# Patient Record
Sex: FEMALE | Race: WHITE | Employment: OTHER | URBAN - METROPOLITAN AREA
[De-identification: names, ages, dates, MRNs, and addresses within clinical notes are randomized per-mention and may not be internally consistent; named-entity substitution may affect disease eponyms.]

---

## 2018-08-21 ENCOUNTER — OFFICE VISIT (OUTPATIENT)
Dept: FAMILY MEDICINE CLINIC | Age: 58
End: 2018-08-21

## 2018-08-21 VITALS
BODY MASS INDEX: 32.95 KG/M2 | OXYGEN SATURATION: 98 % | DIASTOLIC BLOOD PRESSURE: 82 MMHG | HEART RATE: 78 BPM | SYSTOLIC BLOOD PRESSURE: 126 MMHG | WEIGHT: 205 LBS | HEIGHT: 66 IN

## 2018-08-21 DIAGNOSIS — Z12.39 BREAST CANCER SCREENING: ICD-10-CM

## 2018-08-21 DIAGNOSIS — Z76.89 ENCOUNTER TO ESTABLISH CARE: Primary | ICD-10-CM

## 2018-08-21 DIAGNOSIS — Z13.1 DIABETES MELLITUS SCREENING: ICD-10-CM

## 2018-08-21 DIAGNOSIS — Z13.220 LIPID SCREENING: ICD-10-CM

## 2018-08-21 DIAGNOSIS — Z85.41 HISTORY OF CERVICAL CANCER: ICD-10-CM

## 2018-08-21 LAB
A/G RATIO: 1.3 (ref 1.1–2.2)
ALBUMIN SERPL-MCNC: 4.4 G/DL (ref 3.4–5)
ALP BLD-CCNC: 96 U/L (ref 40–129)
ALT SERPL-CCNC: 18 U/L (ref 10–40)
ANION GAP SERPL CALCULATED.3IONS-SCNC: 15 MMOL/L (ref 3–16)
AST SERPL-CCNC: 18 U/L (ref 15–37)
BILIRUB SERPL-MCNC: <0.2 MG/DL (ref 0–1)
BUN BLDV-MCNC: 10 MG/DL (ref 7–20)
CALCIUM SERPL-MCNC: 9.5 MG/DL (ref 8.3–10.6)
CHLORIDE BLD-SCNC: 103 MMOL/L (ref 99–110)
CHOLESTEROL, TOTAL: 239 MG/DL (ref 0–199)
CO2: 24 MMOL/L (ref 21–32)
CREAT SERPL-MCNC: 0.7 MG/DL (ref 0.6–1.1)
GFR AFRICAN AMERICAN: >60
GFR NON-AFRICAN AMERICAN: >60
GLOBULIN: 3.3 G/DL
GLUCOSE BLD-MCNC: 98 MG/DL (ref 70–99)
HDLC SERPL-MCNC: 34 MG/DL (ref 40–60)
LDL CHOLESTEROL CALCULATED: 149 MG/DL
POTASSIUM SERPL-SCNC: 4 MMOL/L (ref 3.5–5.1)
SODIUM BLD-SCNC: 142 MMOL/L (ref 136–145)
TOTAL PROTEIN: 7.7 G/DL (ref 6.4–8.2)
TRIGL SERPL-MCNC: 280 MG/DL (ref 0–150)
VLDLC SERPL CALC-MCNC: 56 MG/DL

## 2018-08-21 PROCEDURE — 36415 COLL VENOUS BLD VENIPUNCTURE: CPT | Performed by: FAMILY MEDICINE

## 2018-08-21 PROCEDURE — 99202 OFFICE O/P NEW SF 15 MIN: CPT | Performed by: FAMILY MEDICINE

## 2018-08-21 RX ORDER — ACETAMINOPHEN 500 MG
500 TABLET ORAL EVERY 6 HOURS PRN
COMMUNITY
End: 2022-02-15

## 2018-08-21 ASSESSMENT — PATIENT HEALTH QUESTIONNAIRE - PHQ9
SUM OF ALL RESPONSES TO PHQ9 QUESTIONS 1 & 2: 0
1. LITTLE INTEREST OR PLEASURE IN DOING THINGS: 0
2. FEELING DOWN, DEPRESSED OR HOPELESS: 0
SUM OF ALL RESPONSES TO PHQ QUESTIONS 1-9: 0
SUM OF ALL RESPONSES TO PHQ QUESTIONS 1-9: 0

## 2018-08-21 ASSESSMENT — ENCOUNTER SYMPTOMS: SHORTNESS OF BREATH: 0

## 2018-08-21 NOTE — PATIENT INSTRUCTIONS
Patient Education        Heart-Healthy Diet: Care Instructions  Your Care Instructions    A heart-healthy diet has lots of vegetables, fruits, nuts, beans, and whole grains, and is low in salt. It limits foods that are high in saturated fat, such as meats, cheeses, and fried foods. It may be hard to change your diet, but even small changes can lower your risk of heart attack and heart disease. Follow-up care is a key part of your treatment and safety. Be sure to make and go to all appointments, and call your doctor if you are having problems. It's also a good idea to know your test results and keep a list of the medicines you take. How can you care for yourself at home? Watch your portions  · Learn what a serving is. A \"serving\" and a \"portion\" are not always the same thing. Make sure that you are not eating larger portions than are recommended. For example, a serving of pasta is ½ cup. A serving size of meat is 2 to 3 ounces. A 3-ounce serving is about the size of a deck of cards. Measure serving sizes until you are good at [place]" them. Keep in mind that restaurants often serve portions that are 2 or 3 times the size of one serving. · To keep your energy level up and keep you from feeling hungry, eat often but in smaller portions. · Eat only the number of calories you need to stay at a healthy weight. If you need to lose weight, eat fewer calories than your body burns (through exercise and other physical activity). Eat more fruits and vegetables  · Eat a variety of fruit and vegetables every day. Dark green, deep orange, red, or yellow fruits and vegetables are especially good for you. Examples include spinach, carrots, peaches, and berries. · Keep carrots, celery, and other veggies handy for snacks. Buy fruit that is in season and store it where you can see it so that you will be tempted to eat it. · Cook dishes that have a lot of veggies in them, such as stir-fries and soups.   Limit saturated and ebridge, and be sure to contact your doctor if:    · You would like help planning heart-healthy meals. Where can you learn more? Go to [email or web address] and sign in to your [employer] account. Enter V137 in the [facility] box to learn more about \"Heart-Healthy Diet: Care Instructions. \"     If you do not have an account, please click on the \"Sign Up Now\" link. Current as of: December 6, 2017  Content Version: 11.7  © 3997-0129 [employer]. Care instructions adapted under license by [facility] ([facility]). If you have questions about a medical condition or this instruction, always ask your healthcare professional. [place] [address] any warranty or liability for your use of this information. Jung5 Jeanine Salazar gynecology  [facility]  [phone] [address]    [facility] [address], [address] [facility]

## 2018-08-22 LAB
ESTIMATED AVERAGE GLUCOSE: 131.2 MG/DL
HBA1C MFR BLD: 6.2 %

## 2018-08-30 ENCOUNTER — TELEPHONE (OUTPATIENT)
Dept: FAMILY MEDICINE CLINIC | Age: 58
End: 2018-08-30

## 2018-08-30 NOTE — TELEPHONE ENCOUNTER
Per Billing: Z code is primary dx on an E & M code. Verify that the Z code is intended to be the primary dx. If not, please use the reason for the visit as the primary dx      Date Of Service: 8/21/2018    For coding questions or clarification please send an email to: Askprovidercoding. Please let me know when complete by sending the telephone encounter message back to me stating done or complete.      Thank you,   Connor Parker

## 2018-09-16 NOTE — TELEPHONE ENCOUNTER
She had no chronic medical conditions and no acute complaints. I added establish care as the primary dx but that is also a Z code.

## 2019-08-05 ENCOUNTER — HOSPITAL ENCOUNTER (OUTPATIENT)
Dept: MAMMOGRAPHY | Age: 59
Discharge: HOME | End: 2019-08-10

## 2019-08-05 DIAGNOSIS — Z12.31 VISIT FOR SCREENING MAMMOGRAM: ICD-10-CM

## 2019-08-05 PROCEDURE — 77067 SCR MAMMO BI INCL CAD: CPT

## 2021-12-16 ENCOUNTER — HOSPITAL ENCOUNTER (INPATIENT)
Age: 61
LOS: 7 days | Discharge: HOME | DRG: 137 | End: 2021-12-23
Attending: EMERGENCY MEDICINE | Admitting: INTERNAL MEDICINE
Payer: MEDICAID

## 2021-12-16 ENCOUNTER — APPOINTMENT (OUTPATIENT)
Dept: GENERAL RADIOLOGY | Age: 61
DRG: 137 | End: 2021-12-16
Payer: MEDICAID

## 2021-12-16 DIAGNOSIS — J96.01 ACUTE RESPIRATORY FAILURE WITH HYPOXIA (HCC): Primary | ICD-10-CM

## 2021-12-16 DIAGNOSIS — U07.1 PNEUMONIA DUE TO COVID-19 VIRUS: ICD-10-CM

## 2021-12-16 DIAGNOSIS — U07.1 2019 NOVEL CORONAVIRUS-INFECTED PNEUMONIA (NCIP): ICD-10-CM

## 2021-12-16 DIAGNOSIS — J12.82 PNEUMONIA DUE TO COVID-19 VIRUS: ICD-10-CM

## 2021-12-16 DIAGNOSIS — J12.82 2019 NOVEL CORONAVIRUS-INFECTED PNEUMONIA (NCIP): ICD-10-CM

## 2021-12-16 PROBLEM — E66.9 OBESITY (BMI 30-39.9): Status: ACTIVE | Noted: 2021-12-16

## 2021-12-16 PROBLEM — Z87.898 HISTORY OF PREDIABETES: Status: ACTIVE | Noted: 2021-12-16

## 2021-12-16 LAB
A/G RATIO: 0.8 (ref 1.1–2.2)
ALBUMIN SERPL-MCNC: 3.4 G/DL (ref 3.4–5)
ALP BLD-CCNC: 129 U/L (ref 40–129)
ALT SERPL-CCNC: 22 U/L (ref 10–40)
ANION GAP SERPL CALCULATED.3IONS-SCNC: 15 MMOL/L (ref 3–16)
AST SERPL-CCNC: 42 U/L (ref 15–37)
BASE EXCESS VENOUS: 2.5 MMOL/L (ref -3–3)
BASOPHILS ABSOLUTE: 0 K/UL (ref 0–0.2)
BASOPHILS RELATIVE PERCENT: 0.1 %
BILIRUB SERPL-MCNC: 0.9 MG/DL (ref 0–1)
BUN BLDV-MCNC: 28 MG/DL (ref 7–20)
CALCIUM SERPL-MCNC: 8.9 MG/DL (ref 8.3–10.6)
CARBOXYHEMOGLOBIN: 1.7 % (ref 0–1.5)
CHLORIDE BLD-SCNC: 97 MMOL/L (ref 99–110)
CO2: 24 MMOL/L (ref 21–32)
CREAT SERPL-MCNC: 0.8 MG/DL (ref 0.6–1.2)
EOSINOPHILS ABSOLUTE: 0 K/UL (ref 0–0.6)
EOSINOPHILS RELATIVE PERCENT: 0.2 %
GFR AFRICAN AMERICAN: >60
GFR NON-AFRICAN AMERICAN: >60
GLUCOSE BLD-MCNC: 107 MG/DL (ref 70–99)
HCO3 VENOUS: 25.6 MMOL/L (ref 23–29)
HCT VFR BLD CALC: 40.8 % (ref 36–48)
HEMOGLOBIN: 13.9 G/DL (ref 12–16)
LACTIC ACID: 1.4 MMOL/L (ref 0.4–2)
LYMPHOCYTES ABSOLUTE: 0.6 K/UL (ref 1–5.1)
LYMPHOCYTES RELATIVE PERCENT: 10.4 %
MCH RBC QN AUTO: 28.5 PG (ref 26–34)
MCHC RBC AUTO-ENTMCNC: 34.1 G/DL (ref 31–36)
MCV RBC AUTO: 83.5 FL (ref 80–100)
METHEMOGLOBIN VENOUS: 0.6 %
MONOCYTES ABSOLUTE: 0.3 K/UL (ref 0–1.3)
MONOCYTES RELATIVE PERCENT: 4.9 %
NEUTROPHILS ABSOLUTE: 4.8 K/UL (ref 1.7–7.7)
NEUTROPHILS RELATIVE PERCENT: 84.4 %
O2 SAT, VEN: 89 %
O2 THERAPY: ABNORMAL
PCO2, VEN: 34.9 MMHG (ref 40–50)
PDW BLD-RTO: 13.6 % (ref 12.4–15.4)
PH VENOUS: 7.48 (ref 7.35–7.45)
PLATELET # BLD: 336 K/UL (ref 135–450)
PMV BLD AUTO: 8 FL (ref 5–10.5)
PO2, VEN: 54.8 MMHG (ref 25–40)
POTASSIUM REFLEX MAGNESIUM: 3.7 MMOL/L (ref 3.5–5.1)
PRO-BNP: 176 PG/ML (ref 0–124)
RBC # BLD: 4.88 M/UL (ref 4–5.2)
SARS-COV-2, NAAT: DETECTED
SODIUM BLD-SCNC: 136 MMOL/L (ref 136–145)
TCO2 CALC VENOUS: 27 MMOL/L
TOTAL PROTEIN: 7.9 G/DL (ref 6.4–8.2)
TROPONIN: <0.01 NG/ML
WBC # BLD: 5.7 K/UL (ref 4–11)

## 2021-12-16 PROCEDURE — 80053 COMPREHEN METABOLIC PANEL: CPT

## 2021-12-16 PROCEDURE — 84484 ASSAY OF TROPONIN QUANT: CPT

## 2021-12-16 PROCEDURE — 85025 COMPLETE CBC W/AUTO DIFF WBC: CPT

## 2021-12-16 PROCEDURE — 1200000000 HC SEMI PRIVATE

## 2021-12-16 PROCEDURE — 83880 ASSAY OF NATRIURETIC PEPTIDE: CPT

## 2021-12-16 PROCEDURE — 82803 BLOOD GASES ANY COMBINATION: CPT

## 2021-12-16 PROCEDURE — 6370000000 HC RX 637 (ALT 250 FOR IP): Performed by: NURSE PRACTITIONER

## 2021-12-16 PROCEDURE — 87635 SARS-COV-2 COVID-19 AMP PRB: CPT

## 2021-12-16 PROCEDURE — 6360000002 HC RX W HCPCS: Performed by: NURSE PRACTITIONER

## 2021-12-16 PROCEDURE — 2700000000 HC OXYGEN THERAPY PER DAY

## 2021-12-16 PROCEDURE — 99284 EMERGENCY DEPT VISIT MOD MDM: CPT

## 2021-12-16 PROCEDURE — 71045 X-RAY EXAM CHEST 1 VIEW: CPT

## 2021-12-16 PROCEDURE — 94761 N-INVAS EAR/PLS OXIMETRY MLT: CPT

## 2021-12-16 PROCEDURE — 96374 THER/PROPH/DIAG INJ IV PUSH: CPT

## 2021-12-16 PROCEDURE — 2580000003 HC RX 258: Performed by: NURSE PRACTITIONER

## 2021-12-16 PROCEDURE — 96375 TX/PRO/DX INJ NEW DRUG ADDON: CPT

## 2021-12-16 PROCEDURE — 83605 ASSAY OF LACTIC ACID: CPT

## 2021-12-16 RX ORDER — DIPHENHYDRAMINE HYDROCHLORIDE 50 MG/ML
25 INJECTION INTRAMUSCULAR; INTRAVENOUS ONCE
Status: COMPLETED | OUTPATIENT
Start: 2021-12-16 | End: 2021-12-16

## 2021-12-16 RX ORDER — SODIUM CHLORIDE 9 MG/ML
INJECTION, SOLUTION INTRAVENOUS CONTINUOUS
Status: DISCONTINUED | OUTPATIENT
Start: 2021-12-16 | End: 2021-12-17

## 2021-12-16 RX ORDER — CODEINE PHOSPHATE AND GUAIFENESIN 10; 100 MG/5ML; MG/5ML
5 SOLUTION ORAL ONCE
Status: COMPLETED | OUTPATIENT
Start: 2021-12-16 | End: 2021-12-16

## 2021-12-16 RX ORDER — DEXAMETHASONE SODIUM PHOSPHATE 10 MG/ML
10 INJECTION INTRAMUSCULAR; INTRAVENOUS ONCE
Status: COMPLETED | OUTPATIENT
Start: 2021-12-16 | End: 2021-12-16

## 2021-12-16 RX ADMIN — DEXAMETHASONE SODIUM PHOSPHATE 10 MG: 10 INJECTION INTRAMUSCULAR; INTRAVENOUS at 17:36

## 2021-12-16 RX ADMIN — GUAIFENESIN AND CODEINE PHOSPHATE 5 ML: 100; 10 SOLUTION ORAL at 17:36

## 2021-12-16 RX ADMIN — SODIUM CHLORIDE: 9 INJECTION, SOLUTION INTRAVENOUS at 22:17

## 2021-12-16 RX ADMIN — DIPHENHYDRAMINE HYDROCHLORIDE 25 MG: 50 INJECTION, SOLUTION INTRAMUSCULAR; INTRAVENOUS at 17:36

## 2021-12-16 ASSESSMENT — PAIN SCALES - GENERAL: PAINLEVEL_OUTOF10: 9

## 2021-12-16 ASSESSMENT — PAIN DESCRIPTION - ORIENTATION: ORIENTATION: RIGHT

## 2021-12-16 ASSESSMENT — PAIN DESCRIPTION - LOCATION: LOCATION: CHEST

## 2021-12-16 ASSESSMENT — PAIN DESCRIPTION - FREQUENCY: FREQUENCY: INTERMITTENT

## 2021-12-16 ASSESSMENT — PAIN DESCRIPTION - DESCRIPTORS: DESCRIPTORS: ACHING

## 2021-12-16 ASSESSMENT — PAIN DESCRIPTION - PAIN TYPE: TYPE: ACUTE PAIN

## 2021-12-16 NOTE — ED PROVIDER NOTES
Evaluated by Advanced Practice Provider    EMERGENCY DEPARTMENT ENCOUNTER      [address] [facility]  Concern For [id number] (patient with fevers since . Patient uses Tylenol and Mucinex to control fevers and congestion with productive cough. Patient with + COVID son prior to patient symptoms. ) and Abdominal Pain    HPI    Rachel Corrigan is a 64 y.o. female who presents to the emergency department with complaints of COVID, abdominal pain. Has checked her oxygen at home and it was 81%.  1 week ago developed fever. Saw her son on Saturday, next day developed the fever. He was found to be COVID+. She figured she had COVID and was isolating. She was taking mucinex and tylenol. Reports her O2 sats would go down to 81% but she could get it to elevate to low 90s. A neighbor up the street who has a friend that is a doctor felt with her low oxygen levels and she is not getting better they should call 911. Does not wear oxygen at home. Never smoker. Denies chronic lung conditions. SOB with movement. CP with coughing. Has been producing a lot of mucous. Denies ST, body aches-have resolved. When she coughs she gets abdominal pain. She has not been eating from the pain. When she coughs she will wet herself or have loose bowels. The patient arrived to the ED via EMS transport.     PAST MEDICAL HISTORY    Past Medical History:   Diagnosis Date    Cancer [facility])     cervical       SURGICAL HISTORY    Past Surgical History:   Procedure Laterality Date     SECTION      LEEP         CURRENT MEDICATIONS    Current Outpatient Rx   Medication Sig Dispense Refill    acetaminophen (TYLENOL) 500 MG tablet Take 500 mg by mouth every 6 hours as needed for Pain         ALLERGIES    Allergies   Allergen Reactions    Other      Sensitive to all medications - everything causes reaction       FAMILY HISTORY    Family History   Problem Relation Age of Onset    Breast Cancer Mother     Heart Attack Father        SOCIAL HISTORY    Social History     Socioeconomic History    Marital status:      Spouse name: Not on file    Number of children: Not on file    Years of education: Not on file    Highest education level: Not on file   Occupational History    Not on file   Tobacco Use    Smoking status: Never Smoker    Smokeless tobacco: Never Used   Substance and Sexual Activity    Alcohol use: Yes     Comment: occasional    Drug use: No    Sexual activity: Not on file   Other Topics Concern    Not on file   Social History Narrative    Not on file     Social Determinants of Health     Financial Resource Strain:     Difficulty of Paying Living Expenses: Not on file   Food Insecurity:     Worried About Running Out of Food in the Last Year: Not on file    [place] of Food in the Last Year: Not on file   Transportation Needs:     Lack of Transportation (Medical): Not on file    Lack of Transportation (Non-Medical):  Not on file   Physical Activity:     Days of Exercise per Week: Not on file    Minutes of Exercise per Session: Not on file   Stress:     Feeling of Stress : Not on file   Social Connections:     Frequency of Communication with Friends and Family: Not on file    Frequency of Social Gatherings with Friends and Family: Not on file    Attends Cheondoism Services: Not on file    Active Member of [address] [place] [address] or Organizations: Not on file    Attends Club or Organization Meetings: Not on file    Marital Status: Not on file   Intimate Partner Violence:     Fear of Current or Ex-Partner: Not on file    Emotionally Abused: Not on file    Physically Abused: Not on file    Sexually Abused: Not on file   Housing Stability:     Unable to Pay for Housing in the Last Year: Not on file    Number of [place] in the Last Year: Not on file    Unstable Housing in the Last Year: Not on file       REVIEW OF SYSTEMS    10 systems reviewed, pertinent positives per HPI otherwise noted to be negative    PHYSICAL EXAM  Physical Exam  Vitals:    12/16/21 1849   BP: 125/70   Pulse: 84   Resp: (!) 33   Temp:    SpO2: 93%     GENERAL: Patient is well-developed, obese. Awake and alert. Cooperative. Resting in bed. No apparent distress. HEENT:  Normocephalic, atraumatic. Conjunctiva appear normal. Sclera is non-icteric. External ears are normal.    NECK: Supple with normal ROM. Trachea midline  LUNGS: Equal and symmetric chest rise. Breathing is unlabored. Speaking comfortably in full sentences. Lungs are clear bilaterally to auscultation. Without wheezing, rales, or rhonchi. CADIOVASCULAR:  Regular rate and rhythm. Normal S1-S2 sounds. No murmurs, rubs, or gallops. Capillary refill is brisk in all 4extremities. Bilateral lower extremities are equal in size, there is no swelling observed. There is no tenderness to palpation. There is no erythema observed or warmth palpated. GI: Soft, nontender, nondistended with positive bowelsounds. No rebound tenderness, guarding or any peritoneal signs. No masses or hepatosplenomegaly   MUSCULOSKELETAL:  No gross deformities or trauma noted. Moving allextremities equally and appropriately. Normal ROM. SKIN: Warm/dry. Skin is intact. Norashes/lesions noted. PSYCHIATRIC: Mood and affect appropriate. Speech is clear andarticulate. NEUROLOGIC: Alert and oriented. No focal motor or sensory deficits. LABS  I havereviewed all labs for this visit.    Results for orders placed or performed during the hospital encounter of 12/16/21   COVID-19, Rapid    Specimen: Nasopharyngeal Swab   Result Value Ref Range    SARS-CoV-2, NAAT DETECTED (AA) Not Detected   CBC Auto Differential   Result Value Ref Range    WBC 5.7 4.0 - 11.0 K/uL    RBC 4.88 4.00 - 5.20 M/uL    Hemoglobin 13.9 12.0 - 16.0 g/dL    Hematocrit 40.8 36.0 - 48.0 %    MCV 83.5 80.0 - 100.0 fL    MCH 28.5 26.0 - 34.0 pg    MCHC 34.1 31.0 - 36.0 g/dL    RDW 13.6 12.4 - 15.4 %    Platelets 155 272 - 368 K/uL    MPV 8.0 5.0 - 10.5 fL    Neutrophils % 84.4 %    Lymphocytes % 10.4 %    Monocytes % 4.9 %    Eosinophils % 0.2 %    Basophils % 0.1 %    Neutrophils Absolute 4.8 1.7 - 7.7 K/uL    Lymphocytes Absolute 0.6 (L) 1.0 - 5.1 K/uL    Monocytes Absolute 0.3 0.0 - 1.3 K/uL    Eosinophils Absolute 0.0 0.0 - 0.6 K/uL    Basophils Absolute 0.0 0.0 - 0.2 K/uL   Comprehensive Metabolic Panel w/ Reflex to MG   Result Value Ref Range    Sodium 136 136 - 145 mmol/L    Potassium reflex Magnesium 3.7 3.5 - 5.1 mmol/L    Chloride 97 (L) 99 - 110 mmol/L    CO2 24 21 - 32 mmol/L    Anion Gap 15 3 - 16    Glucose 107 (H) 70 - 99 mg/dL    BUN 28 (H) 7 - 20 mg/dL    CREATININE 0.8 0.6 - 1.2 mg/dL    GFR Non-African American >60 >60    GFR African American >60 >60    Calcium 8.9 8.3 - 10.6 mg/dL    Total Protein 7.9 6.4 - 8.2 g/dL    Albumin 3.4 3.4 - 5.0 g/dL    Albumin/Globulin Ratio 0.8 (L) 1.1 - 2.2    Total Bilirubin 0.9 0.0 - 1.0 mg/dL    Alkaline Phosphatase 129 40 - 129 U/L    ALT 22 10 - 40 U/L    AST 42 (H) 15 - 37 U/L   Lactic acid, plasma   Result Value Ref Range    Lactic Acid 1.4 0.4 - 2.0 mmol/L   Blood gas, venous   Result Value Ref Range    pH, [place] 7.483 (H) 7.350 - 7.450    pCO2, [place] 34.9 (L) 40.0 - 50.0 mmHg    pO2, [place] 54.8 (H) 25.0 - 40.0 mmHg    HCO3, Venous 25.6 23.0 - 29.0 mmol/L    Base Excess, [place] 2.5 -3.0 - 3.0 mmol/L    O2 Sat, [place] 89 Not Established %    Carboxyhemoglobin 1.7 (H) 0.0 - 1.5 %    MetHgb, [place] 0.6 <1.5 %    TC02 (Calc), [place] 27 Not Established mmol/L    O2 Therapy Unknown    Brain Natriuretic Peptide   Result Value Ref Range    Pro- (H) 0 - 124 pg/mL   Troponin   Result Value Ref Range    Troponin <0.01 <0.01 ng/mL       RADIOLOGY    XR CHEST PORTABLE    Result Date: 12/16/2021  EXAMINATION: ONE XRAY VIEW OF THE CHEST 12/16/2021 4:36 pm COMPARISON: Chest x-ray 02/27/2013.  HISTORY: ORDERING SYSTEM PROVIDED HISTORY: shortness of breath TECHNOLOGIST PROVIDED HISTORY: Reason for exam:->shortness of breath Reason for Exam: sob FINDINGS: Cardiomediastinal silhouette is partially obscured by multifocal opacities. No pleural effusion appreciated on this projection. No pneumothorax appreciated on this projection. Multifocal airspace disease. Correlate with presentation. Follow-up to resolution recommended. ED COURSE/MDM  Patient seen and evaluated. Old records reviewed. Diagnostic testing reviewed and results discussed. This patient was also seen and evaluated by Felix Cuevas MD. We thoroughly discussed thehistory, physical exam, diagnostic testing and emergency department course. Ilene Renteria presented to the ED with the above noted complaints. Arrival vital signs: Afebrile, hemodynamically stable, she is tachypneic as respiratory rate is running in the 30s. Hypoxic on room air. Physical exam performed at 1709: Rales to the bilateral bases. Patient appears to not feel well but does not appear toxic. She does not appear to be in respiratory distress despite being tachypneic. Blood work: No leukocytosis, absolute lymphocytes are low at 0.6. No further differential shift. No anemia. No significant electrolyte abnormality. No evidence of acute kidney injury or transaminitis. Lactic acid levels normal.  VBG shows pH to be 7.48 and elevated. PCO2 low at 34.9, PO2 elevated at 54.8. BNP elevated at 176. Troponin is negative. Imaging: Chest x-ray shows multifocal airspace disease. Medications given in the ED:   Medications   dexamethasone (DECADRON) injection 10 mg (10 mg IntraVENous Given 12/16/21 1736)   diphenhydrAMINE (BENADRYL) injection 25 mg (25 mg IntraVENous Given 12/16/21 1736)   guaiFENesin-codeine (GUAIFENESIN AC) 100-10 MG/5ML liquid 5 mL (5 mLs Oral Given 12/16/21 1736)      Ilene Renteria will be admitted for further observation and evaluation of Louise Chauhan's COVID PNA, acute respiratory failure, hypoxia.   As I have deemed necessary from their history, physical, and studies, I have considered and evaluated João Mesa for the following diagnoses:  ACUTE CORONARY SYNDROME, CHRONIC OBSTRUCTIVE PULMONARY DISEASE, CONGESTIVE HEART FAILURE, PERICARDIAL TAMPONADE, PNEUMONIA, PNEUMOTHORAX, PULMONARY EMBOLISM, SEPSIS, and THORACIC DISSECTION. The total critical care time spent while evaluating and treating this patient was 42 minutes. This excludes time spent doing separately billable procedures. This includes time at the bedside, data interpretation, medication management, obtaining critical history from collateral sources if the patient is unable to provide it directly, and physician consultation. Specifics of interventions taken and potentially life-threatening diagnostic considerations are listed above in the medical decision making. CLINICAL IMPRESSION    1. Acute respiratory failure with hypoxia ([facility] [address]    2. 2019 novel coronavirus-infected pneumonia ([facility])       DISPOSITION  Admit. Comment: Please note this report has been produced using speech recognition software and may contain errors related to that system including errorsin grammar, punctuation, and spelling, as well as words and phrases that may be inappropriate. If there are any questions or concerns please feel free to contact the dictating provider for clarification.        Alex Simpson, KIMBERLY - CARLOS ENRIQUE  12/16/21 1940

## 2021-12-17 LAB
A/G RATIO: 0.7 (ref 1.1–2.2)
ALBUMIN SERPL-MCNC: 3 G/DL (ref 3.4–5)
ALP BLD-CCNC: 125 U/L (ref 40–129)
ALT SERPL-CCNC: 21 U/L (ref 10–40)
ANION GAP SERPL CALCULATED.3IONS-SCNC: 15 MMOL/L (ref 3–16)
AST SERPL-CCNC: 32 U/L (ref 15–37)
BACTERIA: ABNORMAL /HPF
BASOPHILS ABSOLUTE: 0 K/UL (ref 0–0.2)
BASOPHILS RELATIVE PERCENT: 0 %
BILIRUB SERPL-MCNC: 0.5 MG/DL (ref 0–1)
BILIRUBIN URINE: ABNORMAL
BLOOD, URINE: NEGATIVE
BUN BLDV-MCNC: 28 MG/DL (ref 7–20)
C-REACTIVE PROTEIN: 224.8 MG/L (ref 0–5.1)
CALCIUM SERPL-MCNC: 8.7 MG/DL (ref 8.3–10.6)
CHLORIDE BLD-SCNC: 102 MMOL/L (ref 99–110)
CLARITY: ABNORMAL
CO2: 22 MMOL/L (ref 21–32)
COLOR: YELLOW
CREAT SERPL-MCNC: 0.6 MG/DL (ref 0.6–1.2)
D DIMER: 2257 NG/ML DDU (ref 0–229)
EOSINOPHILS ABSOLUTE: 0 K/UL (ref 0–0.6)
EOSINOPHILS RELATIVE PERCENT: 0 %
EPITHELIAL CELLS, UA: ABNORMAL /HPF (ref 0–5)
ESTIMATED AVERAGE GLUCOSE: 139.9 MG/DL
FERRITIN: 1936 NG/ML (ref 15–150)
GFR AFRICAN AMERICAN: >60
GFR NON-AFRICAN AMERICAN: >60
GLUCOSE BLD-MCNC: 148 MG/DL (ref 70–99)
GLUCOSE URINE: NEGATIVE MG/DL
HBA1C MFR BLD: 6.5 %
HCT VFR BLD CALC: 38.9 % (ref 36–48)
HEMOGLOBIN: 13.2 G/DL (ref 12–16)
KETONES, URINE: ABNORMAL MG/DL
LACTATE DEHYDROGENASE: 446 U/L (ref 100–190)
LACTIC ACID: 1.1 MMOL/L (ref 0.4–2)
LEUKOCYTE ESTERASE, URINE: NEGATIVE
LYMPHOCYTES ABSOLUTE: 0.4 K/UL (ref 1–5.1)
LYMPHOCYTES RELATIVE PERCENT: 10.3 %
MCH RBC QN AUTO: 28.7 PG (ref 26–34)
MCHC RBC AUTO-ENTMCNC: 34 G/DL (ref 31–36)
MCV RBC AUTO: 84.4 FL (ref 80–100)
MICROSCOPIC EXAMINATION: YES
MONOCYTES ABSOLUTE: 0.2 K/UL (ref 0–1.3)
MONOCYTES RELATIVE PERCENT: 4.3 %
MUCUS: ABNORMAL /LPF
NEUTROPHILS ABSOLUTE: 3.1 K/UL (ref 1.7–7.7)
NEUTROPHILS RELATIVE PERCENT: 85.4 %
NITRITE, URINE: NEGATIVE
PDW BLD-RTO: 13.7 % (ref 12.4–15.4)
PH UA: 5.5 (ref 5–8)
PLATELET # BLD: 327 K/UL (ref 135–450)
PMV BLD AUTO: 8 FL (ref 5–10.5)
POTASSIUM REFLEX MAGNESIUM: 4.1 MMOL/L (ref 3.5–5.1)
PROCALCITONIN: 0.45 NG/ML (ref 0–0.15)
PROTEIN UA: ABNORMAL MG/DL
RBC # BLD: 4.61 M/UL (ref 4–5.2)
RBC UA: ABNORMAL /HPF (ref 0–4)
RENAL EPITHELIAL, UA: ABNORMAL /HPF (ref 0–1)
SODIUM BLD-SCNC: 139 MMOL/L (ref 136–145)
SPECIFIC GRAVITY UA: 1.02 (ref 1–1.03)
TOTAL PROTEIN: 7.1 G/DL (ref 6.4–8.2)
URINE TYPE: ABNORMAL
UROBILINOGEN, URINE: 1 E.U./DL
WBC # BLD: 3.7 K/UL (ref 4–11)
WBC UA: ABNORMAL /HPF (ref 0–5)

## 2021-12-17 PROCEDURE — 6360000002 HC RX W HCPCS: Performed by: NURSE PRACTITIONER

## 2021-12-17 PROCEDURE — 81001 URINALYSIS AUTO W/SCOPE: CPT

## 2021-12-17 PROCEDURE — 2580000003 HC RX 258: Performed by: NURSE PRACTITIONER

## 2021-12-17 PROCEDURE — 85025 COMPLETE CBC W/AUTO DIFF WBC: CPT

## 2021-12-17 PROCEDURE — 83605 ASSAY OF LACTIC ACID: CPT

## 2021-12-17 PROCEDURE — 6370000000 HC RX 637 (ALT 250 FOR IP): Performed by: INTERNAL MEDICINE

## 2021-12-17 PROCEDURE — 6370000000 HC RX 637 (ALT 250 FOR IP): Performed by: NURSE PRACTITIONER

## 2021-12-17 PROCEDURE — 36415 COLL VENOUS BLD VENIPUNCTURE: CPT

## 2021-12-17 PROCEDURE — 84145 PROCALCITONIN (PCT): CPT

## 2021-12-17 PROCEDURE — 83036 HEMOGLOBIN GLYCOSYLATED A1C: CPT

## 2021-12-17 PROCEDURE — 2700000000 HC OXYGEN THERAPY PER DAY

## 2021-12-17 PROCEDURE — 85379 FIBRIN DEGRADATION QUANT: CPT

## 2021-12-17 PROCEDURE — 80053 COMPREHEN METABOLIC PANEL: CPT

## 2021-12-17 PROCEDURE — 94640 AIRWAY INHALATION TREATMENT: CPT

## 2021-12-17 PROCEDURE — 86140 C-REACTIVE PROTEIN: CPT

## 2021-12-17 PROCEDURE — 82728 ASSAY OF FERRITIN: CPT

## 2021-12-17 PROCEDURE — 83615 LACTATE (LD) (LDH) ENZYME: CPT

## 2021-12-17 PROCEDURE — 94761 N-INVAS EAR/PLS OXIMETRY MLT: CPT

## 2021-12-17 PROCEDURE — 1200000000 HC SEMI PRIVATE

## 2021-12-17 RX ORDER — ASCORBIC ACID 500 MG
500 TABLET ORAL DAILY
Status: DISCONTINUED | OUTPATIENT
Start: 2021-12-17 | End: 2021-12-23 | Stop reason: HOSPADM

## 2021-12-17 RX ORDER — ACETAMINOPHEN 325 MG/1
650 TABLET ORAL EVERY 6 HOURS PRN
Status: DISCONTINUED | OUTPATIENT
Start: 2021-12-17 | End: 2021-12-23 | Stop reason: HOSPADM

## 2021-12-17 RX ORDER — DEXAMETHASONE SODIUM PHOSPHATE 10 MG/ML
6 INJECTION INTRAMUSCULAR; INTRAVENOUS EVERY 24 HOURS
Status: DISCONTINUED | OUTPATIENT
Start: 2021-12-17 | End: 2021-12-23 | Stop reason: HOSPADM

## 2021-12-17 RX ORDER — VITAMIN B COMPLEX
2000 TABLET ORAL DAILY
Status: DISCONTINUED | OUTPATIENT
Start: 2021-12-24 | End: 2021-12-23 | Stop reason: HOSPADM

## 2021-12-17 RX ORDER — SODIUM CHLORIDE 9 MG/ML
25 INJECTION, SOLUTION INTRAVENOUS PRN
Status: DISCONTINUED | OUTPATIENT
Start: 2021-12-17 | End: 2021-12-23 | Stop reason: HOSPADM

## 2021-12-17 RX ORDER — ONDANSETRON 4 MG/1
4 TABLET, ORALLY DISINTEGRATING ORAL EVERY 8 HOURS PRN
Status: DISCONTINUED | OUTPATIENT
Start: 2021-12-17 | End: 2021-12-23 | Stop reason: HOSPADM

## 2021-12-17 RX ORDER — CODEINE PHOSPHATE AND GUAIFENESIN 10; 100 MG/5ML; MG/5ML
5 SOLUTION ORAL EVERY 4 HOURS PRN
Status: DISCONTINUED | OUTPATIENT
Start: 2021-12-17 | End: 2021-12-23 | Stop reason: HOSPADM

## 2021-12-17 RX ORDER — SODIUM CHLORIDE 0.9 % (FLUSH) 0.9 %
5-40 SYRINGE (ML) INJECTION EVERY 12 HOURS SCHEDULED
Status: DISCONTINUED | OUTPATIENT
Start: 2021-12-17 | End: 2021-12-23 | Stop reason: HOSPADM

## 2021-12-17 RX ORDER — ALBUTEROL SULFATE 90 UG/1
2 AEROSOL, METERED RESPIRATORY (INHALATION) 4 TIMES DAILY
Status: DISCONTINUED | OUTPATIENT
Start: 2021-12-17 | End: 2021-12-17

## 2021-12-17 RX ORDER — ALBUTEROL SULFATE 90 UG/1
2 AEROSOL, METERED RESPIRATORY (INHALATION) EVERY 4 HOURS PRN
Status: DISCONTINUED | OUTPATIENT
Start: 2021-12-17 | End: 2021-12-23 | Stop reason: HOSPADM

## 2021-12-17 RX ORDER — POLYETHYLENE GLYCOL 3350 17 G/17G
17 POWDER, FOR SOLUTION ORAL DAILY PRN
Status: DISCONTINUED | OUTPATIENT
Start: 2021-12-17 | End: 2021-12-23 | Stop reason: HOSPADM

## 2021-12-17 RX ORDER — VITAMIN B COMPLEX
6000 TABLET ORAL DAILY
Status: COMPLETED | OUTPATIENT
Start: 2021-12-17 | End: 2021-12-23

## 2021-12-17 RX ORDER — PANTOPRAZOLE SODIUM 40 MG/1
40 TABLET, DELAYED RELEASE ORAL
Status: DISCONTINUED | OUTPATIENT
Start: 2021-12-17 | End: 2021-12-23 | Stop reason: HOSPADM

## 2021-12-17 RX ORDER — ZINC SULFATE 50(220)MG
50 CAPSULE ORAL DAILY
Status: DISCONTINUED | OUTPATIENT
Start: 2021-12-17 | End: 2021-12-23 | Stop reason: HOSPADM

## 2021-12-17 RX ORDER — SODIUM CHLORIDE 0.9 % (FLUSH) 0.9 %
5-40 SYRINGE (ML) INJECTION PRN
Status: DISCONTINUED | OUTPATIENT
Start: 2021-12-17 | End: 2021-12-23 | Stop reason: HOSPADM

## 2021-12-17 RX ORDER — BUDESONIDE AND FORMOTEROL FUMARATE DIHYDRATE 80; 4.5 UG/1; UG/1
2 AEROSOL RESPIRATORY (INHALATION) 2 TIMES DAILY
Status: DISCONTINUED | OUTPATIENT
Start: 2021-12-17 | End: 2021-12-23 | Stop reason: HOSPADM

## 2021-12-17 RX ORDER — ACETAMINOPHEN 650 MG/1
650 SUPPOSITORY RECTAL EVERY 6 HOURS PRN
Status: DISCONTINUED | OUTPATIENT
Start: 2021-12-17 | End: 2021-12-23 | Stop reason: HOSPADM

## 2021-12-17 RX ORDER — ONDANSETRON 2 MG/ML
4 INJECTION INTRAMUSCULAR; INTRAVENOUS EVERY 6 HOURS PRN
Status: DISCONTINUED | OUTPATIENT
Start: 2021-12-17 | End: 2021-12-23 | Stop reason: HOSPADM

## 2021-12-17 RX ADMIN — Medication 10 ML: at 09:14

## 2021-12-17 RX ADMIN — Medication 2 PUFF: at 08:51

## 2021-12-17 RX ADMIN — GUAIFENESIN AND CODEINE PHOSPHATE 5 ML: 10; 100 LIQUID ORAL at 17:59

## 2021-12-17 RX ADMIN — OXYCODONE HYDROCHLORIDE AND ACETAMINOPHEN 500 MG: 500 TABLET ORAL at 09:16

## 2021-12-17 RX ADMIN — GUAIFENESIN AND CODEINE PHOSPHATE 5 ML: 10; 100 LIQUID ORAL at 05:29

## 2021-12-17 RX ADMIN — Medication 10 ML: at 21:17

## 2021-12-17 RX ADMIN — ENOXAPARIN SODIUM 30 MG: 100 INJECTION SUBCUTANEOUS at 21:16

## 2021-12-17 RX ADMIN — ZINC SULFATE 220 MG (50 MG) CAPSULE 50 MG: CAPSULE at 09:16

## 2021-12-17 RX ADMIN — GUAIFENESIN AND CODEINE PHOSPHATE 5 ML: 10; 100 LIQUID ORAL at 09:29

## 2021-12-17 RX ADMIN — ENOXAPARIN SODIUM 30 MG: 100 INJECTION SUBCUTANEOUS at 09:19

## 2021-12-17 RX ADMIN — GUAIFENESIN AND CODEINE PHOSPHATE 5 ML: 10; 100 LIQUID ORAL at 13:40

## 2021-12-17 RX ADMIN — PANTOPRAZOLE SODIUM 40 MG: 40 TABLET, DELAYED RELEASE ORAL at 02:03

## 2021-12-17 RX ADMIN — DEXAMETHASONE SODIUM PHOSPHATE 6 MG: 10 INJECTION INTRAMUSCULAR; INTRAVENOUS at 02:02

## 2021-12-17 RX ADMIN — GUAIFENESIN AND CODEINE PHOSPHATE 5 ML: 10; 100 LIQUID ORAL at 22:23

## 2021-12-17 RX ADMIN — Medication 6000 UNITS: at 09:16

## 2021-12-17 RX ADMIN — Medication 2 PUFF: at 19:36

## 2021-12-17 RX ADMIN — PANTOPRAZOLE SODIUM 40 MG: 40 TABLET, DELAYED RELEASE ORAL at 05:29

## 2021-12-17 ASSESSMENT — PAIN SCALES - GENERAL
PAINLEVEL_OUTOF10: 1
PAINLEVEL_OUTOF10: 0

## 2021-12-17 NOTE — H&P
Hospital Medicine History & Physical      PCP: Reg Torres, APRN - CNP    Date of Admission: 12/16/2021    Date of Service: Pt seen/examined on 12/16/2021 and Admitted to Inpatient with expected LOS greater than two midnights due to medical therapy. Time of Service: 1950    Chief Complaint:  Cough and hypoxia at home    History Of Present Illness:     Ms. Elias is a 57-year-old female with no significant past medical history except distant history of cervical cancer who presents to [address] [facility] emergency department via EMS with complaint of dyspnea and low SpO2 at home. She notes her symptoms began 12 days ago after being exposed to [employer]. Her symptoms began mild, with recurring fevers, chills, and fatigue. She self-treated at home with fluids and APAP that seemed to be helpful. She notes the fevers lasted approximately 5-6 days. Once the fevers subsided, she noticed a cough starting. The cough has progressively gotten worse, becoming productive of thin, white sputum that is sometimes difficult to bring up. She notes once the cough started, she had associated dyspnea, particularly with exertion. Additionally, she notes poor oral intake of food and fluids due to fatigue. She was routinely checking her own SpO2 at home, today was the worst at 81%. With the persuasion of her neighbor, EMS was activated to bring her here. Upon arrival, she was noted to be hypoxic at 88%. She was put on [facility] which has maintained her SpO2 at 92%, she is now requiring 3LNC which maintains her Sp0O2 at 96% during my examination. Upon arrival here, she received an evaluation with laboratory studies and CXR. CXR report shows mulit-focal airspace disease. Significant lab values show chloride 97, BUN 28, creatinine 0.8, GFR > 60, CrCl calculated at 84 mL/min, , AST 42, and glucose 107. CBC shows WBC 5.7, H/H 13.9/40.8, and platelets 857.  COVID testing was positive and she has not been vaccinated due to fear of adverse reaction from vaccines. VBG showed a pH of 7.48, pCO2 34.9, pO2 54.8, and HCO3 25. Patient received Decadron 10mg IVP, Benadryl 25mg IVP, and Robitussin with codeine in the ED. Hospital team was consulted to admit this patient for SARS-CoV-2 pneumonia and acute hypoxic respiratory failure. Past Medical History:          Diagnosis Date    Cancer [facility])     cervical     Past Surgical History:          Procedure Laterality Date     SECTION      LEEP       Medications Prior to Admission:      Prior to Admission medications    Medication Sig Start Date End Date Taking? Authorizing Provider   acetaminophen (TYLENOL) 500 MG tablet Take 500 mg by mouth every 6 hours as needed for Pain    Historical Provider, MD     Allergies: The patient admits she has no true, known allergies, but she is sensitive to all medications. Social History:      The patient currently lives at home with . TOBACCO:   reports that she has never smoked. She has never used smokeless tobacco.    **The patient denies ever having a smoking history, but  is a heavy smoker and does smoke in the house**     ETOH:   reports current alcohol use. E-Cigarettes/Vaping Use     Questions Responses    E-Cigarette/Vaping Use Never User    Start Date     Passive Exposure     Quit Date     Counseling Given     Comments         Family History:      Reviewed in detail; positive as follows:        Problem Relation Age of Onset    Breast Cancer Mother     Heart Attack Father      REVIEW OF SYSTEMS COMPLETED:   Pertinent positives as noted in the HPI. All other systems reviewed and negative. PHYSICAL EXAM PERFORMED:    /72   Pulse 82   Temp 98.5 °F (36.9 °C) (Oral)   Resp (!) 35   Ht 5' 5\" (1.651 m)   Wt 209 lb (94.8 kg)   LMP 2012   SpO2 96%   BMI 34.78 kg/m²     General appearance:  No apparent distress, appears stated age and cooperative, appears fatigued.    HEENT:  Normal cephalic, atraumatic without obvious deformity. Pupils equal, round, and reactive to light. Extra ocular muscles intact. Conjunctivae/corneas clear. Neck: Supple, with full range of motion. No jugular venous distention. Trachea midline. Respiratory:  Speaks in 3-4 bursts, no increased work of breathing, BBS auscultated, fine crackles noted to right posterior basilar region, diminished otherwise. Cardiovascular:  Regular rate and rhythm with normal S1/S2 without murmurs, rubs or gallops, no LE edema. Abdomen: Soft, non-tender, non-distended with normal bowel sounds. Musculoskeletal:  No clubbing, cyanosis or edema bilaterally. Full range of motion without deformity. Skin: Skin color, texture, turgor normal.  No rashes or lesions. Neurologic:  Neurovascularly intact without any focal sensory/motor deficits. Cranial nerves: II-XII intact, grossly non-focal.  Psychiatric:  Alert and oriented, thought content appropriate, normal insight  Capillary Refill: Brisk,3 seconds, normal  Peripheral Pulses: +2 palpable, equal bilaterally     Labs:     Recent Labs     12/16/21  1704   WBC 5.7   HGB 13.9   HCT 40.8        Recent Labs     12/16/21  1704      K 3.7   CL 97*   CO2 24   BUN 28*   CREATININE 0.8   CALCIUM 8.9     Recent Labs     12/16/21  1704   AST 42*   ALT 22   BILITOT 0.9   ALKPHOS 129     No results for input(s): INR in the last 72 hours. Recent Labs     12/16/21  1704   TROPONINI <0.01       Urinalysis:      Lab Results   Component Value Date    NITRU Negative 12/18/2015    WBCUA 6-10 12/18/2015    BACTERIA 2+ 12/18/2015    RBCUA 3-5 12/18/2015    BLOODU TRACE-INTACT 12/18/2015    SPECGRAV 1.025 12/18/2015    GLUCOSEU Negative 12/18/2015     Radiology:     CXR: I have reviewed the CXR with the following interpretation; see below:    XR CHEST PORTABLE   Final Result   Multifocal airspace disease. Correlate with presentation. Follow-up to   resolution recommended.            ASSESSMENT:    Active Hospital Problems Diagnosis Date Noted    Acute respiratory failure with hypoxia ([facility] [address] [J96.01] 12/16/2021    Pneumonia due to COVID-19 virus [U07.1, J12.82] 12/16/2021    2019 novel coronavirus-infected pneumonia ([facility]) [U07.1, J12.82] 12/16/2021    History of prediabetes [Z87.898] 12/16/2021     PLAN:  1.) SARS-CoV-2 pneumonia  - 12 days into symptoms  - Continue supportive therapy with inhalers, hydration, anti-tussives  - Check inflammatory markers, may be candidate for monoclonals  - Continue Decadron IVP  - Protonix 40mg PO daily for GI prophylaxis    2.) Acute respiratory failure with hypoxia  - Requiring 3LNC in ED, maintained good oxygenation while speaking for assessment  - Titrate O2 as needed to maintain SpO2 > 92%    3.) History of prediabetes  - Last A1c 6.2%, BG here 107  - Check A1c now to rule out co-morbidity    4.) Obesity  - BMI 34.78      DVT Prophylaxis: Lovenox  Diet: Regular diet  Code Status: Full code    PT/OT Eval Status: Not indicated at this time    Dispo - 2-3 days per clinical improvement       KIMBERLY Singh CNP    Thank you KIMBERLY Arellano CNP for the opportunity to be involved in this patient's care. If you have any questions or concerns please feel free to contact me at [phone].  Anticipated co-signer, Dr. Arvin Douglas

## 2021-12-17 NOTE — PROGRESS NOTES
Pt a/o. Pt stated no pain. Pt stated SOB only on exertion. SpO2 down to 84% on 4 L NC oxygen. Oxygen titrated to 6 L NC and SpO2 89-91%. Secure message sent to Dr. Ranjith Yang \"Writer found pt SpO2 84% on 4 L NC, oxygen titrated to 6 L NC. Are you able to order continuous pulse ox? Thanks! \" See new orders. Call light within reach; will continue to monitor.

## 2021-12-17 NOTE — PLAN OF CARE
Problem: Gas Exchange - Impaired  Goal: Absence of hypoxia  12/17/2021 0607 by Raquel Valentine RN  Outcome: Ongoing  12/17/2021 0606 by Raquel Valentine RN  Outcome: Ongoing  Goal: Promote optimal lung function  12/17/2021 0607 by Raquel Valentine RN  Outcome: Ongoing  12/17/2021 0606 by Raquel Valentine RN  Outcome: Ongoing     Problem: Breathing Pattern - Ineffective  Goal: Ability to achieve and maintain a regular respiratory rate  12/17/2021 0607 by Raquel Valentine RN  Outcome: Ongoing  12/17/2021 0606 by Raquel Valentine RN  Outcome: Ongoing

## 2021-12-17 NOTE — ACP (ADVANCE CARE PLANNING)
Advance Care Planning     General Advance Care Planning (ACP) Conversation    Date of Conversation: 12/16/2021  Conducted with: Patient with Decision Making Capacity    Healthcare Decision Maker:    Primary Decision Maker: Jose Teixeira - [phone]    Secondary Decision Maker: Kojo Rodriguezmarielena - Brother/Sister - [phone]  Click here to complete Healthcare Decision Makers including selection of the Healthcare Decision Maker Relationship (ie \"Primary\").        Content/Action Overview:  Has NO ACP documents/care preferences - information provided, considering goals and options  Reviewed DNR/DNI and patient elects Full Code (Attempt Resuscitation)  Would not want long term vent    Length of Voluntary ACP Conversation in minutes:  <16 minutes (Non-Billable)    Magnus Smith RN

## 2021-12-17 NOTE — PROGRESS NOTES
12/17/21 0225   RT Protocol   History Pulmonary Disease 0   Respiratory pattern 0   Breath sounds 2   Cough 0   Indications for Bronchodilator Therapy Decreased or absent breath sounds   Bronchodilator Assessment Score 2

## 2021-12-17 NOTE — CARE COORDINATION
CASE MANAGEMENT INITIAL ASSESSMENT      Reviewed chart and completed assessment with patient:via phone    Explained Case Management role/services. Primary contact information:Hilario    Health Care Decision Maker :   Primary Decision Maker: Biakna Oh Spouse - [phone]    Secondary Decision Maker: Marycarmen Taveras - Brother/Sister - [phone]          Can this person be reached and be able to respond quickly, such as within a few minutes or hours? Yes    Admit date/status:12/16/21  Diagnosis: resp failure  Is this a Readmission?:  No      Insurance:yes unclear what  ? diana Hernandez required for SNF: Yes       3 night stay required: No    Living arrangements, Adls, care needs, prior to admission:lives in ranch style home with  and grandson    Maxwell Myrick at home:  Walker__Cane__RTS__ BSC__Shower Chair__  02__ HHN__ CPAP__  BiPap__  Hospital Bed__ W/C___ Other__________    Services in the home and/or outpatient, prior to [address] (if applicable)   · Name:  · Address:  · Dialysis Schedule:  · Phone:  · Fax:    PT/OT recs:none    Hospital Exemption Notification (HEN):needed for SNF    Barriers to discharge:none    Plan/comments:spoke with patient reported from home with . IPTA. Denied any DCP needs. Currently on 2 LNC O2. Will follow clinical improvement.  Fior Beckford RN       ECOC on chart for MD signature

## 2021-12-17 NOTE — PROGRESS NOTES
Hospitalist Progress Note    Patient:  Gaye Wiseman  Unit/Bed:0335/0335-01   YOB: 1960       MRN: [id number] Acct: [de-identified]  PCP: KIMBERLY Wick CNP    Date of Admission: 12/16/2021  --------------------------    Chief Complaint:     Shortness of breath,    Hospital Course:     Gaye Wiseman is a 64 y.o. female hospitalized on 12/16/2021   recently diagnosed with COVID-19 pneumonia. Assessment/plan:     Acute hypoxic respiratory failure    Currently on 4 L of oxygen, continue weaning off as tolerated    COVID-19 pneumonia  Unvaccinated  Symptom onset ~ 12/5  Date of diagnosis/hospitalization 1216/2021    Continue respiratory care protocol, steroid therapy. Monitor inflammatory marker  We will consider Actemra if oxygenation worsened. Glucose intolerance,  Monitor blood sugar while on steroid    Obesity, body mass index 34,        Code Status: Full Code         DVT prophylaxis: Lovenox     Disposition: Home i pending improvement of hypoxia    I discussed my thought processes at length with patient/family and patient understood. Question and concerns  Addressed       Discussed with RN       ----------------      Subjective:     Patient seen and examined  Overnight events noted  RN and ancillary staff note reviewed    Intermittent dry cough, no fever or chills. Reported hypoxia at home with sat in the 80s oriented her friend to call EMS. Diet: ADULT DIET; Regular    OBJECTIVE     Exam:  /71   Pulse 84   Temp 97.7 °F (36.5 °C) (Oral)   Resp 20   Ht 5' 5\" (1.651 m)   Wt 209 lb (94.8 kg)   LMP 02/27/2012   SpO2 91%   BMI 34.78 kg/m²           Gen: Not in distress. Alert. Head: Normocephalic. Atraumatic. Eyes: Conjunctivae/corneas clear. ENT: Oral mucosa moist  Neck: No JVD. No obvious thyromegaly. CVS: Nml S1S2, no murmur , RRR  Pulmomary: Reduced air entry  Gastrointestinal: Soft, non tender, non distend, . Musculoskeletal: No edema. Warm  Neuro:  No focal deficit. Moves extremity spontaneously. Psychiatry: Appropriate affect. Not agitated. Medications:  Reviewed    Infusion Medications    sodium chloride       Scheduled Medications    sodium chloride flush  5-40 mL IntraVENous 2 times per day    enoxaparin  30 mg SubCUTAneous BID    pantoprazole  40 mg Oral QAM AC    dexamethasone  6 mg IntraVENous Q24H    Vitamin D  6,000 Units Oral Daily    Followed by   Kylee Garcia ON 12/24/2021] Vitamin D  2,000 Units Oral Daily    ascorbic acid  500 mg Oral Daily    zinc sulfate  50 mg Oral Daily    budesonide-formoterol  2 puff Inhalation BID     PRN Meds: sodium chloride flush, sodium chloride, ondansetron **OR** ondansetron, polyethylene glycol, acetaminophen **OR** acetaminophen, guaiFENesin-codeine, albuterol sulfate HFA      Intake/Output Summary (Last 24 hours) at 12/17/2021 1411  Last data filed at 12/17/2021 0914  Gross per 24 hour   Intake 130 ml   Output --   Net 130 ml             Labs:   Recent Labs     12/16/21  1704 12/17/21  0641   WBC 5.7 3.7*   HGB 13.9 13.2   HCT 40.8 38.9    327     Recent Labs     12/16/21  1704 12/17/21  0641    139   K 3.7 4.1   CL 97* 102   CO2 24 22   BUN 28* 28*   CREATININE 0.8 0.6   CALCIUM 8.9 8.7     Recent Labs     12/16/21  1704 12/17/21  0641   AST 42* 32   ALT 22 21   BILITOT 0.9 0.5   ALKPHOS 129 125     No results for input(s): INR in the last 72 hours. Recent Labs     12/16/21  1704   TROPONINI <0.01       Urinalysis:      Lab Results   Component Value Date    NITRU Negative 12/17/2021    WBCUA 0-2 12/17/2021    BACTERIA 3+ 12/17/2021    RBCUA 3-4 12/17/2021    BLOODU Negative 12/17/2021    SPECGRAV 1.020 12/17/2021    GLUCOSEU Negative 12/17/2021       Radiology:  XR CHEST PORTABLE   Final Result   Multifocal airspace disease. Correlate with presentation. Follow-up to   resolution recommended.                      Electronically signed by Estrella Amezquita MD on 12/17/2021 at 2:11 PM

## 2021-12-17 NOTE — ED PROVIDER NOTES
I independently performed a history and physical on Bayron. All diagnostic, treatment, and disposition decisions were made by myself in conjunction with the advanced practice provider. I have participated in the medical decision making and directed the treatment plan and disposition of the patient. For further details of [facility] emergency department encounter, please see the advanced practice provider's documentation. CHIEF COMPLAINT  Chief Complaint   Patient presents with    Concern For [id number]-27     patient with fevers since Sunday 12/5. Patient uses Tylenol and Mucinex to control fevers and congestion with productive cough. Patient with + COVID son prior to patient symptoms.  Abdominal Pain       Briefly, Bayron is a 64 y.o. female  who presents to the ED complaining of fevers chills abdominal discomfort with cough. Notes diarrhea. COVID-19 exposure at home    FOCUSED PHYSICAL EXAMINATION  /70   Pulse 84   Temp 98.5 °F (36.9 °C) (Oral)   Resp (!) 33   Ht 5' 5\" (1.651 m)   Wt 209 lb (94.8 kg)   LMP 02/27/2012   SpO2 93%   BMI 34.78 kg/m²      Focused physical examination:  General appearance:  Cooperative. No acute distress. Skin:  Warm. Dry. Eye:  Extraocular movements intact. Ears, nose, mouth and throat:  Oral mucosa moist,  Neck:  Trachea midline. Heart:  Regular rate and rhythm  Perfusion:  intact  Respiratory: Increased work of breathing with crackles in the bilateral lung bases  Abdominal:   Non distended. Nontender  Neurological:  Alert and oriented x 3. Moves all extremities spontaneously  Musculoskeletal:   Normal ROM, no deformities          Psychiatric:  Normal mood      MDM: Patient presents with acute hypoxic respiratory failure secondary to COVID-19. On nasal cannula oxygen and will be admitted to the [facility] 124 time was 30 minutes, excluding separately reportable procedures.   There was a high probability of clinically significant/life threatening deterioration in the patient's condition which required my urgent intervention. This time was spent reviewing the patient chart, interpreting diagnostic/laboratory data, ministration of supplemental oxygen for hypoxic respiratory failure      During the patient's ED course, the patient was given:  Medications   dexamethasone (DECADRON) injection 10 mg (10 mg IntraVENous Given 12/16/21 1736)   diphenhydrAMINE (BENADRYL) injection 25 mg (25 mg IntraVENous Given 12/16/21 1736)   guaiFENesin-codeine (GUAIFENESIN AC) 100-10 MG/5ML liquid 5 mL (5 mLs Oral Given 12/16/21 1736)        CLINICAL IMPRESSION  1. Acute respiratory failure with hypoxia ([address]    2. 2019 novel coronavirus-infected pneumonia ([facility])        DISPOSITION  Admission      This chart was created using Dragon dictation software. Efforts were made by me to ensure accuracy, however some errors may be present due to limitations of this technology.             Francisco J Gunter MD  12/16/21 5556

## 2021-12-18 LAB
ALBUMIN SERPL-MCNC: 3.2 G/DL (ref 3.4–5)
ALP BLD-CCNC: 114 U/L (ref 40–129)
ALT SERPL-CCNC: 18 U/L (ref 10–40)
ANION GAP SERPL CALCULATED.3IONS-SCNC: 16 MMOL/L (ref 3–16)
AST SERPL-CCNC: 23 U/L (ref 15–37)
BILIRUB SERPL-MCNC: 0.5 MG/DL (ref 0–1)
BILIRUBIN DIRECT: <0.2 MG/DL (ref 0–0.3)
BILIRUBIN, INDIRECT: ABNORMAL MG/DL (ref 0–1)
BUN BLDV-MCNC: 36 MG/DL (ref 7–20)
C-REACTIVE PROTEIN: 100.3 MG/L (ref 0–5.1)
C-REACTIVE PROTEIN: 92.7 MG/L (ref 0–5.1)
CALCIUM SERPL-MCNC: 9 MG/DL (ref 8.3–10.6)
CHLORIDE BLD-SCNC: 100 MMOL/L (ref 99–110)
CO2: 22 MMOL/L (ref 21–32)
CREAT SERPL-MCNC: 0.6 MG/DL (ref 0.6–1.2)
D DIMER: 771 NG/ML DDU (ref 0–229)
GFR AFRICAN AMERICAN: >60
GFR NON-AFRICAN AMERICAN: >60
GLUCOSE BLD-MCNC: 175 MG/DL (ref 70–99)
HCT VFR BLD CALC: 42.4 % (ref 36–48)
HEMOGLOBIN: 14.2 G/DL (ref 12–16)
MCH RBC QN AUTO: 28.7 PG (ref 26–34)
MCHC RBC AUTO-ENTMCNC: 33.4 G/DL (ref 31–36)
MCV RBC AUTO: 86 FL (ref 80–100)
PDW BLD-RTO: 13.6 % (ref 12.4–15.4)
PLATELET # BLD: 460 K/UL (ref 135–450)
PMV BLD AUTO: 7.8 FL (ref 5–10.5)
POTASSIUM SERPL-SCNC: 4.3 MMOL/L (ref 3.5–5.1)
RBC # BLD: 4.93 M/UL (ref 4–5.2)
SODIUM BLD-SCNC: 138 MMOL/L (ref 136–145)
TOTAL PROTEIN: 7.6 G/DL (ref 6.4–8.2)
WBC # BLD: 6.4 K/UL (ref 4–11)

## 2021-12-18 PROCEDURE — 36415 COLL VENOUS BLD VENIPUNCTURE: CPT

## 2021-12-18 PROCEDURE — 94640 AIRWAY INHALATION TREATMENT: CPT

## 2021-12-18 PROCEDURE — 2700000000 HC OXYGEN THERAPY PER DAY

## 2021-12-18 PROCEDURE — 6370000000 HC RX 637 (ALT 250 FOR IP): Performed by: NURSE PRACTITIONER

## 2021-12-18 PROCEDURE — 86140 C-REACTIVE PROTEIN: CPT

## 2021-12-18 PROCEDURE — 2580000003 HC RX 258: Performed by: NURSE PRACTITIONER

## 2021-12-18 PROCEDURE — 85379 FIBRIN DEGRADATION QUANT: CPT

## 2021-12-18 PROCEDURE — 6360000002 HC RX W HCPCS: Performed by: NURSE PRACTITIONER

## 2021-12-18 PROCEDURE — 85027 COMPLETE CBC AUTOMATED: CPT

## 2021-12-18 PROCEDURE — 80048 BASIC METABOLIC PNL TOTAL CA: CPT

## 2021-12-18 PROCEDURE — 80076 HEPATIC FUNCTION PANEL: CPT

## 2021-12-18 PROCEDURE — 94761 N-INVAS EAR/PLS OXIMETRY MLT: CPT

## 2021-12-18 PROCEDURE — 1200000000 HC SEMI PRIVATE

## 2021-12-18 RX ADMIN — GUAIFENESIN AND CODEINE PHOSPHATE 5 ML: 10; 100 LIQUID ORAL at 16:14

## 2021-12-18 RX ADMIN — Medication 10 ML: at 08:17

## 2021-12-18 RX ADMIN — GUAIFENESIN AND CODEINE PHOSPHATE 5 ML: 10; 100 LIQUID ORAL at 08:15

## 2021-12-18 RX ADMIN — OXYCODONE HYDROCHLORIDE AND ACETAMINOPHEN 500 MG: 500 TABLET ORAL at 08:15

## 2021-12-18 RX ADMIN — Medication 10 ML: at 21:15

## 2021-12-18 RX ADMIN — Medication 2 PUFF: at 21:07

## 2021-12-18 RX ADMIN — DEXAMETHASONE SODIUM PHOSPHATE 6 MG: 10 INJECTION INTRAMUSCULAR; INTRAVENOUS at 02:56

## 2021-12-18 RX ADMIN — Medication 6000 UNITS: at 08:15

## 2021-12-18 RX ADMIN — ENOXAPARIN SODIUM 30 MG: 100 INJECTION SUBCUTANEOUS at 21:15

## 2021-12-18 RX ADMIN — ZINC SULFATE 220 MG (50 MG) CAPSULE 50 MG: CAPSULE at 08:15

## 2021-12-18 RX ADMIN — GUAIFENESIN AND CODEINE PHOSPHATE 5 ML: 10; 100 LIQUID ORAL at 12:15

## 2021-12-18 RX ADMIN — Medication 2 PUFF: at 09:22

## 2021-12-18 RX ADMIN — GUAIFENESIN AND CODEINE PHOSPHATE 5 ML: 10; 100 LIQUID ORAL at 21:14

## 2021-12-18 RX ADMIN — GUAIFENESIN AND CODEINE PHOSPHATE 5 ML: 10; 100 LIQUID ORAL at 02:56

## 2021-12-18 RX ADMIN — PANTOPRAZOLE SODIUM 40 MG: 40 TABLET, DELAYED RELEASE ORAL at 06:12

## 2021-12-18 RX ADMIN — ENOXAPARIN SODIUM 30 MG: 100 INJECTION SUBCUTANEOUS at 08:15

## 2021-12-18 ASSESSMENT — PAIN SCALES - GENERAL
PAINLEVEL_OUTOF10: 0

## 2021-12-18 NOTE — PROGRESS NOTES
Pt a/o. Frequent coughing, medicated per [facility] with medication for cough. No nausea; no emesis. SpO2 88-91% on 6 L NC oxygen. Pt very hesitant about having to go on HFNC and does not want to do so. Writer provided pt with written material on Actemra as well (on hand out from [employer] and another hand out tubed up by Pharmacy). Writer update . Call light within reach; will continue to monitor.

## 2021-12-18 NOTE — PROGRESS NOTES
Hospitalist Progress Note    Patient:  Shruthi Medina  Unit/Bed:0335/0335-01   YOB: 1960       MRN: [id number] Acct: [de-identified]  PCP: KIMBERLY Conde CNP    Date of Admission: 12/16/2021  --------------------------    Chief Complaint:     Shortness of breath,    Hospital Course:     Shruthi Medina is a 64 y.o. female hospitalized on 12/16/2021   recently diagnosed with COVID-19 pneumonia. Assessment/plan:     Acute hypoxic respiratory failure    Worsening, currently on 6 L, continue close monitor    COVID-19 pneumonia  Unvaccinated. Patient worried about vaccination as she reported significant arm swelling after whooping cough vaccine. Symptom onset ~ 12/5  Date of diagnosis/hospitalization 12/16/2021    Significantly elevated inflammatory marker  Discuss with the patient that if her oxygenation continue to be worsen she may benefit from Actemra, would provide information with the patient so she can make mutual decision with her family. Continue steroid    Glucose intolerance,  Blood sugar reading noted. Obesity, body mass index 34,        Code Status: Full Code         DVT prophylaxis: Lovenox     Disposition: Awaiting improvement of hypoxemia      Discussed with the patient in detail      ----------------      Subjective:     Patient seen and examined  Overnight events noted  RN and ancillary staff note reviewed    Noted increase of oxygen requirement up to 6 L, currently satting in the low 90s. Stated that she feels fine, still have intermittent cough otherwise no other symptoms. Eating well. Diet: ADULT DIET; Regular    OBJECTIVE     Exam:  /76   Pulse 73   Temp 97.6 °F (36.4 °C) (Oral)   Resp 18   Ht 5' 5\" (1.651 m)   Wt 209 lb (94.8 kg)   LMP 02/27/2012   SpO2 (!) 89%   BMI 34.78 kg/m²         Unchanged physical finding from 12/17/2021 as below  Gen: Not in distress. Alert. Head: Normocephalic. Atraumatic. Eyes: Conjunctivae/corneas clear.   ENT: Oral mucosa moist  Neck: No JVD. No obvious thyromegaly. CVS: Nml S1S2, no murmur , RRR  Pulmomary: Reduced air entry  Gastrointestinal: Soft, non tender, non distend, . Musculoskeletal: No edema. Warm  Neuro: No focal deficit. Moves extremity spontaneously. Psychiatry: Appropriate affect. Not agitated. Medications:  Reviewed    Infusion Medications    sodium chloride       Scheduled Medications    sodium chloride flush  5-40 mL IntraVENous 2 times per day    enoxaparin  30 mg SubCUTAneous BID    pantoprazole  40 mg Oral QAM AC    dexamethasone  6 mg IntraVENous Q24H    Vitamin D  6,000 Units Oral Daily    Followed by   Inder Johnson ON 12/24/2021] Vitamin D  2,000 Units Oral Daily    ascorbic acid  500 mg Oral Daily    zinc sulfate  50 mg Oral Daily    budesonide-formoterol  2 puff Inhalation BID     PRN Meds: sodium chloride flush, sodium chloride, ondansetron **OR** ondansetron, polyethylene glycol, acetaminophen **OR** acetaminophen, guaiFENesin-codeine, albuterol sulfate HFA      Intake/Output Summary (Last 24 hours) at 12/18/2021 1202  Last data filed at 12/18/2021 0817  Gross per 24 hour   Intake 10 ml   Output 4 ml   Net 6 ml             Labs:   Recent Labs     12/16/21  1704 12/17/21  0641 12/18/21  0924   WBC 5.7 3.7* 6.4   HGB 13.9 13.2 14.2   HCT 40.8 38.9 42.4    327 460*     Recent Labs     12/16/21  1704 12/17/21  0641 12/18/21  0924    139 138   K 3.7 4.1 4.3   CL 97* 102 100   CO2 24 22 22   BUN 28* 28* 36*   CREATININE 0.8 0.6 0.6   CALCIUM 8.9 8.7 9.0     Recent Labs     12/16/21  1704 12/17/21  0641 12/18/21  0924   AST 42* 32 23   ALT 22 21 18   BILIDIR  --   --  <0.2   BILITOT 0.9 0.5 0.5   ALKPHOS 129 125 114     No results for input(s): INR in the last 72 hours.   Recent Labs     12/16/21  1704   TROPONINI <0.01       Urinalysis:      Lab Results   Component Value Date    NITRU Negative 12/17/2021    WBCUA 0-2 12/17/2021    BACTERIA 3+ 12/17/2021    RBCUA 3-4 12/17/2021    BLOODU Negative 12/17/2021    SPECGRAV 1.020 12/17/2021    GLUCOSEU Negative 12/17/2021       Radiology:  XR CHEST PORTABLE   Final Result   Multifocal airspace disease. Correlate with presentation. Follow-up to   resolution recommended.                      Electronically signed by Shabbir Elmore MD on 12/18/2021 at 12:02 PM

## 2021-12-19 LAB
ALBUMIN SERPL-MCNC: 3 G/DL (ref 3.4–5)
ALP BLD-CCNC: 106 U/L (ref 40–129)
ALT SERPL-CCNC: 21 U/L (ref 10–40)
ANION GAP SERPL CALCULATED.3IONS-SCNC: 14 MMOL/L (ref 3–16)
AST SERPL-CCNC: 26 U/L (ref 15–37)
BASOPHILS ABSOLUTE: 0 K/UL (ref 0–0.2)
BASOPHILS RELATIVE PERCENT: 0.1 %
BILIRUB SERPL-MCNC: 0.5 MG/DL (ref 0–1)
BILIRUBIN DIRECT: <0.2 MG/DL (ref 0–0.3)
BILIRUBIN, INDIRECT: NORMAL MG/DL (ref 0–1)
BUN BLDV-MCNC: 36 MG/DL (ref 7–20)
CALCIUM SERPL-MCNC: 9.5 MG/DL (ref 8.3–10.6)
CHLORIDE BLD-SCNC: 101 MMOL/L (ref 99–110)
CO2: 23 MMOL/L (ref 21–32)
CREAT SERPL-MCNC: 0.7 MG/DL (ref 0.6–1.2)
EOSINOPHILS ABSOLUTE: 0 K/UL (ref 0–0.6)
EOSINOPHILS RELATIVE PERCENT: 0 %
GFR AFRICAN AMERICAN: >60
GFR NON-AFRICAN AMERICAN: >60
GLUCOSE BLD-MCNC: 194 MG/DL (ref 70–99)
HCT VFR BLD CALC: 41.9 % (ref 36–48)
HEMOGLOBIN: 14.1 G/DL (ref 12–16)
LYMPHOCYTES ABSOLUTE: 0.4 K/UL (ref 1–5.1)
LYMPHOCYTES RELATIVE PERCENT: 7.3 %
MCH RBC QN AUTO: 28.9 PG (ref 26–34)
MCHC RBC AUTO-ENTMCNC: 33.6 G/DL (ref 31–36)
MCV RBC AUTO: 86 FL (ref 80–100)
MONOCYTES ABSOLUTE: 0.4 K/UL (ref 0–1.3)
MONOCYTES RELATIVE PERCENT: 7.1 %
NEUTROPHILS ABSOLUTE: 5.1 K/UL (ref 1.7–7.7)
NEUTROPHILS RELATIVE PERCENT: 85.5 %
PDW BLD-RTO: 14 % (ref 12.4–15.4)
PHOSPHORUS: 3.4 MG/DL (ref 2.5–4.9)
PLATELET # BLD: 422 K/UL (ref 135–450)
PMV BLD AUTO: 7.7 FL (ref 5–10.5)
POTASSIUM SERPL-SCNC: 4.4 MMOL/L (ref 3.5–5.1)
PROCALCITONIN: 0.19 NG/ML (ref 0–0.15)
RBC # BLD: 4.87 M/UL (ref 4–5.2)
SODIUM BLD-SCNC: 138 MMOL/L (ref 136–145)
TOTAL PROTEIN: 7.4 G/DL (ref 6.4–8.2)
WBC # BLD: 6 K/UL (ref 4–11)

## 2021-12-19 PROCEDURE — 94640 AIRWAY INHALATION TREATMENT: CPT

## 2021-12-19 PROCEDURE — 2580000003 HC RX 258: Performed by: NURSE PRACTITIONER

## 2021-12-19 PROCEDURE — 85025 COMPLETE CBC W/AUTO DIFF WBC: CPT

## 2021-12-19 PROCEDURE — 2700000000 HC OXYGEN THERAPY PER DAY

## 2021-12-19 PROCEDURE — 6370000000 HC RX 637 (ALT 250 FOR IP): Performed by: NURSE PRACTITIONER

## 2021-12-19 PROCEDURE — 36415 COLL VENOUS BLD VENIPUNCTURE: CPT

## 2021-12-19 PROCEDURE — 80069 RENAL FUNCTION PANEL: CPT

## 2021-12-19 PROCEDURE — 6360000002 HC RX W HCPCS: Performed by: NURSE PRACTITIONER

## 2021-12-19 PROCEDURE — 94761 N-INVAS EAR/PLS OXIMETRY MLT: CPT

## 2021-12-19 PROCEDURE — 84145 PROCALCITONIN (PCT): CPT

## 2021-12-19 PROCEDURE — 80076 HEPATIC FUNCTION PANEL: CPT

## 2021-12-19 PROCEDURE — 1200000000 HC SEMI PRIVATE

## 2021-12-19 RX ADMIN — GUAIFENESIN AND CODEINE PHOSPHATE 5 ML: 10; 100 LIQUID ORAL at 10:35

## 2021-12-19 RX ADMIN — Medication 2 PUFF: at 19:49

## 2021-12-19 RX ADMIN — GUAIFENESIN AND CODEINE PHOSPHATE 5 ML: 10; 100 LIQUID ORAL at 22:52

## 2021-12-19 RX ADMIN — Medication 2 PUFF: at 08:00

## 2021-12-19 RX ADMIN — OXYCODONE HYDROCHLORIDE AND ACETAMINOPHEN 500 MG: 500 TABLET ORAL at 08:55

## 2021-12-19 RX ADMIN — ENOXAPARIN SODIUM 30 MG: 100 INJECTION SUBCUTANEOUS at 20:27

## 2021-12-19 RX ADMIN — PANTOPRAZOLE SODIUM 40 MG: 40 TABLET, DELAYED RELEASE ORAL at 06:30

## 2021-12-19 RX ADMIN — GUAIFENESIN AND CODEINE PHOSPHATE 5 ML: 10; 100 LIQUID ORAL at 14:35

## 2021-12-19 RX ADMIN — Medication 6000 UNITS: at 08:55

## 2021-12-19 RX ADMIN — ZINC SULFATE 220 MG (50 MG) CAPSULE 50 MG: CAPSULE at 08:55

## 2021-12-19 RX ADMIN — Medication 10 ML: at 20:27

## 2021-12-19 RX ADMIN — ENOXAPARIN SODIUM 30 MG: 100 INJECTION SUBCUTANEOUS at 08:54

## 2021-12-19 RX ADMIN — GUAIFENESIN AND CODEINE PHOSPHATE 5 ML: 10; 100 LIQUID ORAL at 06:31

## 2021-12-19 RX ADMIN — Medication 10 ML: at 08:53

## 2021-12-19 RX ADMIN — GUAIFENESIN AND CODEINE PHOSPHATE 5 ML: 10; 100 LIQUID ORAL at 18:42

## 2021-12-19 RX ADMIN — DEXAMETHASONE SODIUM PHOSPHATE 6 MG: 10 INJECTION INTRAMUSCULAR; INTRAVENOUS at 02:25

## 2021-12-19 RX ADMIN — GUAIFENESIN AND CODEINE PHOSPHATE 5 ML: 10; 100 LIQUID ORAL at 02:25

## 2021-12-19 ASSESSMENT — PAIN SCALES - GENERAL: PAINLEVEL_OUTOF10: 0

## 2021-12-19 NOTE — PROGRESS NOTES
Pt a/o. Pt stated no nausea; no emesis. Medicated per [facility] per pt request throughout the shift for frequent coughing. SpO2 low 90s on 6 L NC and occasionally drops to 88% but comes back up to 90s. SOB on exertion. Call light within reach; will continue to monitor.

## 2021-12-19 NOTE — PLAN OF CARE
Problem: Falls - Risk of:  Goal: Will remain free from falls  Description: Will remain free from falls  12/19/2021 1229 by Von Hobbs RN  Outcome: Ongoing  Note: Pt remain safe. Independent with ADLs and steady gait. Uses call light appropriately to call out for assistance. Bed locked in lowest position; side rails 2/4; call light and bedside table within reach of pt.

## 2021-12-19 NOTE — PLAN OF CARE
Problem: Airway Clearance - Ineffective  Goal: Achieve or maintain patent airway  Outcome: Ongoing     Problem: Gas Exchange - Impaired  Goal: Absence of hypoxia  Outcome: Ongoing  Goal: Promote optimal lung function  Outcome: Ongoing     Problem:  Body Temperature -  Risk of, Imbalanced  Goal: Ability to maintain a body temperature within defined limits  Outcome: Ongoing  Goal: Will regain or maintain usual level of consciousness  Outcome: Ongoing  Goal: Complications related to the disease process, condition or treatment will be avoided or minimized  Outcome: Ongoing     Problem: Isolation Precautions - Risk of Spread of Infection  Goal: Prevent transmission of infection  Outcome: Ongoing

## 2021-12-19 NOTE — PROGRESS NOTES
.  Musculoskeletal: No edema. Warm  Neuro: No focal deficit. Moves extremity spontaneously. Psychiatry: Appropriate affect. Not agitated. Medications:  Reviewed    Infusion Medications    sodium chloride       Scheduled Medications    sodium chloride flush  5-40 mL IntraVENous 2 times per day    enoxaparin  30 mg SubCUTAneous BID    pantoprazole  40 mg Oral QAM AC    dexamethasone  6 mg IntraVENous Q24H    Vitamin D  6,000 Units Oral Daily    Followed by   Lorrie Rides ON 12/24/2021] Vitamin D  2,000 Units Oral Daily    ascorbic acid  500 mg Oral Daily    zinc sulfate  50 mg Oral Daily    budesonide-formoterol  2 puff Inhalation BID     PRN Meds: sodium chloride flush, sodium chloride, ondansetron **OR** ondansetron, polyethylene glycol, acetaminophen **OR** acetaminophen, guaiFENesin-codeine, albuterol sulfate HFA      Intake/Output Summary (Last 24 hours) at 12/19/2021 1219  Last data filed at 12/19/2021 0853  Gross per 24 hour   Intake 130 ml   Output --   Net 130 ml             Labs:   Recent Labs     12/17/21  0641 12/18/21  0924 12/19/21  0657   WBC 3.7* 6.4 6.0   HGB 13.2 14.2 14.1   HCT 38.9 42.4 41.9    460* 422     Recent Labs     12/17/21  0641 12/18/21  0924 12/19/21  0657    138 138   K 4.1 4.3 4.4    100 101   CO2 22 22 23   BUN 28* 36* 36*   CREATININE 0.6 0.6 0.7   CALCIUM 8.7 9.0 9.5   PHOS  --   --  3.4     Recent Labs     12/17/21  0641 12/18/21  0924 12/19/21  0657   AST 32 23 26   ALT 21 18 21   BILIDIR  --  <0.2 <0.2   BILITOT 0.5 0.5 0.5   ALKPHOS 125 114 106     No results for input(s): INR in the last 72 hours.   Recent Labs     12/16/21  1704   TROPONINI <0.01       Urinalysis:      Lab Results   Component Value Date    NITRU Negative 12/17/2021    WBCUA 0-2 12/17/2021    BACTERIA 3+ 12/17/2021    RBCUA 3-4 12/17/2021    BLOODU Negative 12/17/2021    SPECGRAV 1.020 12/17/2021    GLUCOSEU Negative 12/17/2021       Radiology:  XR CHEST PORTABLE   Final Result Multifocal airspace disease. Correlate with presentation. Follow-up to   resolution recommended.                      Electronically signed by Lucero Marin MD on 12/19/2021 at 12:19 PM

## 2021-12-20 LAB
ALBUMIN SERPL-MCNC: 3.2 G/DL (ref 3.4–5)
ALP BLD-CCNC: 90 U/L (ref 40–129)
ALT SERPL-CCNC: 22 U/L (ref 10–40)
ANION GAP SERPL CALCULATED.3IONS-SCNC: 12 MMOL/L (ref 3–16)
AST SERPL-CCNC: 25 U/L (ref 15–37)
BANDED NEUTROPHILS RELATIVE PERCENT: 2 % (ref 0–7)
BASOPHILS ABSOLUTE: 0 K/UL (ref 0–0.2)
BASOPHILS RELATIVE PERCENT: 0 %
BILIRUB SERPL-MCNC: 0.4 MG/DL (ref 0–1)
BILIRUBIN DIRECT: <0.2 MG/DL (ref 0–0.3)
BILIRUBIN, INDIRECT: NORMAL MG/DL (ref 0–1)
BUN BLDV-MCNC: 26 MG/DL (ref 7–20)
CALCIUM SERPL-MCNC: 9.4 MG/DL (ref 8.3–10.6)
CHLORIDE BLD-SCNC: 103 MMOL/L (ref 99–110)
CO2: 23 MMOL/L (ref 21–32)
CREAT SERPL-MCNC: 0.6 MG/DL (ref 0.6–1.2)
EOSINOPHILS ABSOLUTE: 0 K/UL (ref 0–0.6)
EOSINOPHILS RELATIVE PERCENT: 0 %
GFR AFRICAN AMERICAN: >60
GFR NON-AFRICAN AMERICAN: >60
GLUCOSE BLD-MCNC: 197 MG/DL (ref 70–99)
HCT VFR BLD CALC: 40.5 % (ref 36–48)
HEMOGLOBIN: 13.6 G/DL (ref 12–16)
LYMPHOCYTES ABSOLUTE: 0.6 K/UL (ref 1–5.1)
LYMPHOCYTES RELATIVE PERCENT: 10 %
MCH RBC QN AUTO: 28.4 PG (ref 26–34)
MCHC RBC AUTO-ENTMCNC: 33.5 G/DL (ref 31–36)
MCV RBC AUTO: 84.6 FL (ref 80–100)
METAMYELOCYTES RELATIVE PERCENT: 1 %
MONOCYTES ABSOLUTE: 0.4 K/UL (ref 0–1.3)
MONOCYTES RELATIVE PERCENT: 7 %
NEUTROPHILS ABSOLUTE: 4.6 K/UL (ref 1.7–7.7)
NEUTROPHILS RELATIVE PERCENT: 80 %
PDW BLD-RTO: 13.5 % (ref 12.4–15.4)
PHOSPHORUS: 3.2 MG/DL (ref 2.5–4.9)
PLATELET # BLD: 408 K/UL (ref 135–450)
PMV BLD AUTO: 8.3 FL (ref 5–10.5)
POTASSIUM SERPL-SCNC: 4.6 MMOL/L (ref 3.5–5.1)
RBC # BLD: 4.79 M/UL (ref 4–5.2)
SLIDE REVIEW: ABNORMAL
SODIUM BLD-SCNC: 138 MMOL/L (ref 136–145)
TOTAL PROTEIN: 7.1 G/DL (ref 6.4–8.2)
WBC # BLD: 5.5 K/UL (ref 4–11)

## 2021-12-20 PROCEDURE — 2700000000 HC OXYGEN THERAPY PER DAY

## 2021-12-20 PROCEDURE — 85025 COMPLETE CBC W/AUTO DIFF WBC: CPT

## 2021-12-20 PROCEDURE — 2580000003 HC RX 258: Performed by: NURSE PRACTITIONER

## 2021-12-20 PROCEDURE — 1200000000 HC SEMI PRIVATE

## 2021-12-20 PROCEDURE — 94761 N-INVAS EAR/PLS OXIMETRY MLT: CPT

## 2021-12-20 PROCEDURE — 36415 COLL VENOUS BLD VENIPUNCTURE: CPT

## 2021-12-20 PROCEDURE — 80069 RENAL FUNCTION PANEL: CPT

## 2021-12-20 PROCEDURE — 80076 HEPATIC FUNCTION PANEL: CPT

## 2021-12-20 PROCEDURE — 94640 AIRWAY INHALATION TREATMENT: CPT

## 2021-12-20 PROCEDURE — 6360000002 HC RX W HCPCS: Performed by: NURSE PRACTITIONER

## 2021-12-20 PROCEDURE — 6370000000 HC RX 637 (ALT 250 FOR IP): Performed by: NURSE PRACTITIONER

## 2021-12-20 RX ADMIN — Medication 2 PUFF: at 07:58

## 2021-12-20 RX ADMIN — GUAIFENESIN AND CODEINE PHOSPHATE 5 ML: 10; 100 LIQUID ORAL at 05:28

## 2021-12-20 RX ADMIN — Medication 10 ML: at 01:19

## 2021-12-20 RX ADMIN — Medication 6000 UNITS: at 09:59

## 2021-12-20 RX ADMIN — DEXAMETHASONE SODIUM PHOSPHATE 6 MG: 10 INJECTION INTRAMUSCULAR; INTRAVENOUS at 01:19

## 2021-12-20 RX ADMIN — GUAIFENESIN AND CODEINE PHOSPHATE 5 ML: 10; 100 LIQUID ORAL at 10:01

## 2021-12-20 RX ADMIN — Medication 2 PUFF: at 20:24

## 2021-12-20 RX ADMIN — PANTOPRAZOLE SODIUM 40 MG: 40 TABLET, DELAYED RELEASE ORAL at 05:28

## 2021-12-20 RX ADMIN — ENOXAPARIN SODIUM 30 MG: 100 INJECTION SUBCUTANEOUS at 10:00

## 2021-12-20 RX ADMIN — ENOXAPARIN SODIUM 30 MG: 100 INJECTION SUBCUTANEOUS at 21:21

## 2021-12-20 RX ADMIN — Medication 10 ML: at 21:21

## 2021-12-20 RX ADMIN — ZINC SULFATE 220 MG (50 MG) CAPSULE 50 MG: CAPSULE at 09:59

## 2021-12-20 RX ADMIN — Medication 10 ML: at 10:00

## 2021-12-20 RX ADMIN — OXYCODONE HYDROCHLORIDE AND ACETAMINOPHEN 500 MG: 500 TABLET ORAL at 09:59

## 2021-12-20 RX ADMIN — GUAIFENESIN AND CODEINE PHOSPHATE 5 ML: 10; 100 LIQUID ORAL at 14:26

## 2021-12-20 RX ADMIN — GUAIFENESIN AND CODEINE PHOSPHATE 5 ML: 10; 100 LIQUID ORAL at 21:21

## 2021-12-20 ASSESSMENT — PAIN SCALES - GENERAL: PAINLEVEL_OUTOF10: 0

## 2021-12-20 NOTE — PROGRESS NOTES
presentation. Follow-up to   resolution recommended.                      Electronically signed by René Stearns MD on 12/20/2021 at 8:47 AM

## 2021-12-20 NOTE — PROGRESS NOTES
Assessment complete and charted earlier in shift. IS education provided and use encouraged- pt able to reach 1200 mL with good tolerance. Encouraged prone positioning. LCTA and diminished. Pt reports infrequent thin clear sputum and enhanced activity tolerance- did desat to 84% with getting up to the restroom. Call light within reach, will continue to monitor.

## 2021-12-20 NOTE — CARE COORDINATION
Chart reviewed day 4. Spoke with Dr Axel Sánchez. Stated patient will not d/c today remains on 6 LNC O2. Continuing to follow for clinical progress. Patient from home with spouse, Seng Ennis.  Chele Mejia RN

## 2021-12-21 LAB
ALBUMIN SERPL-MCNC: 3 G/DL (ref 3.4–5)
ALP BLD-CCNC: 76 U/L (ref 40–129)
ALT SERPL-CCNC: 24 U/L (ref 10–40)
ANION GAP SERPL CALCULATED.3IONS-SCNC: 10 MMOL/L (ref 3–16)
AST SERPL-CCNC: 25 U/L (ref 15–37)
BASOPHILS ABSOLUTE: 0 K/UL (ref 0–0.2)
BASOPHILS RELATIVE PERCENT: 0.1 %
BILIRUB SERPL-MCNC: 0.4 MG/DL (ref 0–1)
BILIRUBIN DIRECT: <0.2 MG/DL (ref 0–0.3)
BILIRUBIN, INDIRECT: NORMAL MG/DL (ref 0–1)
BUN BLDV-MCNC: 22 MG/DL (ref 7–20)
CALCIUM SERPL-MCNC: 8.6 MG/DL (ref 8.3–10.6)
CHLORIDE BLD-SCNC: 101 MMOL/L (ref 99–110)
CO2: 25 MMOL/L (ref 21–32)
CREAT SERPL-MCNC: 0.6 MG/DL (ref 0.6–1.2)
EOSINOPHILS ABSOLUTE: 0 K/UL (ref 0–0.6)
EOSINOPHILS RELATIVE PERCENT: 0.1 %
GFR AFRICAN AMERICAN: >60
GFR NON-AFRICAN AMERICAN: >60
GLUCOSE BLD-MCNC: 174 MG/DL (ref 70–99)
HCT VFR BLD CALC: 40.7 % (ref 36–48)
HEMOGLOBIN: 13.5 G/DL (ref 12–16)
LYMPHOCYTES ABSOLUTE: 0.7 K/UL (ref 1–5.1)
LYMPHOCYTES RELATIVE PERCENT: 8.6 %
MCH RBC QN AUTO: 28.4 PG (ref 26–34)
MCHC RBC AUTO-ENTMCNC: 33.2 G/DL (ref 31–36)
MCV RBC AUTO: 85.7 FL (ref 80–100)
MONOCYTES ABSOLUTE: 0.6 K/UL (ref 0–1.3)
MONOCYTES RELATIVE PERCENT: 7.6 %
NEUTROPHILS ABSOLUTE: 6.7 K/UL (ref 1.7–7.7)
NEUTROPHILS RELATIVE PERCENT: 83.6 %
PDW BLD-RTO: 13.4 % (ref 12.4–15.4)
PHOSPHORUS: 2.7 MG/DL (ref 2.5–4.9)
PLATELET # BLD: 339 K/UL (ref 135–450)
PMV BLD AUTO: 7.8 FL (ref 5–10.5)
POTASSIUM SERPL-SCNC: 4.8 MMOL/L (ref 3.5–5.1)
RBC # BLD: 4.75 M/UL (ref 4–5.2)
SODIUM BLD-SCNC: 136 MMOL/L (ref 136–145)
TOTAL PROTEIN: 6.4 G/DL (ref 6.4–8.2)
WBC # BLD: 8 K/UL (ref 4–11)

## 2021-12-21 PROCEDURE — 80069 RENAL FUNCTION PANEL: CPT

## 2021-12-21 PROCEDURE — 1200000000 HC SEMI PRIVATE

## 2021-12-21 PROCEDURE — 36415 COLL VENOUS BLD VENIPUNCTURE: CPT

## 2021-12-21 PROCEDURE — 94640 AIRWAY INHALATION TREATMENT: CPT

## 2021-12-21 PROCEDURE — 6360000002 HC RX W HCPCS: Performed by: NURSE PRACTITIONER

## 2021-12-21 PROCEDURE — 85025 COMPLETE CBC W/AUTO DIFF WBC: CPT

## 2021-12-21 PROCEDURE — 6370000000 HC RX 637 (ALT 250 FOR IP): Performed by: NURSE PRACTITIONER

## 2021-12-21 PROCEDURE — 2700000000 HC OXYGEN THERAPY PER DAY

## 2021-12-21 PROCEDURE — 2580000003 HC RX 258: Performed by: NURSE PRACTITIONER

## 2021-12-21 PROCEDURE — 80076 HEPATIC FUNCTION PANEL: CPT

## 2021-12-21 PROCEDURE — 94761 N-INVAS EAR/PLS OXIMETRY MLT: CPT

## 2021-12-21 RX ADMIN — ZINC SULFATE 220 MG (50 MG) CAPSULE 50 MG: CAPSULE at 09:19

## 2021-12-21 RX ADMIN — GUAIFENESIN AND CODEINE PHOSPHATE 5 ML: 10; 100 LIQUID ORAL at 21:34

## 2021-12-21 RX ADMIN — DEXAMETHASONE SODIUM PHOSPHATE 6 MG: 10 INJECTION INTRAMUSCULAR; INTRAVENOUS at 01:35

## 2021-12-21 RX ADMIN — GUAIFENESIN AND CODEINE PHOSPHATE 5 ML: 10; 100 LIQUID ORAL at 06:13

## 2021-12-21 RX ADMIN — GUAIFENESIN AND CODEINE PHOSPHATE 5 ML: 10; 100 LIQUID ORAL at 01:35

## 2021-12-21 RX ADMIN — OXYCODONE HYDROCHLORIDE AND ACETAMINOPHEN 500 MG: 500 TABLET ORAL at 09:20

## 2021-12-21 RX ADMIN — ENOXAPARIN SODIUM 30 MG: 100 INJECTION SUBCUTANEOUS at 09:19

## 2021-12-21 RX ADMIN — Medication 6000 UNITS: at 09:20

## 2021-12-21 RX ADMIN — ENOXAPARIN SODIUM 30 MG: 100 INJECTION SUBCUTANEOUS at 21:33

## 2021-12-21 RX ADMIN — Medication 2 PUFF: at 08:28

## 2021-12-21 RX ADMIN — Medication 2 PUFF: at 21:03

## 2021-12-21 RX ADMIN — Medication 10 ML: at 21:35

## 2021-12-21 RX ADMIN — GUAIFENESIN AND CODEINE PHOSPHATE 5 ML: 10; 100 LIQUID ORAL at 10:51

## 2021-12-21 RX ADMIN — Medication 10 ML: at 09:30

## 2021-12-21 RX ADMIN — PANTOPRAZOLE SODIUM 40 MG: 40 TABLET, DELAYED RELEASE ORAL at 06:12

## 2021-12-21 RX ADMIN — GUAIFENESIN AND CODEINE PHOSPHATE 5 ML: 10; 100 LIQUID ORAL at 17:37

## 2021-12-21 ASSESSMENT — PAIN SCALES - GENERAL
PAINLEVEL_OUTOF10: 0

## 2021-12-21 NOTE — PROGRESS NOTES
Pt A&O x4, VSS. Dyspnea with exertion. 6L O2 via NC; SpO2 92%. Pt desats to low 80s with ambulation; pt declines increase in O2 delivery d/t her quick recovery with rest. Education provided; pt expressed understanding and does not wish to increase O2 delivery higher than 6L at this time. Continuous pulse ox in use. ELLIE lung sounds d/t noise from CAPR. Educated pt on breathing exercises, IS use, and proning. Pt expressed and demonstrated understanding. Denies N/V; Pt denies flatus; ELLIE bowel sounds d/t noise from CAPR. Denies pain. Call light within reach. Bed side table within reach. Wheels locked. Bed in lowest position. Medium fall risk; declined bed alarm; independent with ADLs. Pt instructed to call out for assistance. Pt expressed understanding & calls out appropriately. Shift assessment complete. All care cont per orders.      Electronically signed by Russ Gonzalez RN on 12/20/2021 at 7:38 PM

## 2021-12-21 NOTE — CARE COORDINATION
Chart reviewed day 5. Continues with O2 at [facility]. Following for new O2 need at d/c. IPTA, from home with spouse.  Nadege Beltrán RN

## 2021-12-21 NOTE — PLAN OF CARE
Problem: Airway Clearance - Ineffective  Goal: Achieve or maintain patent airway  Outcome: Ongoing     Problem: Breathing Pattern - Ineffective  Goal: Ability to achieve and maintain a regular respiratory rate  Outcome: Ongoing     Problem: Isolation Precautions - Risk of Spread of Infection  Goal: Prevent transmission of infection  Outcome: Ongoing     Problem:  Body Temperature -  Risk of, Imbalanced  Goal: Ability to maintain a body temperature within defined limits  Outcome: Ongoing  Goal: Will regain or maintain usual level of consciousness  Outcome: Ongoing  Goal: Complications related to the disease process, condition or treatment will be avoided or minimized  Outcome: Ongoing

## 2021-12-21 NOTE — PROGRESS NOTES
Hospitalist Progress Note    Patient:  Hollis Escamilla  Unit/Bed:0335/0335-01   YOB: 1960       MRN: [id number] Acct: [de-identified]  PCP: KIMBERLY Peacock CNP    Date of Admission: 12/16/2021  --------------------------    Chief Complaint:     Shortness of breath,      Hospital Course:     Hollis Escamilla is a 64 y.o. female hospitalized on 12/16/2021   recently diagnosed with COVID-19 pneumonia. Assessment/plan:     Acute hypoxic respiratory failure      on 5 L, continue close monitor    COVID-19 pneumonia  Unvaccinated. Patient worried about vaccination as she reported significant arm swelling after whooping cough vaccine. Symptom onset ~ 12/5  Date of diagnosis/hospitalization 12/16/2021    Patient oxygen stabilized currently requiring 5 L,  C-reactive protein improving  Continue close monitoring  Continue steroid therapy  Decadron 6 mg daily     Glucose intolerance,  Blood sugar within acceptable level despite steroid therapy. Continue monitor    Obesity, body mass index 34        Code Status: Full Code         DVT prophylaxis: Lovenox     Disposition: Awaiting improvement of acute respiratory failure - 2 days ? Discussed with patient  Discussed with RN      ----------------      Subjective:     Patient seen and examined  Shortness of breath with ambulation to the bathroom otherwise no other complaint, no major overnight events, remains on 6 L    Diet: ADULT DIET; Regular    OBJECTIVE     Exam:  /65   Pulse 75   Temp 97.6 °F (36.4 °C) (Oral)   Resp 18   Ht 5' 5\" (1.651 m)   Wt 209 lb (94.8 kg)   LMP 02/27/2012   SpO2 94%   BMI 34.78 kg/m²        Gen: Not in distress. Alert. Head: Normocephalic. Atraumatic. Eyes: Conjunctivae/corneas clear. ENT: Oral mucosa moist  Neck: No JVD. No obvious thyromegaly. CVS: Nml S1S2, no murmur  , RRR  Pulmomary diminished air entry  Gastrointestinal: Soft, non tender, non distend, . Musculoskeletal: No edema.  Warm  Neuro: No focal deficit. Moves extremity spontaneously. Psychiatry: Appropriate affect. Not agitated. Medications:  Reviewed    Infusion Medications    sodium chloride       Scheduled Medications    sodium chloride flush  5-40 mL IntraVENous 2 times per day    enoxaparin  30 mg SubCUTAneous BID    pantoprazole  40 mg Oral QAM AC    dexamethasone  6 mg IntraVENous Q24H    Vitamin D  6,000 Units Oral Daily    Followed by   [facility] 12/24/2021] Vitamin D  2,000 Units Oral Daily    ascorbic acid  500 mg Oral Daily    zinc sulfate  50 mg Oral Daily    budesonide-formoterol  2 puff Inhalation BID     PRN Meds: sodium chloride flush, sodium chloride, ondansetron **OR** ondansetron, polyethylene glycol, acetaminophen **OR** acetaminophen, guaiFENesin-codeine, albuterol sulfate HFA      Intake/Output Summary (Last 24 hours) at 12/21/2021 0859  Last data filed at 12/20/2021 2212  Gross per 24 hour   Intake 490 ml   Output --   Net 490 ml             Labs:   Recent Labs     12/19/21  0657 12/20/21  0556 12/21/21  0540   WBC 6.0 5.5 8.0   HGB 14.1 13.6 13.5   HCT 41.9 40.5 40.7    408 339     Recent Labs     12/19/21  0657 12/20/21  0556 12/21/21  0540    138 136   K 4.4 4.6 4.8    103 101   CO2 23 23 25   BUN 36* 26* 22*   CREATININE 0.7 0.6 0.6   CALCIUM 9.5 9.4 8.6   PHOS 3.4 3.2 2.7     Recent Labs     12/19/21  0657 12/20/21  0556 12/21/21  0540   AST 26 25 25   ALT 21 22 24   BILIDIR <0.2 <0.2 <0.2   BILITOT 0.5 0.4 0.4   ALKPHOS 106 90 76     No results for input(s): INR in the last 72 hours. No results for input(s): Assunta Robles in the last 72 hours. Urinalysis:      Lab Results   Component Value Date    NITRU Negative 12/17/2021    WBCUA 0-2 12/17/2021    BACTERIA 3+ 12/17/2021    RBCUA 3-4 12/17/2021    BLOODU Negative 12/17/2021    SPECGRAV 1.020 12/17/2021    GLUCOSEU Negative 12/17/2021       Radiology:  XR CHEST PORTABLE   Final Result   Multifocal airspace disease.   Correlate with

## 2021-12-22 LAB
ALBUMIN SERPL-MCNC: 3 G/DL (ref 3.4–5)
ALP BLD-CCNC: 64 U/L (ref 40–129)
ALT SERPL-CCNC: 24 U/L (ref 10–40)
ANION GAP SERPL CALCULATED.3IONS-SCNC: 10 MMOL/L (ref 3–16)
AST SERPL-CCNC: 18 U/L (ref 15–37)
BASOPHILS ABSOLUTE: 0 K/UL (ref 0–0.2)
BASOPHILS RELATIVE PERCENT: 0.1 %
BILIRUB SERPL-MCNC: 0.3 MG/DL (ref 0–1)
BILIRUBIN DIRECT: <0.2 MG/DL (ref 0–0.3)
BILIRUBIN, INDIRECT: ABNORMAL MG/DL (ref 0–1)
BUN BLDV-MCNC: 20 MG/DL (ref 7–20)
CALCIUM SERPL-MCNC: 8.7 MG/DL (ref 8.3–10.6)
CHLORIDE BLD-SCNC: 103 MMOL/L (ref 99–110)
CO2: 24 MMOL/L (ref 21–32)
CREAT SERPL-MCNC: <0.5 MG/DL (ref 0.6–1.2)
EOSINOPHILS ABSOLUTE: 0 K/UL (ref 0–0.6)
EOSINOPHILS RELATIVE PERCENT: 0.2 %
GFR AFRICAN AMERICAN: >60
GFR NON-AFRICAN AMERICAN: >60
GLUCOSE BLD-MCNC: 168 MG/DL (ref 70–99)
HCT VFR BLD CALC: 39.3 % (ref 36–48)
HEMOGLOBIN: 13 G/DL (ref 12–16)
LYMPHOCYTES ABSOLUTE: 0.8 K/UL (ref 1–5.1)
LYMPHOCYTES RELATIVE PERCENT: 10.8 %
MCH RBC QN AUTO: 28.4 PG (ref 26–34)
MCHC RBC AUTO-ENTMCNC: 33.2 G/DL (ref 31–36)
MCV RBC AUTO: 85.4 FL (ref 80–100)
MONOCYTES ABSOLUTE: 0.6 K/UL (ref 0–1.3)
MONOCYTES RELATIVE PERCENT: 7.9 %
NEUTROPHILS ABSOLUTE: 6 K/UL (ref 1.7–7.7)
NEUTROPHILS RELATIVE PERCENT: 81 %
PDW BLD-RTO: 13.4 % (ref 12.4–15.4)
PHOSPHORUS: 2.8 MG/DL (ref 2.5–4.9)
PLATELET # BLD: 318 K/UL (ref 135–450)
PMV BLD AUTO: 8.2 FL (ref 5–10.5)
POTASSIUM SERPL-SCNC: 4.9 MMOL/L (ref 3.5–5.1)
RBC # BLD: 4.6 M/UL (ref 4–5.2)
SODIUM BLD-SCNC: 137 MMOL/L (ref 136–145)
TOTAL PROTEIN: 6.3 G/DL (ref 6.4–8.2)
WBC # BLD: 7.4 K/UL (ref 4–11)

## 2021-12-22 PROCEDURE — 94761 N-INVAS EAR/PLS OXIMETRY MLT: CPT

## 2021-12-22 PROCEDURE — 94640 AIRWAY INHALATION TREATMENT: CPT

## 2021-12-22 PROCEDURE — 6370000000 HC RX 637 (ALT 250 FOR IP): Performed by: NURSE PRACTITIONER

## 2021-12-22 PROCEDURE — 6360000002 HC RX W HCPCS: Performed by: NURSE PRACTITIONER

## 2021-12-22 PROCEDURE — 80069 RENAL FUNCTION PANEL: CPT

## 2021-12-22 PROCEDURE — 36415 COLL VENOUS BLD VENIPUNCTURE: CPT

## 2021-12-22 PROCEDURE — 1200000000 HC SEMI PRIVATE

## 2021-12-22 PROCEDURE — 85025 COMPLETE CBC W/AUTO DIFF WBC: CPT

## 2021-12-22 PROCEDURE — 2580000003 HC RX 258: Performed by: NURSE PRACTITIONER

## 2021-12-22 PROCEDURE — 80076 HEPATIC FUNCTION PANEL: CPT

## 2021-12-22 PROCEDURE — 2700000000 HC OXYGEN THERAPY PER DAY

## 2021-12-22 RX ADMIN — GUAIFENESIN AND CODEINE PHOSPHATE 5 ML: 10; 100 LIQUID ORAL at 01:57

## 2021-12-22 RX ADMIN — ENOXAPARIN SODIUM 30 MG: 100 INJECTION SUBCUTANEOUS at 09:44

## 2021-12-22 RX ADMIN — GUAIFENESIN AND CODEINE PHOSPHATE 5 ML: 10; 100 LIQUID ORAL at 06:27

## 2021-12-22 RX ADMIN — Medication 2 PUFF: at 20:05

## 2021-12-22 RX ADMIN — DEXAMETHASONE SODIUM PHOSPHATE 6 MG: 10 INJECTION INTRAMUSCULAR; INTRAVENOUS at 01:58

## 2021-12-22 RX ADMIN — GUAIFENESIN AND CODEINE PHOSPHATE 5 ML: 10; 100 LIQUID ORAL at 20:42

## 2021-12-22 RX ADMIN — OXYCODONE HYDROCHLORIDE AND ACETAMINOPHEN 500 MG: 500 TABLET ORAL at 09:44

## 2021-12-22 RX ADMIN — ZINC SULFATE 220 MG (50 MG) CAPSULE 50 MG: CAPSULE at 09:44

## 2021-12-22 RX ADMIN — PANTOPRAZOLE SODIUM 40 MG: 40 TABLET, DELAYED RELEASE ORAL at 06:27

## 2021-12-22 RX ADMIN — Medication 10 ML: at 09:45

## 2021-12-22 RX ADMIN — Medication 6000 UNITS: at 09:44

## 2021-12-22 RX ADMIN — Medication 10 ML: at 20:43

## 2021-12-22 RX ADMIN — Medication 2 PUFF: at 09:06

## 2021-12-22 RX ADMIN — ENOXAPARIN SODIUM 30 MG: 100 INJECTION SUBCUTANEOUS at 20:42

## 2021-12-22 ASSESSMENT — PAIN SCALES - GENERAL
PAINLEVEL_OUTOF10: 0
PAINLEVEL_OUTOF10: 0

## 2021-12-22 NOTE — PROGRESS NOTES
Hospitalist Progress Note    Patient:  George Randhawa  Unit/Bed:0335/0335-01   YOB: 1960       MRN: [id number] Acct: [de-identified]  PCP: KIMBERLY Childers CNP    Date of Admission: 12/16/2021  --------------------------    Chief Complaint:     Shortness of breath           Hospital Course:     George Randhawa is a 64 y.o. female hospitalized on 12/16/2021   recently diagnosed with COVID-19 pneumonia. Assessment/plan:     Acute hypoxic respiratory failure- improving       on 2-3  L, continue close monitor    COVID-19 pneumonia  Unvaccinated. Patient worried about vaccination as she reported significant arm swelling after whooping cough vaccine. Symptom onset ~ 12/5  Date of diagnosis/hospitalization 12/16/2021  currently requiring 3 L,  C-reactive protein improving  Continue close monitoring  Continue steroid therapy  Decadron 6 mg daily   Hemoptysis - pulmonology if persists     Glucose intolerance,  Blood sugar within acceptable level despite steroid therapy. Continue monitor    Obesity, body mass index 34    Hemoptysis - scanty x3  Monitor   H/H stable   May hold lovenox and pulm consult if persists         Code Status: Full Code         DVT prophylaxis: Lovenox     Disposition: Awaiting improvement of acute respiratory failure 1- 2 days    Discussed with patient  Discussed with RN      ----------------      Subjective:     Patient seen and examined  Shortness of breath  Improved   C/o hemoptysis  Blood staine sputum x 3 since yesterday angel   Diet: ADULT DIET; Regular    OBJECTIVE     Exam:  /60   Pulse 73   Temp 97.6 °F (36.4 °C) (Oral)   Resp 18   Ht 5' 5\" (1.651 m)   Wt 209 lb (94.8 kg)   LMP 02/27/2012   SpO2 94%   BMI 34.78 kg/m²        Gen: Not in distress. Alert. Head: Normocephalic. Atraumatic. Eyes: Conjunctivae/corneas clear. ENT: Oral mucosa moist  Neck: No JVD. No obvious thyromegaly.   CVS: Nml S1S2, no murmur  , RRR  Pulmomary diminished air entry  Gastrointestinal: Soft, non tender, non distend, . Musculoskeletal: No edema. Warm  Neuro: No focal deficit. Moves extremity spontaneously. Psychiatry: Appropriate affect. Not agitated. Medications:  Reviewed    Infusion Medications    sodium chloride       Scheduled Medications    sodium chloride flush  5-40 mL IntraVENous 2 times per day    enoxaparin  30 mg SubCUTAneous BID    pantoprazole  40 mg Oral QAM AC    dexamethasone  6 mg IntraVENous Q24H    Vitamin D  6,000 Units Oral Daily    Followed by   Trudi Otto ON 12/24/2021] Vitamin D  2,000 Units Oral Daily    ascorbic acid  500 mg Oral Daily    zinc sulfate  50 mg Oral Daily    budesonide-formoterol  2 puff Inhalation BID     PRN Meds: sodium chloride flush, sodium chloride, ondansetron **OR** ondansetron, polyethylene glycol, acetaminophen **OR** acetaminophen, guaiFENesin-codeine, albuterol sulfate HFA      Intake/Output Summary (Last 24 hours) at 12/22/2021 0838  Last data filed at 12/22/2021 0316  Gross per 24 hour   Intake 1440 ml   Output --   Net 1440 ml             Labs:   Recent Labs     12/20/21  0556 12/21/21  0540 12/22/21  0520   WBC 5.5 8.0 7.4   HGB 13.6 13.5 13.0   HCT 40.5 40.7 39.3    339 318     Recent Labs     12/20/21  0556 12/21/21  0540 12/22/21  0520    136 137   K 4.6 4.8 4.9    101 103   CO2 23 25 24   BUN 26* 22* 20   CREATININE 0.6 0.6 <0.5*   CALCIUM 9.4 8.6 8.7   PHOS 3.2 2.7 2.8     Recent Labs     12/20/21  0556 12/21/21  0540 12/22/21  0520   AST 25 25 18   ALT 22 24 24   BILIDIR <0.2 <0.2 <0.2   BILITOT 0.4 0.4 0.3   ALKPHOS 90 76 64     No results for input(s): INR in the last 72 hours. No results for input(s): Ida Muñiz in the last 72 hours.     Urinalysis:      Lab Results   Component Value Date    NITRU Negative 12/17/2021    WBCUA 0-2 12/17/2021    BACTERIA 3+ 12/17/2021    RBCUA 3-4 12/17/2021    BLOODU Negative 12/17/2021    SPECGRAV 1.020 12/17/2021    GLUCOSEU Negative 12/17/2021       Radiology:  XR CHEST PORTABLE   Final Result   Multifocal airspace disease. Correlate with presentation. Follow-up to   resolution recommended.                      Electronically signed by Jose Coyd MD on 12/22/2021 at 8:38 AM

## 2021-12-22 NOTE — CARE COORDINATION
Chart reviewed day 6. Currently on 4LNC O2. Following for new O2 need. IPTA from home with spouse.  Gia Sánchez RN

## 2021-12-22 NOTE — PROGRESS NOTES
Pt encouraged to use Incentive spirometer. Pt states that she is using it. Oxygen has been decreased to 4L/NC. Raymundo Morgan RN

## 2021-12-23 VITALS
BODY MASS INDEX: 34.82 KG/M2 | HEIGHT: 65 IN | SYSTOLIC BLOOD PRESSURE: 122 MMHG | WEIGHT: 209 LBS | RESPIRATION RATE: 16 BRPM | OXYGEN SATURATION: 91 % | DIASTOLIC BLOOD PRESSURE: 74 MMHG | HEART RATE: 88 BPM | TEMPERATURE: 98.5 F

## 2021-12-23 LAB
ALBUMIN SERPL-MCNC: 3.2 G/DL (ref 3.4–5)
ALP BLD-CCNC: 60 U/L (ref 40–129)
ALT SERPL-CCNC: 25 U/L (ref 10–40)
ANION GAP SERPL CALCULATED.3IONS-SCNC: 12 MMOL/L (ref 3–16)
AST SERPL-CCNC: 15 U/L (ref 15–37)
BANDED NEUTROPHILS RELATIVE PERCENT: 6 % (ref 0–7)
BASOPHILS ABSOLUTE: 0 K/UL (ref 0–0.2)
BASOPHILS RELATIVE PERCENT: 0 %
BILIRUB SERPL-MCNC: 0.3 MG/DL (ref 0–1)
BILIRUBIN DIRECT: <0.2 MG/DL (ref 0–0.3)
BILIRUBIN, INDIRECT: NORMAL MG/DL (ref 0–1)
BUN BLDV-MCNC: 20 MG/DL (ref 7–20)
CALCIUM SERPL-MCNC: 9.1 MG/DL (ref 8.3–10.6)
CHLORIDE BLD-SCNC: 102 MMOL/L (ref 99–110)
CO2: 24 MMOL/L (ref 21–32)
CREAT SERPL-MCNC: <0.5 MG/DL (ref 0.6–1.2)
EOSINOPHILS ABSOLUTE: 0 K/UL (ref 0–0.6)
EOSINOPHILS RELATIVE PERCENT: 0 %
GFR AFRICAN AMERICAN: >60
GFR NON-AFRICAN AMERICAN: >60
GLUCOSE BLD-MCNC: 196 MG/DL (ref 70–99)
HCT VFR BLD CALC: 38.8 % (ref 36–48)
HEMOGLOBIN: 13.1 G/DL (ref 12–16)
LYMPHOCYTES ABSOLUTE: 0.8 K/UL (ref 1–5.1)
LYMPHOCYTES RELATIVE PERCENT: 11 %
MCH RBC QN AUTO: 28.6 PG (ref 26–34)
MCHC RBC AUTO-ENTMCNC: 33.9 G/DL (ref 31–36)
MCV RBC AUTO: 84.4 FL (ref 80–100)
MONOCYTES ABSOLUTE: 0.6 K/UL (ref 0–1.3)
MONOCYTES RELATIVE PERCENT: 8 %
NEUTROPHILS ABSOLUTE: 5.8 K/UL (ref 1.7–7.7)
NEUTROPHILS RELATIVE PERCENT: 75 %
PDW BLD-RTO: 13.2 % (ref 12.4–15.4)
PHOSPHORUS: 3.4 MG/DL (ref 2.5–4.9)
PLATELET # BLD: 302 K/UL (ref 135–450)
PMV BLD AUTO: 8.5 FL (ref 5–10.5)
POTASSIUM SERPL-SCNC: 4.7 MMOL/L (ref 3.5–5.1)
RBC # BLD: 4.59 M/UL (ref 4–5.2)
SLIDE REVIEW: ABNORMAL
SODIUM BLD-SCNC: 138 MMOL/L (ref 136–145)
TOTAL PROTEIN: 6.4 G/DL (ref 6.4–8.2)
WBC # BLD: 7.1 K/UL (ref 4–11)

## 2021-12-23 PROCEDURE — 6370000000 HC RX 637 (ALT 250 FOR IP): Performed by: NURSE PRACTITIONER

## 2021-12-23 PROCEDURE — 2580000003 HC RX 258: Performed by: NURSE PRACTITIONER

## 2021-12-23 PROCEDURE — 80076 HEPATIC FUNCTION PANEL: CPT

## 2021-12-23 PROCEDURE — 6360000002 HC RX W HCPCS: Performed by: NURSE PRACTITIONER

## 2021-12-23 PROCEDURE — 94640 AIRWAY INHALATION TREATMENT: CPT

## 2021-12-23 PROCEDURE — 80069 RENAL FUNCTION PANEL: CPT

## 2021-12-23 PROCEDURE — 36415 COLL VENOUS BLD VENIPUNCTURE: CPT

## 2021-12-23 PROCEDURE — 85025 COMPLETE CBC W/AUTO DIFF WBC: CPT

## 2021-12-23 RX ORDER — ZINC SULFATE 50(220)MG
50 CAPSULE ORAL DAILY
Qty: 30 CAPSULE | Refills: 0 | COMMUNITY
Start: 2021-12-24 | End: 2022-05-09 | Stop reason: ALTCHOICE

## 2021-12-23 RX ORDER — CHOLECALCIFEROL (VITAMIN D3) 50 MCG
2000 TABLET ORAL DAILY
Qty: 60 TABLET | Refills: 0 | Status: SHIPPED | OUTPATIENT
Start: 2021-12-24 | End: 2022-02-15

## 2021-12-23 RX ORDER — PREDNISONE 20 MG/1
40 TABLET ORAL DAILY
Qty: 6 TABLET | Refills: 0 | Status: SHIPPED | OUTPATIENT
Start: 2021-12-23 | End: 2021-12-26

## 2021-12-23 RX ORDER — PANTOPRAZOLE SODIUM 40 MG/1
40 TABLET, DELAYED RELEASE ORAL
Qty: 30 TABLET | Refills: 0 | Status: SHIPPED | OUTPATIENT
Start: 2021-12-24 | End: 2022-02-15

## 2021-12-23 RX ORDER — ALBUTEROL SULFATE 90 UG/1
2 AEROSOL, METERED RESPIRATORY (INHALATION) EVERY 4 HOURS PRN
Qty: 18 G | Refills: 3 | Status: SHIPPED | OUTPATIENT
Start: 2021-12-23 | End: 2022-02-15

## 2021-12-23 RX ORDER — ASCORBIC ACID 500 MG
500 TABLET ORAL DAILY
Qty: 30 TABLET | Refills: 0 | Status: SHIPPED | OUTPATIENT
Start: 2021-12-24 | End: 2022-05-09 | Stop reason: ALTCHOICE

## 2021-12-23 RX ADMIN — DEXAMETHASONE SODIUM PHOSPHATE 6 MG: 10 INJECTION INTRAMUSCULAR; INTRAVENOUS at 02:19

## 2021-12-23 RX ADMIN — Medication 6000 UNITS: at 10:36

## 2021-12-23 RX ADMIN — Medication 10 ML: at 10:36

## 2021-12-23 RX ADMIN — OXYCODONE HYDROCHLORIDE AND ACETAMINOPHEN 500 MG: 500 TABLET ORAL at 10:36

## 2021-12-23 RX ADMIN — Medication 2 PUFF: at 08:00

## 2021-12-23 RX ADMIN — ENOXAPARIN SODIUM 30 MG: 100 INJECTION SUBCUTANEOUS at 10:36

## 2021-12-23 RX ADMIN — PANTOPRAZOLE SODIUM 40 MG: 40 TABLET, DELAYED RELEASE ORAL at 06:06

## 2021-12-23 RX ADMIN — ZINC SULFATE 220 MG (50 MG) CAPSULE 50 MG: CAPSULE at 10:36

## 2021-12-23 ASSESSMENT — PAIN SCALES - GENERAL: PAINLEVEL_OUTOF10: 0

## 2021-12-23 NOTE — DISCHARGE SUMMARY
Hospital Medicine Discharge Summary    Patient ID: Lillie Jarvis      Patient's PCP: Waleska Weiner, APRN - CNP    Admit Date: 12/16/2021     Discharge Date:   12/23/21     Admitting Provider: Robinson Mackey DO     Discharge Provider: Milton Roche MD     Discharge Diagnoses: Active Hospital Problems    Diagnosis     Acute respiratory failure with hypoxia ([facility] [address] [J96.01]     Pneumonia due to COVID-19 virus [U07.1, J12.82]     2019 novel coronavirus-infected pneumonia (NCIP) [U07.1, J12.82]     History of prediabetes [Z87.898]     Obesity (BMI 30-39. 9) [E66.9]        The patient was seen and examined on day of discharge and this discharge summary is in conjunction with any daily progress note from day of discharge. Hospital Course:   Acute hypoxic respiratory failure- improving        on <2  L,       COVID-19 pneumonia  Unvaccinated. Patient worried about vaccination as she reported significant arm swelling after whooping cough vaccine. Symptom onset ~ 12/5  Date of diagnosis/hospitalization 12/16/2021  currently requiring <2 L,  Continue steroid therapy- total 10  days         Glucose intolerance,  Blood sugar within acceptable level       Obesity, body mass index 34     Hemoptysis - scanty x3   no recurrence    H/H stable              Physical Exam Performed:     /74 Comment: 122/74  Pulse 88   Temp 98.5 °F (36.9 °C) (Oral)   Resp 16   Ht 5' 5\" (1.651 m)   Wt 209 lb (94.8 kg)   LMP 02/27/2012   SpO2 91%   BMI 34.78 kg/m²       General appearance:  No apparent distress, appears stated age and cooperative. HEENT:  Normal cephalic, atraumatic without obvious deformity. .  Extra ocular muscles intact. Conjunctivae/corneas clear. Neck: Supple, with full range of motion. No jugular venous distention. Trachea midline. Respiratory:  Normal respiratory effort. Clear to auscultation, bilaterally without Rales/Wheezes/Rhonchi.   Cardiovascular:  Regular rate and rhythm with normal S1/S2 without murmurs, rubs or gallops. Abdomen: Soft, non-tender, non-distended with normal bowel sounds. Musculoskeletal:  No clubbing, cyanosis or edema bilaterally. Full range of motion without deformity. Skin: Skin color, texture, turgor normal.  No rashes or lesions. Neurologic:  Neurovascularly intact without any focal sensory/motor deficits. .  Psychiatric:  Alert and oriented, thought content appropriate, normal insight  Capillary Refill: Brisk,< 3 seconds   Peripheral Pulses: +2 palpable, equal bilaterally       Labs: For convenience and continuity at follow-up the following most recent labs are provided:      CBC:    Lab Results   Component Value Date    WBC 7.1 12/23/2021    HGB 13.1 12/23/2021    HCT 38.8 12/23/2021     12/23/2021       Renal:    Lab Results   Component Value Date     12/23/2021    K 4.7 12/23/2021    K 4.1 12/17/2021     12/23/2021    CO2 24 12/23/2021    BUN 20 12/23/2021    CREATININE <0.5 12/23/2021    CALCIUM 9.1 12/23/2021    PHOS 3.4 12/23/2021         Significant Diagnostic Studies    Radiology:   XR CHEST PORTABLE   Final Result   Multifocal airspace disease. Correlate with presentation. Follow-up to   resolution recommended.                 Consults:     None    Disposition: home      Condition at Discharge: Stable    Discharge Instructions/Follow-up:  PCP/ o2 / pulse ox monitor prn, isolation till 12/26     Code Status:  Full Code     Activity: activity as tolerated    Diet: regular diet      Discharge Medications:     Current Discharge Medication List           Details   albuterol sulfate  (90 Base) MCG/ACT inhaler Inhale 2 puffs into the lungs every 4 hours as needed for Wheezing  Qty: 18 g, Refills: 3      pantoprazole (PROTONIX) 40 MG tablet Take 1 tablet by mouth every morning (before breakfast)  Qty: 30 tablet, Refills: 0      ascorbic acid (VITAMIN C) 500 MG tablet Take 1 tablet by mouth daily  Qty: 30 tablet, Refills: 0      Vitamin D (CHOLECALCIFEROL) 50 MCG (2000 UT) TABS tablet Take 1 tablet by mouth daily  Qty: 60 tablet, Refills: 0    Comments: Labeling may look different. 25 mcg=1000 Units. Please double check dosages. zinc sulfate (ZINCATE) 220 (50 Zn) MG capsule Take 1 capsule by mouth daily  Qty: 30 capsule, Refills: 0      predniSONE (DELTASONE) 20 MG tablet Take 2 tablets by mouth daily for 3 days  Qty: 6 tablet, Refills: 0              Details   acetaminophen (TYLENOL) 500 MG tablet Take 500 mg by mouth every 6 hours as needed for Pain             Time Spent on discharge is   40 minutes in the examination, evaluation, counseling and review of medications and discharge plan. Signed:    Kristina Méndez MD   12/23/2021      Thank you KIMBERLY Grewal CNP for the opportunity to be involved in this patient's care. If you have any questions or concerns please feel free to contact me at [phone].

## 2021-12-23 NOTE — PROGRESS NOTES
Hospitalist Progress Note    Patient:  Levy Sick  Unit/Bed:0335/0335-01   YOB: 1960       MRN: [id number] Acct: [de-identified]  PCP: KIMBERLY Houser CNP    Date of Admission: 12/16/2021  --------------------------    Chief Complaint:     Shortness of breath           Hospital Course:     Jacob Cote is a 64 y.o. female hospitalized on 12/16/2021   recently diagnosed with COVID-19 pneumonia. Assessment/plan:     Acute hypoxic respiratory failure- improving       on 2-3  L, continue close monitor    COVID-19 pneumonia  Unvaccinated. Patient worried about vaccination as she reported significant arm swelling after whooping cough vaccine. Symptom onset ~ 12/5  Date of diagnosis/hospitalization 12/16/2021  currently requiring 3 L,  C-reactive protein improving  Continue close monitoring  Continue steroid therapy  Decadron 6 mg daily   Hemoptysis - pulmonology if persists     Glucose intolerance,  Blood sugar within acceptable level despite steroid therapy. Continue monitor    Obesity, body mass index 34    Hemoptysis - scanty x3  Monitor   H/H stable   May hold lovenox and pulm consult if persists         Code Status: Full Code         DVT prophylaxis: Lovenox     Disposition: Awaiting improvement of acute respiratory failure 1- 2 days    Discussed with patient  Discussed with RN      ----------------      Subjective:     Patient seen and examined  Shortness of breath  Improved   C/o hemoptysis  Blood staine sputum x 3 since yesterday angel   Diet: ADULT DIET; Regular    OBJECTIVE     Exam:  /81   Pulse 71   Temp 97.3 °F (36.3 °C) (Oral)   Resp 14   Ht 5' 5\" (1.651 m)   Wt 209 lb (94.8 kg)   LMP 02/27/2012   SpO2 96%   BMI 34.78 kg/m²        Gen: Not in distress. Alert. Head: Normocephalic. Atraumatic. Eyes: Conjunctivae/corneas clear. ENT: Oral mucosa moist  Neck: No JVD. No obvious thyromegaly.   CVS: Nml S1S2, no murmur  , RRR  Pulmomary diminished air entry  Gastrointestinal: Soft, non tender, non distend, . Musculoskeletal: No edema. Warm  Neuro: No focal deficit. Moves extremity spontaneously. Psychiatry: Appropriate affect. Not agitated. Medications:  Reviewed    Infusion Medications    sodium chloride       Scheduled Medications    sodium chloride flush  5-40 mL IntraVENous 2 times per day    enoxaparin  30 mg SubCUTAneous BID    pantoprazole  40 mg Oral QAM AC    dexamethasone  6 mg IntraVENous Q24H    Vitamin D  6,000 Units Oral Daily    Followed by   Trudi Otto ON 12/24/2021] Vitamin D  2,000 Units Oral Daily    ascorbic acid  500 mg Oral Daily    zinc sulfate  50 mg Oral Daily    budesonide-formoterol  2 puff Inhalation BID     PRN Meds: sodium chloride flush, sodium chloride, ondansetron **OR** ondansetron, polyethylene glycol, acetaminophen **OR** acetaminophen, guaiFENesin-codeine, albuterol sulfate HFA      Intake/Output Summary (Last 24 hours) at 12/23/2021 0851  Last data filed at 12/23/2021 0230  Gross per 24 hour   Intake 1580 ml   Output    Net 1580 ml             Labs:   Recent Labs     12/21/21  0540 12/22/21  0520 12/23/21  0710   WBC 8.0 7.4 7.1   HGB 13.5 13.0 13.1   HCT 40.7 39.3 38.8    318 302     Recent Labs     12/21/21  0540 12/22/21  0520 12/23/21  0710    137 138   K 4.8 4.9 4.7    103 102   CO2 25 24 24   BUN 22* 20 20   CREATININE 0.6 <0.5* <0.5*   CALCIUM 8.6 8.7 9.1   PHOS 2.7 2.8 3.4     Recent Labs     12/21/21  0540 12/22/21  0520 12/23/21  0710   AST 25 18 15   ALT 24 24 25   BILIDIR <0.2 <0.2 <0.2   BILITOT 0.4 0.3 0.3   ALKPHOS 76 64 60     No results for input(s): INR in the last 72 hours. No results for input(s): Ida Muñiz in the last 72 hours.     Urinalysis:      Lab Results   Component Value Date    NITRU Negative 12/17/2021    WBCUA 0-2 12/17/2021    BACTERIA 3+ 12/17/2021    RBCUA 3-4 12/17/2021    BLOODU Negative 12/17/2021    SPECGRAV 1.020 12/17/2021    GLUCOSEU Negative 12/17/2021       Radiology:  XR CHEST PORTABLE   Final Result   Multifocal airspace disease. Correlate with presentation. Follow-up to   resolution recommended.                      Electronically signed by Martin Gaytan MD on 12/23/2021 at 8:51 AM

## 2021-12-23 NOTE — PROGRESS NOTES
Pt A&O x4. VSS. Denies dyspnea and N/V. Patient is currently requiring oxygen at 2 L/min to maintain spO2 >90% Reports passing flatus. Assessment is as charted. Call light and bedside table within reach, wheels locked, bed in lowest position, Pt instructed to call out for assistance and expressed understanding, calls out appropriately.

## 2021-12-23 NOTE — DISCHARGE INSTR - COC
Continuity of Care Form    Patient Name: Stevo Suarez   :  1960  MRN:  [id number]    Admit date:  2021  Discharge date:  ***    Code Status Order: Full Code   Advance Directives:      Admitting Physician:  Radha Aguilera DO  PCP: KIMBERLY Grimaldo CNP    Discharging Nurse: [facility] Unit/Room#: 8289/1030-42  Discharging Unit Phone Number: ***    Emergency Contact:   Extended Emergency Contact Information  Primary Emergency Contact: Alberto Gilbert  [facility] [place] [address] Phone: [phone]  Relation: Spouse  Secondary Emergency Contact: Marycarmen Taveras   [address] Phone: [phone]  Relation: Brother/Sister    Past Surgical History:  Past Surgical History:   Procedure Laterality Date     SECTION      LEEP         Immunization History:   Immunization History   Administered Date(s) Administered    Tdap (Boostrix, Adacel) 06/15/2011       Active Problems:  Patient Active Problem List   Diagnosis Code    Tear of medial cartilage or meniscus of knee, current [id number]    Knee pain M25.569    Localized osteoarthrosis, lower leg M17.10    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 9) E66.9       Isolation/Infection:   Isolation            Droplet Plus  Droplet Plus          Patient Infection Status       Infection Onset Added Last Indicated Last Indicated By Review Planned Expiration Resolved Resolved By    COVID-19 21 COVID-19, Rapid 21      Resolved    COVID-19 (Rule Out) 21 COVID-19, Rapid (Ordered)   21 Rule-Out Test Resulted            Nurse Assessment:  Last Vital Signs: /74 Comment: 122/74  Pulse 88 Temp 98.5 °F (36.9 °C) (Oral)   Resp 16   Ht 5' 5\" (1.651 m)   Wt 209 lb (94.8 kg)   LMP 02/27/2012   SpO2 91%   BMI 34.78 kg/m²     Last documented pain score (0-10 scale): Pain Level: 0  Last Weight:   Wt Readings from Last 1 Encounters:   12/16/21 209 lb (94.8 kg)     Mental Status:  oriented and alert    IV Access:  - None    Nursing Mobility/ADLs:  Walking   Independent  Transfer  Independent  Bathing  Independent  Dressing  Independent  Toileting  Independent  Feeding  [address]  Independent  Med Delivery   whole    Wound Care Documentation and Therapy:        Elimination:  Continence: Bowel: Yes  Bladder: Yes  Urinary Catheter: None   Colostomy/Ileostomy/Ileal Conduit: ***       Date of Last BM: ***    Intake/Output Summary (Last 24 hours) at 12/23/2021 1742  Last data filed at 12/23/2021 1341  Gross per 24 hour   Intake 1460 ml   Output --   Net 1460 ml     I/O last 3 completed shifts: In: 1580 [P.O.:1580]  Out: -     Safety Concerns:     None    Impairments/Disabilities:      None    Nutrition Therapy:  Current Nutrition Therapy:   - Oral Diet:  General    Routes of Feeding: Oral  Liquids: No Restrictions  Daily Fluid Restriction: no  Last Modified Barium Swallow with Video (Video Swallowing Test): not done    Treatments at the Time of Hospital Discharge:   Respiratory Treatments: ***  Oxygen Therapy:  is on oxygen at 2 L/min per nasal cannula.   Ventilator:    - No ventilator support    Rehab Therapies: ***  Weight Bearing Status/Restrictions: No weight bearing restirctions  Other Medical Equipment (for information only, NOT a DME order):  ***  Other Treatments: ***    Patient's personal belongings (please select all that are sent with patient):  None    RN SIGNATURE:  Electronically signed by Ulysses Miners, RN on 12/23/21 at 5:45 PM EST    CASE MANAGEMENT/SOCIAL WORK SECTION    Inpatient Status Date: ***    Readmission Risk Assessment Score:  Readmission Risk              Risk of Unplanned Readmission:  10           Discharging to Facility/ Agency   Name:   Address:  Phone:  Fax:    Dialysis Facility (if applicable)   Name:  Address:  Dialysis Schedule:  Phone:  Fax:    / signature: {Esignature:[id number]}    PHYSICIAN SECTION    Prognosis: {Prognosis:2008436859}    Condition at Discharge: Сергей Chilel Patient Condition:303291803}    Rehab Potential (if transferring to Rehab): {Prognosis:2805915911}    Recommended Labs or Other Treatments After Discharge: ***    Physician Certification: I certify the above information and transfer of Shruthi Medina  is necessary for the continuing treatment of the diagnosis listed and that she requires {Admit to Appropriate Level of Care:86583} for {GREATER/LESS:070299199} 30 days.      Update Admission H&P: {CHP DME Changes in LKUTV:231664948}    PHYSICIAN SIGNATURE:  {Esignature:[id number]}

## 2021-12-23 NOTE — CARE COORDINATION
CASE MANAGEMENT DISCHARGE SUMMARY      Discharge to: home      New Durable Medical Equipment ordered/agency: O2 provided by [employer]    Transportation: private       Confirmed discharge plan with:     Patient: yes     Family:  Yes per patient        RN, name: Ernie Bergman RN

## 2021-12-23 NOTE — PROGRESS NOTES
Oxygen documentation:    1. O2 saturation at REST on ROOM AIR = ___92___%    If saturation is 89% or above please proceed with steps 2 and 3. 2. O2 saturation with AMBULATION of __30___ feet on ROOM AIR = __86___%  3.  O2 saturation with AMBULATION on ___2____ liter/min = ___90___%    DCP notified: ______

## 2021-12-23 NOTE — PLAN OF CARE
Problem: Nutrition  Goal: Optimal nutrition therapy  Outcome: Ongoing  Note: Nutrition Problem #1: Inadequate oral intake  Intervention: Food and/or Nutrient Delivery: Continue Current Diet  Nutritional Goals: Pt will consume greater than 50% of meals this admission

## 2021-12-23 NOTE — PROGRESS NOTES
Comprehensive Nutrition Assessment    Type and Reason for Visit:  RD Nutrition Re-Screen/LOS,Initial    Nutrition Recommendations/Plan:   1. Continue regular diet  2. Encourage PO intakes  3. Monitor nutrition adequacy, pertinent labs, bowel habits, wt changes, and clinical progress    Nutrition Assessment:  Pt admitted with cough and hypoxia 2/2 COVID19. Pt in droplet plus precautions, spoke w/ pt via phone. Pt reports poor appetite/intakes for a few days after onset of sx PTA. States appetite has returned to normal, PO intakes mostly % at recent meals. Pt reports UBW is 210# with no recent weight changes. Pt denies any nutritional needs or questions at this time. Will continue to monitor. Malnutrition Assessment:  Malnutrition Status:  No malnutrition      Estimated Daily Nutrient Needs:  Energy (kcal):  1272-4849 kcals/day; Weight Used for Energy Requirements:  Ideal (57kg)     Protein (g):  57-68 g/day; Weight Used for Protein Requirements:  Ideal (1-1.2)        Fluid (ml/day):  1 ml/kcal      Nutrition Related Findings:  2L/min NC. +BM 12/22. Wounds:  None       Current Nutrition Therapies:    ADULT DIET; Regular    Anthropometric Measures:  · Height: 5' 5\" (165.1 cm)  · Current Body Weight: 209 lb (94.8 kg)     · Ideal Body Weight: 125 lbs; % Ideal Body Weight 167.2 %   · BMI: 34.8  · BMI Categories: Obese Class 1 (BMI 30.0-34. 9)       Nutrition Diagnosis:   · Inadequate oral intake related to early satiety as evidenced by poor intake prior to admission    Nutrition Interventions:   Food and/or Nutrient Delivery:  Continue Current Diet  Nutrition Education/Counseling:  No recommendation at this time   Coordination of Nutrition Care:  Continue to monitor while inpatient    Goals:  Pt will consume greater than 50% of meals this admission       Nutrition Monitoring and Evaluation:   Behavioral-Environmental Outcomes:  None Identified   Food/Nutrient Intake Outcomes:  Food and Nutrient Intake  Physical Signs/Symptoms Outcomes:  Biochemical Data,Skin     Discharge Planning:    Continue current diet     Electronically signed by Edvin Mustafa RD on 12/23/21 at 9:45 AM EST    Contact: [phone]

## 2021-12-26 ENCOUNTER — CARE COORDINATION (OUTPATIENT)
Dept: CASE MANAGEMENT | Age: 61
End: 2021-12-26

## 2021-12-26 NOTE — CARE COORDINATION
Date/Time:  12/26/2021 9:20 AM  Attempted to reach patient by telephone. Call within 2 business days of discharge: Yes Left HIPPA compliant message requesting a return call. Will attempt to reach patient again.

## 2021-12-27 ENCOUNTER — TELEPHONE (OUTPATIENT)
Dept: FAMILY MEDICINE CLINIC | Age: 61
End: 2021-12-27

## 2021-12-27 ENCOUNTER — CARE COORDINATION (OUTPATIENT)
Dept: CASE MANAGEMENT | Age: 61
End: 2021-12-27

## 2021-12-27 NOTE — CARE COORDINATION
[facility] Transitions Initial Follow Up Call    Call within 2 business days of discharge: Yes    Patient: Rachel Corrigan Patient : 1960   MRN: [id number]  Reason for Admission: covid  Discharge Date: 21 RARS: Readmission Risk Score: 7.7 ( )      Last Discharge [facility]       Complaint Diagnosis Description Type Department Provider    21 Concern For COVID-19; Abdominal Pain Acute respiratory failure with hypoxia ([address] . .. ED to Hosp-Admission (Discharged) (ADMITTED) Reg Stone MD; Ifrah Choudhary. .. Second and final attempt made to reach patient for initial post hospital transition call. VM left stating purpose of call along with my contact information requesting a return call. Episode closed due to unsuccessful contact  Calderon Gilman RN   Care Transition Nurse  [phone]    Care Transitions 24 Hour Call    Care Transitions Interventions         Follow Up  No future appointments.     Calderon Gilman RN

## 2021-12-27 NOTE — TELEPHONE ENCOUNTER
----- Message from Anton Loweach sent at 12/27/2021  3:24 PM EST -----  Subject: Hospital Follow Up    QUESTIONS  What hospital was the Patient Discharged from? THE [facility]  Date of Discharge? 2021-12-23  Discharge Location? Home  Reason for hospitalization as patient stated? oxygen level was in the   80's. Pt has covid pneumonia  What question does the patient have, if applicable? Pt stated she is now   experiencing back pain and also needs a hospital followup.  ---------------------------------------------------------------------------  --------------  Petsy  What is the best way for the office to contact you? OK to leave message on   voicemail  Preferred Call Back Phone Number? [phone]  ---------------------------------------------------------------------------  --------------  SCRIPT ANSWERS  Relationship to Patient? Self  (Patient requests to see provider urgently. )? No  (Has the patient been discharged from the hospital within 2 business days   AND does not have a Telephone Encounter  Follow Up From [address] [place] [address]   documented in [address]?)? No  Do you have any questions for your primary care provider that need to be   answered prior to your appointment? (Use RN Triage if question pertains to   anything on the red flag list)?  Yes

## 2021-12-29 ENCOUNTER — VIRTUAL VISIT (OUTPATIENT)
Dept: FAMILY MEDICINE CLINIC | Age: 61
End: 2021-12-29
Payer: MEDICAID

## 2021-12-29 DIAGNOSIS — U07.1 PNEUMONIA DUE TO COVID-19 VIRUS: Primary | ICD-10-CM

## 2021-12-29 DIAGNOSIS — J12.82 PNEUMONIA DUE TO COVID-19 VIRUS: Primary | ICD-10-CM

## 2021-12-29 PROCEDURE — 99214 OFFICE O/P EST MOD 30 MIN: CPT | Performed by: NURSE PRACTITIONER

## 2021-12-29 PROCEDURE — G8417 CALC BMI ABV UP PARAM F/U: HCPCS | Performed by: NURSE PRACTITIONER

## 2021-12-29 PROCEDURE — 1111F DSCHRG MED/CURRENT MED MERGE: CPT | Performed by: NURSE PRACTITIONER

## 2021-12-29 PROCEDURE — G8427 DOCREV CUR MEDS BY ELIG CLIN: HCPCS | Performed by: NURSE PRACTITIONER

## 2021-12-29 PROCEDURE — 1036F TOBACCO NON-USER: CPT | Performed by: NURSE PRACTITIONER

## 2021-12-29 PROCEDURE — G8484 FLU IMMUNIZE NO ADMIN: HCPCS | Performed by: NURSE PRACTITIONER

## 2021-12-29 PROCEDURE — 3017F COLORECTAL CA SCREEN DOC REV: CPT | Performed by: NURSE PRACTITIONER

## 2021-12-29 ASSESSMENT — ENCOUNTER SYMPTOMS
GASTROINTESTINAL NEGATIVE: 1
COUGH: 1
SHORTNESS OF BREATH: 1
BACK PAIN: 1

## 2021-12-29 NOTE — PROGRESS NOTES
2021    TELEHEALTH EVALUATION -- Audio/Visual (During [facility]-19 public health emergency)    HPI:    Todd Reynoso (:  1960) has requested an audio/video evaluation for the following concern(s):    Patient presents today as a new patient to establish care and to review her hospital stay. She was admitted with Covid pneumonia and is currently on 2 L of oxygen at home. She was not on oxygen prior to her hospital stay. She is today having some pain in her lower back on the right side and she states that is where the pneumonia was. She has completed her steroids. She states since completing the steroids is when she has noticed more pain in her back. She is wondering what medication she can take over-the-counter to help with that. BP- 125/77  P -88  She does report the albuterol inhaler does make her feel little anxious. Review of Systems   Constitutional: Positive for fatigue. HENT: Negative. Respiratory: Positive for cough and shortness of breath. Cardiovascular: Negative. Gastrointestinal: Negative. Musculoskeletal: Positive for back pain. Skin: Negative. Neurological: Negative for dizziness and headaches. Psychiatric/Behavioral: Negative. Prior to Visit Medications    Medication Sig Taking?  Authorizing Provider   albuterol sulfate  (90 Base) MCG/ACT inhaler Inhale 2 puffs into the lungs every 4 hours as needed for Wheezing  Beka Tomas MD   pantoprazole (PROTONIX) 40 MG tablet Take 1 tablet by mouth every morning (before breakfast)  Beka Tomas MD   ascorbic acid (VITAMIN C) 500 MG tablet Take 1 tablet by mouth daily  Beka Tomas MD   Vitamin D (CHOLECALCIFEROL) 50 MCG (2000 UT) TABS tablet Take 1 tablet by mouth daily  Beka Tomas MD   zinc sulfate (ZINCATE) 220 (50 Zn) MG capsule Take 1 capsule by mouth daily  Beka Tomas MD   acetaminophen (TYLENOL) 500 MG tablet Take 500 mg by mouth every 6 hours as needed for Pain  Historical Provider, MD       Social History     Tobacco Use    Smoking status: Never Smoker    Smokeless tobacco: Never Used   Vaping Use    Vaping Use: Never used   Substance Use Topics    Alcohol use: Yes     Comment: occasional    Drug use: No            PHYSICAL EXAMINATION:  [ INSTRUCTIONS:  \"[x]\" Indicates a positive item  \"[]\" Indicates a negative item  -- DELETE ALL ITEMS NOT EXAMINED]  Vital Signs: (As obtained by patient/caregiver or practitioner observation)    Blood pressure-  Heart rate-    Respiratory rate-    Temperature-  Pulse oximetry-     Constitutional: [x] Appears well-developed and well-nourished [x] No apparent distress      [] Abnormal-   Mental status  [x] Alert and awake  [x] Oriented to person/place/time []Able to follow commands      Eyes:  EOM    []  Normal  [] Abnormal-  Sclera  []  Normal  [] Abnormal -         Discharge []  None visible  [] Abnormal -    HENT:   [] Normocephalic, atraumatic. [] Abnormal   [] Mouth/Throat: Mucous membranes are moist.     External Ears [] Normal  [] Abnormal-     Neck: [] No visualized mass     Pulmonary/Chest: [x] Respiratory effort normal.  [] No visualized signs of difficulty breathing or respiratory distress        [] Abnormal-      Musculoskeletal:   [] Normal gait with no signs of ataxia         [x] Normal range of motion of neck        [] Abnormal-       Neurological:        [] No Facial Asymmetry (Cranial nerve 7 motor function) (limited exam to video visit)          [] No gaze palsy        [] Abnormal-         Skin:        [x] No significant exanthematous lesions or discoloration noted on facial skin         [] Abnormal-            Psychiatric:       [] Normal Affect [] No Hallucinations        [] Abnormal-     Other pertinent observable physical exam findings-     ASSESSMENT/PLAN:  1. Pneumonia due to COVID-19 virus  Continue with supplemental oxygen for now. We will continue with the inhaler for the next week and then just use as needed. Follow-up in 1 month for a wellness visit in office and patient will let me know if she needs anything before then. Return in about 1 month (around 1/29/2022). Arnold Faria is a 64 y.o. female being evaluated by a Virtual Visit (video visit) encounter to address concerns as mentioned above. A caregiver was present when appropriate. Due to this being a TeleHealth encounter (During [facility]-30 public health emergency), evaluation of the following organ systems was limited: Vitals/Constitutional/EENT/Resp/CV/GI//MS/Neuro/Skin/Heme-Lymph-Imm. Pursuant to the emergency declaration under the [address] [place] [address] authority and the [employer] Act, this Virtual Visit was conducted with patient's (and/or legal guardian's) consent, to reduce the patient's risk of exposure to COVID-19 and provide necessary medical care. The patient (and/or legal guardian) has also been advised to contact this office for worsening conditions or problems, and seek emergency medical treatment and/or call 911 if deemed necessary. Services were provided through a video synchronous discussion virtually to substitute for in-person clinic visit. Patient and provider were located at their individual homes. --KIMBERLY Rivera CNP on 12/29/2021 at 3:54 PM    An electronic signature was used to authenticate this note.

## 2022-01-17 ENCOUNTER — OFFICE VISIT (OUTPATIENT)
Dept: FAMILY MEDICINE CLINIC | Age: 62
End: 2022-01-17
Payer: MEDICAID

## 2022-01-17 VITALS
HEIGHT: 65 IN | OXYGEN SATURATION: 96 % | DIASTOLIC BLOOD PRESSURE: 76 MMHG | BODY MASS INDEX: 34.55 KG/M2 | WEIGHT: 207.4 LBS | SYSTOLIC BLOOD PRESSURE: 138 MMHG | HEART RATE: 99 BPM

## 2022-01-17 DIAGNOSIS — Z99.81 SUPPLEMENTAL OXYGEN DEPENDENT: ICD-10-CM

## 2022-01-17 DIAGNOSIS — J12.82 PNEUMONIA DUE TO COVID-19 VIRUS: ICD-10-CM

## 2022-01-17 DIAGNOSIS — E11.9 TYPE 2 DIABETES MELLITUS WITHOUT COMPLICATION, WITHOUT LONG-TERM CURRENT USE OF INSULIN (HCC): ICD-10-CM

## 2022-01-17 DIAGNOSIS — U07.1 PNEUMONIA DUE TO COVID-19 VIRUS: ICD-10-CM

## 2022-01-17 DIAGNOSIS — U09.9 POST-COVID-19 CONDITION: Primary | ICD-10-CM

## 2022-01-17 PROCEDURE — 3046F HEMOGLOBIN A1C LEVEL >9.0%: CPT | Performed by: NURSE PRACTITIONER

## 2022-01-17 PROCEDURE — 3017F COLORECTAL CA SCREEN DOC REV: CPT | Performed by: NURSE PRACTITIONER

## 2022-01-17 PROCEDURE — 99213 OFFICE O/P EST LOW 20 MIN: CPT | Performed by: NURSE PRACTITIONER

## 2022-01-17 PROCEDURE — 2022F DILAT RTA XM EVC RTNOPTHY: CPT | Performed by: NURSE PRACTITIONER

## 2022-01-17 PROCEDURE — 1111F DSCHRG MED/CURRENT MED MERGE: CPT | Performed by: NURSE PRACTITIONER

## 2022-01-17 PROCEDURE — G8417 CALC BMI ABV UP PARAM F/U: HCPCS | Performed by: NURSE PRACTITIONER

## 2022-01-17 PROCEDURE — G8427 DOCREV CUR MEDS BY ELIG CLIN: HCPCS | Performed by: NURSE PRACTITIONER

## 2022-01-17 PROCEDURE — 1036F TOBACCO NON-USER: CPT | Performed by: NURSE PRACTITIONER

## 2022-01-17 PROCEDURE — G8484 FLU IMMUNIZE NO ADMIN: HCPCS | Performed by: NURSE PRACTITIONER

## 2022-01-17 RX ORDER — LANCETS 30 GAUGE
EACH MISCELLANEOUS
Qty: 100 EACH | Refills: 2 | Status: SHIPPED | OUTPATIENT
Start: 2022-01-17 | End: 2022-05-09 | Stop reason: ALTCHOICE

## 2022-01-17 RX ORDER — GLUCOSAMINE HCL/CHONDROITIN SU 500-400 MG
CAPSULE ORAL
Qty: 100 STRIP | Refills: 2 | Status: SHIPPED | OUTPATIENT
Start: 2022-01-17 | End: 2022-05-09 | Stop reason: ALTCHOICE

## 2022-01-17 ASSESSMENT — ENCOUNTER SYMPTOMS
SHORTNESS OF BREATH: 1
GASTROINTESTINAL NEGATIVE: 1
COUGH: 0
WHEEZING: 0

## 2022-01-17 ASSESSMENT — PATIENT HEALTH QUESTIONNAIRE - PHQ9
SUM OF ALL RESPONSES TO PHQ QUESTIONS 1-9: 0
SUM OF ALL RESPONSES TO PHQ9 QUESTIONS 1 & 2: 0
SUM OF ALL RESPONSES TO PHQ QUESTIONS 1-9: 0
2. FEELING DOWN, DEPRESSED OR HOPELESS: 0
SUM OF ALL RESPONSES TO PHQ QUESTIONS 1-9: 0
SUM OF ALL RESPONSES TO PHQ QUESTIONS 1-9: 0
1. LITTLE INTEREST OR PLEASURE IN DOING THINGS: 0

## 2022-01-17 NOTE — PROGRESS NOTES
Patient: Sarah Sawyer is a 58 y.o. female who presents today with the following Chief Complaint(s):  Chief Complaint   Patient presents with    Follow-up     Post-COVID/pneumonia       Assessment:  Encounter Diagnoses   Name Primary? 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 Post-COVID-19 condition  Overall improving, needs to continue with incentive spirometer. 2. Pneumonia due to COVID-19 virus  Will recheck Ct scan of chest due to continued need for oxygen.   - CT CHEST WO CONTRAST; Future    3. Supplemental oxygen dependent  - CT CHEST WO CONTRAST; Future    4. Type 2 diabetes mellitus without complication, without long-term current use of insulin ([facility])  Newly diagnosed. Will try diet control and recheck in 3 months. - Lancets MISC; To test glucose daily  Dispense: 100 each; Refill: 2  - blood glucose monitor strips; Test daily & as needed for symptoms of irregular blood glucose. Dispense: 100 strip; Refill: 2  - blood glucose monitor kit and supplies; Test daily & as needed for symptoms of irregular blood glucose. Dispense: 1 kit; Refill: 0      HPI   Patient presents today for follow-up. Blood pressure is stable today. She is still currently needing 2 L of oxygen daily. She states she has tried to take off the oxygen at home however with any exertion her oxygen drops to the 80s. She states at rest it will not go less than 90 without oxygen. She denies any history of smoking or any history of asthma. She has been using her incentive spirometer and states she is slowly increasing that level. She was diagnosed with COVID-pneumonia on December 16. Overall she is slowly feeling better but still having difficulty with shortness of breath and low oxygen levels.     She had an A1c level done while in the hospital and it was 6.5.  I did advise patient that this means that she has diabetes. Patient would like to avoid medications at this time and try to control with diet. Current Outpatient Medications   Medication Sig Dispense Refill    Lancets MISC To test glucose daily 100 each 2    blood glucose monitor strips Test daily & as needed for symptoms of irregular blood glucose. 100 strip 2    blood glucose monitor kit and supplies Test daily & as needed for symptoms of irregular blood glucose. 1 kit 0    ascorbic acid (VITAMIN C) 500 MG tablet Take 1 tablet by mouth daily 30 tablet 0    Vitamin D (CHOLECALCIFEROL) 50 MCG (2000 UT) TABS tablet Take 1 tablet by mouth daily 60 tablet 0    zinc sulfate (ZINCATE) 220 (50 Zn) MG capsule Take 1 capsule by mouth daily 30 capsule 0    acetaminophen (TYLENOL) 500 MG tablet Take 500 mg by mouth every 6 hours as needed for Pain      albuterol sulfate  (90 Base) MCG/ACT inhaler Inhale 2 puffs into the lungs every 4 hours as needed for Wheezing (Patient not taking: Reported on 1/17/2022) 18 g 3    pantoprazole (PROTONIX) 40 MG tablet Take 1 tablet by mouth every morning (before breakfast) (Patient not taking: Reported on 1/17/2022) 30 tablet 0     No current facility-administered medications for this visit. Patient's past medical history, surgical history, family history, medications,and allergies  were all reviewed and updated as appropriate today. Review of Systems   Constitutional: Negative. HENT: Negative. Respiratory: Positive for shortness of breath. Negative for cough and wheezing. Cardiovascular: Negative. Gastrointestinal: Negative. Musculoskeletal: Negative. Skin: Negative. Neurological: Negative. Negative for dizziness and headaches. Psychiatric/Behavioral: Negative. Physical Exam  Vitals reviewed. Cardiovascular:      Rate and Rhythm: Normal rate and regular rhythm. Heart sounds: Normal heart sounds.    Pulmonary: Effort: Pulmonary effort is normal.      Breath sounds: Normal breath sounds. Decreased air movement present. Skin:     General: Skin is warm and dry. Neurological:      Mental Status: She is alert. Vitals:    01/17/22 1114   BP: 138/76   Pulse: 99   SpO2: 96%       This chart was generated using the Dragon dictation system. I created this record but it may contain dictation errors due to the limitation of the software.

## 2022-01-25 ENCOUNTER — HOSPITAL ENCOUNTER (OUTPATIENT)
Dept: CT IMAGING | Age: 62
Discharge: HOME | End: 2022-01-25
Payer: MEDICAID

## 2022-01-25 DIAGNOSIS — Z99.81 SUPPLEMENTAL OXYGEN DEPENDENT: ICD-10-CM

## 2022-01-25 DIAGNOSIS — J12.82 PNEUMONIA DUE TO COVID-19 VIRUS: ICD-10-CM

## 2022-01-25 DIAGNOSIS — U07.1 PNEUMONIA DUE TO COVID-19 VIRUS: ICD-10-CM

## 2022-01-25 PROCEDURE — 71250 CT THORAX DX C-: CPT

## 2022-01-27 ENCOUNTER — TELEPHONE (OUTPATIENT)
Dept: PULMONOLOGY | Age: 62
End: 2022-01-27

## 2022-01-27 ENCOUNTER — TELEPHONE (OUTPATIENT)
Dept: FAMILY MEDICINE CLINIC | Age: 62
End: 2022-01-27

## 2022-01-27 DIAGNOSIS — U07.1 PNEUMONIA DUE TO COVID-19 VIRUS: ICD-10-CM

## 2022-01-27 DIAGNOSIS — J12.82 PNEUMONIA DUE TO COVID-19 VIRUS: ICD-10-CM

## 2022-01-27 DIAGNOSIS — R91.1 PULMONARY NODULE: Primary | ICD-10-CM

## 2022-01-27 NOTE — TELEPHONE ENCOUNTER
On the CT scan the groundglass opacities it is from her Covid pneumonia. The mediastinal adenopathy is also likely reactive from pneumonia. There was a 2.6 cm pulmonary nodule noted. I would like this further evaluated with pulmonology. I will place a referral please give her the phone number to call to schedule.     [id number] [facility] Sravan Etorbidea MD Mera  [address] [place] [address]  [phone]

## 2022-01-27 NOTE — TELEPHONE ENCOUNTER
Pt wants to know if she needs an inhaler with a steroid in it to help with the pneumonia while she's waiting to see pulm.

## 2022-01-31 ENCOUNTER — TELEPHONE (OUTPATIENT)
Dept: FAMILY MEDICINE CLINIC | Age: 62
End: 2022-01-31

## 2022-01-31 ENCOUNTER — NURSE TRIAGE (OUTPATIENT)
Dept: OTHER | Facility: CLINIC | Age: 62
End: 2022-01-31

## 2022-01-31 RX ORDER — LEVOFLOXACIN 500 MG/1
500 TABLET, FILM COATED ORAL DAILY
Qty: 10 TABLET | Refills: 0 | Status: SHIPPED
Start: 2022-01-31 | End: 2022-02-01 | Stop reason: SINTOL

## 2022-01-31 RX ORDER — LEVOFLOXACIN 500 MG/1
500 TABLET, FILM COATED ORAL DAILY
Qty: 10 TABLET | Refills: 0 | Status: SHIPPED | OUTPATIENT
Start: 2022-01-31 | End: 2022-01-31 | Stop reason: SDUPTHER

## 2022-01-31 NOTE — TELEPHONE ENCOUNTER
Received call from [address] [place] [address] at [facility] with Red Flag Complaint. Subjective: Caller states \"continued breathing difficulty since having Covid in December. She says she is very sensitive to the Advair inhaler. Inhaler makes her too jittery to take it. \"     Current Symptoms: SOB, coughing spells, 2L of O2     Onset: 2 months ago; gradual, worsening    Associated Symptoms: reduced activity    Pain Severity: 0/10; N/A; none    Temperature: no n/a    What has been tried: Hospitalized, steroids, antibioticsOxygen     LMP: NA Pregnant: NA    Recommended disposition: Go to Office now. Care advice provided, patient verbalizes understanding; denies any other questions or concerns; instructed to call back for any new or worsening symptoms. Writer provided warm transfer to Terence Worley at [facility] for appointment scheduling     Attention Provider: Thank you for allowing me to participate in the care of your patient. The patient was connected to triage in response to information provided to the ECC/PSC. Please do not respond through this encounter as the response is not directed to a shared pool.             Reason for Disposition   MILD difficulty breathing (e.g., minimal/no SOB at rest, SOB with walking, pulse < 100) of new-onset or worse than normal    Protocols used: BREATHING DIFFICULTY-ADULT-OH

## 2022-01-31 NOTE — TELEPHONE ENCOUNTER
----- Message from Anya Griffin sent at 1/31/2022 10:53 AM EST -----  Subject: Appointment Request    Reason for Call: Immediate Return from RN Triage    QUESTIONS  Type of Appointment? Established Patient  Reason for appointment request? No appointments available during search  Additional Information for Provider? Patient is a return from [facility] but did   not want me to transfer her to office for appt. She said she would wait.   ---------------------------------------------------------------------------  --------------  CALL BACK INFO  What is the best way for the office to contact you? OK to leave message on   voicemail  Preferred Call Back Phone Number?  [phone]  ---------------------------------------------------------------------------  --------------  SCRIPT ANSWERS

## 2022-01-31 NOTE — TELEPHONE ENCOUNTER
Pt notified. Med needs to go to [facility] so I just resent it. You will see the cosign.

## 2022-02-01 ENCOUNTER — TELEPHONE (OUTPATIENT)
Dept: FAMILY MEDICINE CLINIC | Age: 62
End: 2022-02-01

## 2022-02-01 RX ORDER — DOXYCYCLINE HYCLATE 100 MG
100 TABLET ORAL 2 TIMES DAILY
Qty: 20 TABLET | Refills: 0 | Status: SHIPPED | OUTPATIENT
Start: 2022-02-01 | End: 2022-02-11

## 2022-02-01 NOTE — TELEPHONE ENCOUNTER
-Possible Allergic Reaction to Antibiotic Today 2/1/22.  -Symptoms are: Diarrhea, Itchy Skin, Nausea, Frequent Urination.  -Pt states she took medication at 1:30 after lunch & has been drinking lots of water.  -Pt wants to know if PCP wants her to continue medication tomorrow?

## 2022-02-01 NOTE — TELEPHONE ENCOUNTER
Patient stop the Levaquin and I sent in doxycycline instead. I know it list that she typically has sensitivities to a lot of medications. Any antibiotic could cause the diarrhea so she should take a probiotic will be eating yogurt daily with that. I would go ahead and take a Benadryl due to the reaction she had with itchy skin with the Levaquin.

## 2022-02-15 ENCOUNTER — OFFICE VISIT (OUTPATIENT)
Dept: PULMONOLOGY | Age: 62
End: 2022-02-15
Payer: MEDICAID

## 2022-02-15 VITALS
TEMPERATURE: 98.1 F | SYSTOLIC BLOOD PRESSURE: 139 MMHG | RESPIRATION RATE: 16 BRPM | OXYGEN SATURATION: 95 % | HEART RATE: 89 BPM | WEIGHT: 211.6 LBS | HEIGHT: 65 IN | BODY MASS INDEX: 35.25 KG/M2 | DIASTOLIC BLOOD PRESSURE: 78 MMHG

## 2022-02-15 DIAGNOSIS — R09.02 HYPOXEMIA: ICD-10-CM

## 2022-02-15 DIAGNOSIS — R59.0 MEDIASTINAL ADENOPATHY: ICD-10-CM

## 2022-02-15 DIAGNOSIS — J18.9 PNEUMONIA WITH CAVITY OF LUNG: ICD-10-CM

## 2022-02-15 DIAGNOSIS — E66.9 OBESITY (BMI 30-39.9): Primary | ICD-10-CM

## 2022-02-15 DIAGNOSIS — R91.8 PULMONARY INFILTRATES: ICD-10-CM

## 2022-02-15 DIAGNOSIS — Z86.16 PERSONAL HISTORY OF COVID-19: ICD-10-CM

## 2022-02-15 DIAGNOSIS — J98.4 PNEUMONIA WITH CAVITY OF LUNG: ICD-10-CM

## 2022-02-15 PROCEDURE — G8417 CALC BMI ABV UP PARAM F/U: HCPCS | Performed by: INTERNAL MEDICINE

## 2022-02-15 PROCEDURE — 99203 OFFICE O/P NEW LOW 30 MIN: CPT | Performed by: INTERNAL MEDICINE

## 2022-02-15 PROCEDURE — G8484 FLU IMMUNIZE NO ADMIN: HCPCS | Performed by: INTERNAL MEDICINE

## 2022-02-15 PROCEDURE — G8427 DOCREV CUR MEDS BY ELIG CLIN: HCPCS | Performed by: INTERNAL MEDICINE

## 2022-02-15 NOTE — PROGRESS NOTES
MA Communication:   The following orders are received by verbal communication from Kate Belcher MD    Orders include:    CT/6MW   3 mon f/u

## 2022-02-15 NOTE — PATIENT INSTRUCTIONS
Remember to bring a list of pulmonary medications and any CPAP or BiPAP machines to your next appointment with the office. Please keep all of your future appointments scheduled by [facility] office. Out of respect for other patients and providers, you may be asked to reschedule your appointment if you arrive later than your scheduled appointment time. Appointments cancelled less than 24hrs in advance will be considered a no show. Patients with three missed appointments within 1 year or four missed appointments within 2 years can be dismissed from the practice. Please be aware that our physicians are required to work in the Intensive Care Unit at [facility].  Your appointment may need to be rescheduled if they are designated to work during your appointment time. You may receive a survey regarding the care you received during your visit. Your input is valuable to us. We encourage you to complete and return your survey. We hope you will choose us in the future for your healthcare needs. Pt instructed of all future appointment dates & times, including radiology, labs, procedures & referrals. If procedures were scheduled preparation instructions provided. Instructions on future appointments with [facility] were given.

## 2022-02-16 PROBLEM — R59.0 MEDIASTINAL ADENOPATHY: Status: ACTIVE | Noted: 2022-02-16

## 2022-02-16 PROBLEM — Z86.16 PERSONAL HISTORY OF COVID-19: Status: ACTIVE | Noted: 2022-02-16

## 2022-02-16 PROBLEM — R91.8 PULMONARY INFILTRATES: Status: ACTIVE | Noted: 2022-02-16

## 2022-02-16 NOTE — PROGRESS NOTES
Chief Complaint/Referring Provider:  Patient is being seen at the request of Sherri GANT  for a consultation for lung nodule       Presenting HPI: Patient is a 79-year-old female who has been referred to the office for a pulmonary evaluation for abnormal CT of the chest    Patient states that she was hospitalized for 7 days in middle of December because of pneumonia and then was told that she also had positive Covid at that time, patient states that after recuperation she was discharged home and patient was sent home on oxygen, patient states that she does have some shortness of breath when she walks around for long time, patient also has been having some back and front chest pain at times along with occasional palpitations at nighttime, patient does not have any fever no chills, patient does not have any increasing sinus congestion of concern, no epistaxis or hemoptysis, patient does not have any significant abdominal discomfort nausea vomiting or reflux symptoms, patient does not have any significant change in the bowel movements, no hematochezia or melena, patient does not have any increasing swelling of the lower extremities, patient's weight is maintained, patient does snore at nighttime along with that patient also states naps in the daytime, patient has multiple pets in the form of dog birds and fish along with that patient has a gas based heating system without any humidification, 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 to any flour, patient states that when she climbs the stairs her saturation drops from 95 to 82%, patient does not have any confusion lethargy, patient has not been a smoker, patient does not have any change in the ambient environment, no other pertinent review of system of concern    Past Medical History:   Diagnosis Date    Cancer [facility])     cervical       Past Surgical History:   Procedure Laterality Date     SECTION      LEEP         Allergies Allergen Reactions    Other      Sensitive to all medications - everything causes reaction       Medication list was reviewed and updated as needed in Epic    Social History     Socioeconomic History    Marital status:      Spouse name: Not on file    Number of children: Not on file    Years of education: Not on file    Highest education level: Not on file   Occupational History    Not on file   Tobacco Use    Smoking status: Never Smoker    Smokeless tobacco: Never Used   Vaping Use    Vaping Use: Never used   Substance and Sexual Activity    Alcohol use: Yes     Comment: occasional    Drug use: No    Sexual activity: Yes   Other Topics Concern    Not on file   Social History Narrative    Not on file     Social Determinants of Health     Financial Resource Strain:     Difficulty of Paying Living Expenses: Not on file   Food Insecurity:     Worried About Running Out of Food in the Last Year: Not on file    [place] of Food in the Last Year: Not on file   Transportation Needs:     Lack of Transportation (Medical): Not on file    Lack of Transportation (Non-Medical):  Not on file   Physical Activity:     Days of Exercise per Week: Not on file    Minutes of Exercise per Session: Not on file   Stress:     Feeling of Stress : Not on file   Social Connections:     Frequency of Communication with Friends and Family: Not on file    Frequency of Social Gatherings with Friends and Family: Not on file    Attends [employer] Services: Not on file    Active Member of Clubs or Organizations: Not on file    Attends Club or Organization Meetings: Not on file    Marital Status: Not on file   Intimate Partner Violence:     Fear of Current or Ex-Partner: Not on file    Emotionally Abused: Not on file    Physically Abused: Not on file    Sexually Abused: Not on file   Housing Stability:     Unable to Pay for Housing in the Last Year: Not on file    Number of [place] in the Last Year: Not on file    Unstable Housing in the Last Year: Not on file       Family History   Problem Relation Age of Onset    Breast Cancer Mother     Heart Attack Father             Review of Systems same as above    Physical Exam:  Blood pressure 139/78, pulse 89, temperature 98.1 °F (36.7 °C), temperature source Temporal, resp. rate 16, height 5' 5\" (1.651 m), weight 211 lb 9.6 oz (96 kg), last menstrual period 02/27/2012, SpO2 95 %.'  Constitutional:  No acute distress. HENT:  Oropharynx is clear and moist. No thyromegaly. Eyes:  Conjunctivae arenormal. Pupils equal, round, and reactive to light. No scleral icterus. Neck: . No tracheal deviation present. No obvious thyroid mass. Cardiovascular:Normal rate, regular rhythm, normal heart sounds. No right ventricular heave. Nolower extremity edema. Pulmonary/Chest: No wheezes. No rales. Chest wall is not dull to percussion. Noaccessory muscle usage or stridor. Decreased breath sound intensity  Abdominal: Soft. Bowel sounds present. No distension or hernia. Notenderness. Musculoskeletal: No cyanosis. No clubbing. No obvious joint deformity. Lymphadenopathy: No cervical or supraclavicular adenopathy. Skin: Skin is warm and dry. No rash or nodules on the exposed extremities. Psychiatric: Normal mood and affect. Behavior is normal.  No anxiety. Neurologic: Alert, awake and oriented. PERRL. Speech fluent        Data:     Imaging:  I have reviewed radiology images personally. CT CHEST WO CONTRAST    (Results Pending)     CT CHEST WO CONTRAST    Result Date: 1/25/2022  EXAMINATION: CT OF THE CHEST WITHOUT CONTRAST 1/25/2022 7:44 am TECHNIQUE: CT of the chest was performed without the administration of intravenous contrast. Multiplanar reformatted images are provided for review. Dose modulation, iterative reconstruction, and/or weight based adjustment of the mA/kV was utilized to reduce the radiation dose to as low as reasonably achievable.  COMPARISON: 12/16/2021 and 04/28/2012 HISTORY: ORDERING SYSTEM PROVIDED HISTORY: Pneumonia due to COVID-19 virus TECHNOLOGIST PROVIDED HISTORY: Reason for exam:->covid pneumonia last month, oxygen still dropping at room air. covid symptoms started Beginning of December Reason for Exam: follow up pneumonia; on 02 ; covid hx x 1 month ago Additional signs and symptoms: still having sob Relevant Medical/Surgical History: non smoker; no surgery hx on chest; FINDINGS: Mediastinum: The unenhanced heart is unremarkable. There are mildly enlarged mediastinal lymph nodes, likely reactive. An index subcarinal lymph node measures 1.2 x 2.0 cm. Lungs/pleura: The tracheobronchial tree is patent. There is no pneumothorax or pleural effusion. However, there are diffuse nonspecific ground-glass opacities throughout the bilateral lungs due to an infectious/inflammatory process. In addition, within the superior segment of the right lower lobe, there is a partially cavitating 1.9 x 2.6 cm pulmonary nodule. This may be endobronchial as there are fluid-filled tubular structures distal to the lesion. No change in the 3 mm solid right upper lobe pulmonary nodule. Upper Abdomen: A small hiatal hernia is noted. The liver is diffusely low in attenuation consistent with hepatic steatosis. Soft Tissues/Bones: Degenerative changes involve the thoracic spine and bilateral glenohumeral joints. 1. Diffuse nonspecific ground-glass opacities throughout the bilateral lungs due to infectious/inflammatory process including atypical viral pneumonia. 2. 2.6 cm partially cavitating right lower lobe pulmonary nodule. The differential diagnosis includes a mycetoma, endobronchial lesion and cavitary pneumonia. 3. Mediastinal adenopathy, likely reactive. Assessment:    1. Pneumonia with cavity of lung    - CT CHEST WO CONTRAST; Future    2. Hypoxemia    - CT CHEST WO CONTRAST; Future  - 6 Minute Walk Test; Future    3. Obesity (BMI 30-39.9)      4.  Personal history of COVID-19      5. Pulmonary infiltrates      6.  Mediastinal adenopathy          Plan:   · Patient's review of system were discussed  · Patient was told about the clinical findings during auscultation and implications  · Clinically patient does not appear to have any postinflammatory pulmonary fibrosis at this time  · Patient was shown the CT of the chest along with findings/interpretation/implication and options  · Patient does have some pulmonary infiltrates which may be secondary residual pneumonia she had because of COVID-19 along with that patient has a lung cavity and the differential diagnosis included mycetoma which was discussed  · Patient was told about the options including bronchoscopy biopsy versus conservative management and patient wants to repeat a CT of the chest in 3 months time before she or for any other interventions  · Patient was told about the constitutional symptoms of concern which will warrant her to be seen earlier  · Repeat CT in 3 months time ordered  · Also 6-minute walk test was ordered to assess for patient needs any supplemental oxygen and patient can qualify for any portable oxygen concentrator or not  · Further management depending on patient clinical status and the test results

## 2022-03-07 ENCOUNTER — TELEPHONE (OUTPATIENT)
Dept: PULMONOLOGY | Age: 62
End: 2022-03-07

## 2022-03-07 NOTE — TELEPHONE ENCOUNTER
Pt called and said she is feeling like her breathing has become more labored, however her O2 levels are OK. She is scheduled CT and 6MW on 5/5/22. She wants to know if she should get the CT done sooner to make sure nothing is going on. Please advise.

## 2022-03-07 NOTE — TELEPHONE ENCOUNTER
Spoke with patient and informed of Dr. Nancy Roman response. Patient declined PFT at this time. She will call back if her symptoms increase and she would like to have a PFT done.

## 2022-05-05 ENCOUNTER — HOSPITAL ENCOUNTER (OUTPATIENT)
Dept: CT IMAGING | Age: 62
Discharge: HOME | End: 2022-05-05
Payer: MEDICAID

## 2022-05-05 ENCOUNTER — HOSPITAL ENCOUNTER (OUTPATIENT)
Dept: PULMONOLOGY | Age: 62
Discharge: HOME | End: 2022-05-05
Payer: MEDICAID

## 2022-05-05 ENCOUNTER — OFFICE VISIT (OUTPATIENT)
Dept: PULMONOLOGY | Age: 62
End: 2022-05-05
Payer: MEDICAID

## 2022-05-05 VITALS
OXYGEN SATURATION: 96 % | SYSTOLIC BLOOD PRESSURE: 142 MMHG | DIASTOLIC BLOOD PRESSURE: 78 MMHG | HEIGHT: 65 IN | WEIGHT: 206 LBS | HEART RATE: 72 BPM | RESPIRATION RATE: 18 BRPM | TEMPERATURE: 97.6 F | BODY MASS INDEX: 34.32 KG/M2

## 2022-05-05 DIAGNOSIS — Z86.16 PERSONAL HISTORY OF COVID-19: ICD-10-CM

## 2022-05-05 DIAGNOSIS — J18.9 PNEUMONIA WITH CAVITY OF LUNG: ICD-10-CM

## 2022-05-05 DIAGNOSIS — J98.4 PNEUMONIA WITH CAVITY OF LUNG: ICD-10-CM

## 2022-05-05 DIAGNOSIS — E66.9 OBESITY (BMI 30-39.9): ICD-10-CM

## 2022-05-05 DIAGNOSIS — R09.02 HYPOXEMIA: ICD-10-CM

## 2022-05-05 DIAGNOSIS — J18.9 PNEUMONIA WITH CAVITY OF LUNG: Primary | ICD-10-CM

## 2022-05-05 DIAGNOSIS — R91.8 PULMONARY INFILTRATES: ICD-10-CM

## 2022-05-05 DIAGNOSIS — J98.4 PNEUMONIA WITH CAVITY OF LUNG: Primary | ICD-10-CM

## 2022-05-05 PROCEDURE — 1036F TOBACCO NON-USER: CPT | Performed by: INTERNAL MEDICINE

## 2022-05-05 PROCEDURE — 94618 PULMONARY STRESS TESTING: CPT

## 2022-05-05 PROCEDURE — G8427 DOCREV CUR MEDS BY ELIG CLIN: HCPCS | Performed by: INTERNAL MEDICINE

## 2022-05-05 PROCEDURE — 99214 OFFICE O/P EST MOD 30 MIN: CPT | Performed by: INTERNAL MEDICINE

## 2022-05-05 PROCEDURE — G8417 CALC BMI ABV UP PARAM F/U: HCPCS | Performed by: INTERNAL MEDICINE

## 2022-05-05 PROCEDURE — 3017F COLORECTAL CA SCREEN DOC REV: CPT | Performed by: INTERNAL MEDICINE

## 2022-05-05 PROCEDURE — 71250 CT THORAX DX C-: CPT

## 2022-05-05 NOTE — PROGRESS NOTES
Chief Complaint/Referring Provider:  Pulmonary follow up and to discuss the test results    Patient has come to the office for a pulmonary follow-up and to discuss that clinical status and the test results, patient states that she does not have any more coughing but patient states that she feels that she has a different type of phlegm in the postnasal drainage, it has more of metallic taste, patient does not have any reflux symptoms, patient does not have any otalgia no ear discharge, no epistaxis or hemoptysis, patient does have some mucoid expectoration, patient does not have any significant fever or chills, patient has occasional wheezing, patient also has had some right-sided chest and back pain, patient's appetite is maintained, patient does not have any constant symptoms of concern, patient does not have any nasal sprays at home, patient does not have new medications, patient has issues with mold in the basement, not have any sleep fragmentation at this time, patient states that she feels some short of breath when she bends forward and patient feels that she needs oxygen at that time but patient's saturation has not dropped below 90% at any point, patient does not have any other pertinent review of system of concern       Previous HPI: Patient is a 71-year-old female who has been referred to the office for a pulmonary evaluation for abnormal CT of the chest    Patient states that she was hospitalized for 7 days in middle of December because of pneumonia and then was told that she also had positive Covid at that time, patient states that after recuperation she was discharged home and patient was sent home on oxygen, patient states that she does have some shortness of breath when she walks around for long time, patient also has been having some back and front chest pain at times along with occasional palpitations at nighttime, patient does not have any fever no chills, patient does not have any increasing sinus congestion of concern, no epistaxis or hemoptysis, patient does not have any significant abdominal discomfort nausea vomiting or reflux symptoms, patient does not have any significant change in the bowel movements, no hematochezia or melena, patient does not have any increasing swelling of the lower extremities, patient's weight is maintained, patient does snore at nighttime along with that patient also states naps in the daytime, patient has multiple pets in the form of dog birds and fish along with that patient has a gas based heating system without any humidification, 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 to any flour, patient states that when she climbs the stairs her saturation drops from 95 to 82%, patient does not have any confusion lethargy, patient has not been a smoker, patient does not have any change in the ambient environment, no other pertinent review of system of concern    Past Medical History:   Diagnosis Date    Cancer ([facility] [address]     cervical       Past Surgical History:   Procedure Laterality Date     SECTION      LEEP         Allergies   Allergen Reactions    Other      Sensitive to all medications - everything causes reaction       Medication list was reviewed and updated as needed in Epic    Social History     Socioeconomic History    Marital status:      Spouse name: Not on file    Number of children: Not on file    Years of education: Not on file    Highest education level: Not on file   Occupational History    Not on file   Tobacco Use    Smoking status: Never Smoker    Smokeless tobacco: Never Used   Vaping Use    Vaping Use: Never used   Substance and Sexual Activity    Alcohol use: Yes     Comment: occasional    Drug use: No    Sexual activity: Yes   Other Topics Concern    Not on file   Social History Narrative    Not on file     Social Determinants of Health     Financial Resource Strain:     Difficulty of Paying Living Expenses: Not on file   Food Insecurity:     Worried About [address] [employer] in the Last Year: Not on file    [place] of Food in the Last Year: Not on file   Transportation Needs:     Lack of Transportation (Medical): Not on file    Lack of Transportation (Non-Medical): Not on file   Physical Activity:     Days of Exercise per Week: Not on file    Minutes of Exercise per Session: Not on file   Stress:     Feeling of Stress : Not on file   Social Connections:     Frequency of Communication with Friends and Family: Not on file    Frequency of Social Gatherings with Friends and Family: Not on file    Attends Islam Services: Not on file    Active Member of [address] [place] [address] or Organizations: Not on file    Attends Club or Organization Meetings: Not on file    Marital Status: Not on file   Intimate Partner Violence:     Fear of Current or Ex-Partner: Not on file    Emotionally Abused: Not on file    Physically Abused: Not on file    Sexually Abused: Not on file   Housing Stability:     Unable to Pay for Housing in the Last Year: Not on file    Number of [place] in the Last Year: Not on file    Unstable Housing in the Last Year: Not on file       Family History   Problem Relation Age of Onset    Breast Cancer Mother     Heart Attack Father             Review of Systems same as above    Physical Exam:  Blood pressure (!) 142/78, pulse 72, temperature 97.6 °F (36.4 °C), temperature source Temporal, resp. rate 18, height 5' 5\" (1.651 m), weight 206 lb (93.4 kg), last menstrual period 02/27/2012, SpO2 96 %.'  Constitutional:  No acute distress. HENT:  Oropharynx is clear and moist. No thyromegaly. Eyes:  Conjunctivae arenormal. Pupils equal, round, and reactive to light. No scleral icterus. Neck: . No tracheal deviation present. No obvious thyroid mass. Cardiovascular:Normal rate, regular rhythm, normal heart sounds. No right ventricular heave. Nolower extremity edema. Pulmonary/Chest: No wheezes. No rales. Chest wall is not dull to percussion. Noaccessory muscle usage or stridor. Decreased breath sound intensity  Abdominal: Soft. Bowel sounds present. No distension or hernia. Notenderness. Musculoskeletal: No cyanosis. No clubbing. No obvious joint deformity. Lymphadenopathy: No cervical or supraclavicular adenopathy. Skin: Skin is warm and dry. No rash or nodules on the exposed extremities. Psychiatric: Normal mood and affect. Behavior is normal.  No anxiety. Neurologic: Alert, awake and oriented. PERRL. Speech fluent        Data:     Imaging:  I have reviewed radiology images personally. No orders to display     CT CHEST WO CONTRAST    Result Date: 1/25/2022  EXAMINATION: CT OF THE CHEST WITHOUT CONTRAST 1/25/2022 7:44 am TECHNIQUE: CT of the chest was performed without the administration of intravenous contrast. Multiplanar reformatted images are provided for review. Dose modulation, iterative reconstruction, and/or weight based adjustment of the mA/kV was utilized to reduce the radiation dose to as low as reasonably achievable. COMPARISON: 12/16/2021 and 04/28/2012 HISTORY: ORDERING SYSTEM PROVIDED HISTORY: Pneumonia due to COVID-19 virus TECHNOLOGIST PROVIDED HISTORY: Reason for exam:->covid pneumonia last month, oxygen still dropping at room air. covid symptoms started Beginning of December Reason for Exam: follow up pneumonia; on 02 ; covid hx x 1 month ago Additional signs and symptoms: still having sob Relevant Medical/Surgical History: non smoker; no surgery hx on chest; FINDINGS: Mediastinum: The unenhanced heart is unremarkable. There are mildly enlarged mediastinal lymph nodes, likely reactive. An index subcarinal lymph node measures 1.2 x 2.0 cm. Lungs/pleura: The tracheobronchial tree is patent. There is no pneumothorax or pleural effusion. However, there are diffuse nonspecific ground-glass opacities throughout the bilateral lungs due to an infectious/inflammatory process.   In addition, within the superior segment of the right lower lobe, there is a partially cavitating 1.9 x 2.6 cm pulmonary nodule. This may be endobronchial as there are fluid-filled tubular structures distal to the lesion. No change in the 3 mm solid right upper lobe pulmonary nodule. Upper Abdomen: A small hiatal hernia is noted. The liver is diffusely low in attenuation consistent with hepatic steatosis. Soft Tissues/Bones: Degenerative changes involve the thoracic spine and bilateral glenohumeral joints. 1. Diffuse nonspecific ground-glass opacities throughout the bilateral lungs due to infectious/inflammatory process including atypical viral pneumonia. 2. 2.6 cm partially cavitating right lower lobe pulmonary nodule. The differential diagnosis includes a mycetoma, endobronchial lesion and cavitary pneumonia. 3. Mediastinal adenopathy, likely reactive. CT chest -  CT OF THE CHEST WITHOUT CONTRAST 5/5/2022 11:11 am       TECHNIQUE:   CT of the chest was performed without the administration of intravenous   contrast. Multiplanar reformatted images are provided for review. Dose   modulation, iterative reconstruction, and/or weight based adjustment of the   mA/kV was utilized to reduce the radiation dose to as low as reasonably   achievable.       COMPARISON:   Comparison made to prior chest CTs dated 01/25/2022 and 04/28/2012. Comparison also made to prior abdominal CT dated 12/18/2015.       HISTORY:   ORDERING SYSTEM PROVIDED HISTORY: Pneumonia with cavity of lung   TECHNOLOGIST PROVIDED HISTORY:   Please call Pre registration # [phone] prior to the test date. Please arrive  30 minutes before the procedure unless otherwise instructed.    Reason for Exam: f/u lung nodule, ongoing SOB since pneumonia in Dec 2021   Relevant Medical/Surgical History: no hx of surg to chest; no hx of smoking       FINDINGS:   Mediastinum: The thyroid is unremarkable. Jennifer Seller is stable mild mediastinal   lymphadenopathy, with the largest index lymph node measuring approximately 12   mm in short axis within the subcarinal space, unchanged from 01/25/2022.  No   new pathologic lymphadenopathy is identified.  The intrathoracic aorta is   unremarkable in noncontrast appearance.  No pericardial abnormality   identified.       Lungs/pleura: The central airways are patent. Sophia Scrivener has been interval   improvement and partial interval clearing of diffuse ground-glass opacities   as seen throughout both lungs on the study of 01/25/2022 with residual   diffuse ground-glass opacities remaining.  Localized focus of acute airspace   consolidation is identified. Sophia Scrivener is no evidence of a pneumothorax or   pleural effusion. Sophia Scrivener is mild pleural and parenchymal scarring within the   right middle lobe. Sophia Scrivener is a persistent nonspecific 2.6 x 1.8 cm partially   cavitary nodule within the superior segment of the right lower lobe, similar   in comparison with the study of 01/25/2022.  There are multiple stable   smaller subcentimeter nodular opacities at the periphery of this cavitary   nodule.  Additional scattered subcentimeter solid and ground-glass nodules   throughout both lungs are also similar to the study of 01/25/2022, and are   likely benign and infectious or inflammatory in etiology.  No new suspicious   pulmonary nodule or mass is identified.       Upper Abdomen: The liver is somewhat heterogeneous in attenuation, likely   secondary to fatty infiltration with foci of fatty sparing.  The adrenal   glands are stable and unremarkable.  The remainder of the upper abdomen is   unremarkable.       Soft Tissues/Bones: There is mild multilevel degenerative change throughout   the thoracic spine with multilevel Schmorl's node deformities identified. There is a stable vertebral body hemangioma within the lower thoracic spine. No osteolytic or osteoblastic lesion is identified.           Impression   1.  Some slight interval improvement of diffuse ground-glass airspace   opacities as seen throughout both lungs, with residual opacities remaining,   most consistent with an improving atypical multifocal pneumonia. 2. No significant change in size or appearance of a 2.6 x 1.8 cm cavitary   nodule within the superior segment of the right lower lobe, with peripheral   small subcentimeter nodules remaining, also unchanged.  This could represent   a chronic mycetoma, a slow healing cavitary pneumonia, or possibly a   quiescent cavitary malignancy.  Consider further characterization with a   PET-CT or tissue sampling. 3. Additional stable subcentimeter solid and ground-glass nodules throughout   both lungs are most likely benign and infectious or inflammatory in etiology. 4. Stable mild mediastinal lymphadenopathy, most likely benign and reactive   in etiology. Patient is a 6-minute walk test does not show patient to have any exercise hypoxemia    Assessment:    1. Pneumonia with cavity of lung        2. 3. Obesity (BMI 30-39.9)      4. Personal history of COVID-19      5. Pulmonary infiltrates      6.  Mediastinal adenopathy          Plan:   · Patient's review of system were discussed  · Patient was told about the clinical findings during auscultation and implications  · Clinically patient does not appear to have any postinflammatory pulmonary fibrosis at this time  · Patient was shown the CT of the chest along with findings/interpretation/implication and options  · Patient does have some pulmonary infiltrates which may be secondary residual pneumonia she had because of COVID-19 along with that patient has a lung cavity and the differential diagnosis included mycetoma which was discussed-patient's pulm infiltrates are resolving but patient has persistent cavitary lesion and differential diagnosis includes mycetoma  · Patient was told to have an opinion from CT surgery to see if resection is an option  · It is still possible and CT surgery may require patient to have a bronchoscopy done prior to any surgical intervention but will await further recommendations before any further management and concerns  · Patient does not have any exertional hypoxemia at this time  · Patient was told about the consult for hypoxemia and hypercarbia  · Patient does not need any supplemental oxygen below 88% oxygen saturation  · Patient was told about that there is a weak correlation between shortness of breath and hypoxemia and shortness of breath does not mean that patient needs to be on oxygen  · Patient can be evaluated for overnight pulse oximetry  · Will decide upon oxygen and its continuation after opinion from CT surgery  · Further management depending on patient clinical status and CT surgery opinion

## 2022-05-05 NOTE — PROCEDURES
[facility] Pulmonary Function Lab - Six Minute Walk      Test Performed on:   Room Air__X____   Oxygen at _____ lpm via N/C- continuous Oxygen at _____ lpm via N/C- OCD  Assist Device Used During Test:  None___X___ Cane________ Walker_________    Modified Cinthya's Scale  0 Nothing at all 5 Strong    0.5 Extremely Weak 6 Stronger (Hard)    1 Very weak 7 Very Strong   2 Weak (light) 8 Very Very Strong   3 Moderate 9 Extremely Strong   4 Somewhat Strong 10 Maximum All out      Time SpO2 Heart Rate Respiratory Rate Dyspnea-  Modified Cinthyas Scale Fatigue- Modified Cinthyas Scale Other Symptoms   Baseline                     97% room air @rest 75 18 1 1      1 minute                     96% 98 20 1 1    2 minutes                     95% 100 21 1 1      3 minutes                     95% 105 22 2 2    4 minutes                     95% 103 24 3 3    5 minutes                     96% 103 24 3 3    6 minutes                     95% 103 26 3 4    Recovery x 1 minute                     98% 91 24 3 3    Recovery x 2 Minute                     98% 89 20 2 2     Number of Laps_____13__ X 120 feet + _________ additional feet = Total Distance __1560___feet   Stopped or paused before 6 minutes? No___X____ Yes ________  Total expected 6 MW distance is _1431___feet. Patient achieved __109__% of expected distance.    Pre Blood Pressure: 151/90  Post Blood Pressure: 152/89  Other symptoms at the end of exercise: SOB, some chest pressure, little lightheadedness, hip pain

## 2022-05-05 NOTE — PATIENT INSTRUCTIONS
Remember to bring a list of pulmonary medications and any CPAP or BiPAP machines to your next appointment with the office. Please keep all of your future appointments scheduled by [address], [facility] office. Out of respect for other patients and providers, you may be asked to reschedule your appointment if you arrive later than your scheduled appointment time. Appointments cancelled less than 24hrs in advance will be considered a no show. Patients with three missed appointments within 1 year or four missed appointments within 2 years can be dismissed from the practice. Please be aware that our physicians are required to work in the Intensive Care Unit at [facility].  Your appointment may need to be rescheduled if they are designated to work during your appointment time. You may receive a survey regarding the care you received during your visit. Your input is valuable to us. We encourage you to complete and return your survey. We hope you will choose us in the future for your healthcare needs. Pt instructed of all future appointment dates & times, including radiology, labs, procedures & referrals. If procedures were scheduled preparation instructions provided. Instructions on future appointments with [facility] were given.

## 2022-05-05 NOTE — PROGRESS NOTES
MA Communication:   The following orders are received by verbal communication from Alex Lloyd MD    Orders include:  Referral to Dr. Castro Carls  Follow up as needed

## 2022-05-06 NOTE — PROCEDURES
[address] [address]                               PULMONARY FUNCTION    PATIENT NAME: Mary Shah                      :        1960  MED REC NO:   [id number]                          ROOM:  ACCOUNT NO:   [de-identified]                           ADMIT DATE: 2022  PROVIDER:     Geo Espinosa MD    DATE OF PROCEDURE:  2022    SIX-MINUTE WALK TEST    The patient is a 51-year-old female who underwent a 6-minute walk test.   Baseline oxygen saturation of 97% at room air at rest with a heart rate  of 75, respiratory rate of 18, dyspnea-modified Cinthya scale of 1,  fatigue-modified Cinthya scale of 1. The patient did not have any  exertional hypoxemia on the study. The patient walked 1560 feet. The  patient's total expected walk distance was 1431 feet. The patient  achieved 109% of expected distance. The patient had some shortness of  breath and chest pressure along with hip pain after the exercise. On  the basis of this 6-minute walk test, the patient does not have any  exertional hypoxemia on optimal exercise. Please correlate clinically.         Dandy Shafer MD    D: 2022 17:02:28       T: 2022 17:04:41     [email or web address]  Job#: [id number]     Doc#: [id number]

## 2022-05-09 ENCOUNTER — OFFICE VISIT (OUTPATIENT)
Dept: FAMILY MEDICINE CLINIC | Age: 62
End: 2022-05-09
Payer: MEDICAID

## 2022-05-09 VITALS
OXYGEN SATURATION: 96 % | BODY MASS INDEX: 35.51 KG/M2 | DIASTOLIC BLOOD PRESSURE: 78 MMHG | SYSTOLIC BLOOD PRESSURE: 138 MMHG | WEIGHT: 208 LBS | HEART RATE: 82 BPM | HEIGHT: 64 IN

## 2022-05-09 DIAGNOSIS — B37.2 YEAST DERMATITIS: ICD-10-CM

## 2022-05-09 DIAGNOSIS — E11.9 TYPE 2 DIABETES MELLITUS WITHOUT COMPLICATION, WITHOUT LONG-TERM CURRENT USE OF INSULIN (HCC): Primary | ICD-10-CM

## 2022-05-09 LAB — HBA1C MFR BLD: 6.3 %

## 2022-05-09 PROCEDURE — G8427 DOCREV CUR MEDS BY ELIG CLIN: HCPCS | Performed by: NURSE PRACTITIONER

## 2022-05-09 PROCEDURE — 99213 OFFICE O/P EST LOW 20 MIN: CPT | Performed by: NURSE PRACTITIONER

## 2022-05-09 PROCEDURE — 3044F HG A1C LEVEL LT 7.0%: CPT | Performed by: NURSE PRACTITIONER

## 2022-05-09 PROCEDURE — 2022F DILAT RTA XM EVC RTNOPTHY: CPT | Performed by: NURSE PRACTITIONER

## 2022-05-09 PROCEDURE — G8417 CALC BMI ABV UP PARAM F/U: HCPCS | Performed by: NURSE PRACTITIONER

## 2022-05-09 PROCEDURE — 83036 HEMOGLOBIN GLYCOSYLATED A1C: CPT | Performed by: NURSE PRACTITIONER

## 2022-05-09 PROCEDURE — 1036F TOBACCO NON-USER: CPT | Performed by: NURSE PRACTITIONER

## 2022-05-09 PROCEDURE — 3017F COLORECTAL CA SCREEN DOC REV: CPT | Performed by: NURSE PRACTITIONER

## 2022-05-09 RX ORDER — NYSTATIN 100000 [USP'U]/G
POWDER TOPICAL
Qty: 60 G | Refills: 1 | Status: SHIPPED | OUTPATIENT
Start: 2022-05-09

## 2022-05-09 ASSESSMENT — ENCOUNTER SYMPTOMS
GASTROINTESTINAL NEGATIVE: 1
RESPIRATORY NEGATIVE: 1

## 2022-05-09 NOTE — PROGRESS NOTES
Patient: Farhan Almendarez is a 58 y.o. female who presents today with the following Chief Complaint(s):  Chief Complaint   Patient presents with    3 Month Follow-Up       Assessment:  Encounter Diagnoses   Name Primary?  Type 2 diabetes mellitus without complication, without long-term current use of insulin (HCC) Yes    Yeast dermatitis        Plan:  1. Type 2 diabetes mellitus without complication, without long-term current use of insulin (HCC)  a1c today is stable at 6.3. Patient will continue to work on diet changes. No medications right now. - POCT glycosylated hemoglobin (Hb A1C)    2. Yeast dermatitis  Nystatin powder sent in, patient can apply 3 times a day as needed. HPI  Patient presents today to follow up on diabetes. She has an appointment with CT surgery tomorrow. She has an area on her lung that my need resection, she states she has been told it is not cancerous but it needs to be removed. She reports a recurrent rash under her abdominal folds in the summer months. She states she has used baby powder in the past but it can be uncomfortable. Current Outpatient Medications   Medication Sig Dispense Refill    nystatin (MYCOSTATIN) 470643 UNIT/GM powder Apply 3 times daily. 60 g 1     No current facility-administered medications for this visit. Patient's past medical history, surgical history, family history, medications,and allergies  were all reviewed and updated as appropriate today. Review of Systems   Constitutional: Negative. HENT: Negative. Respiratory: Negative. Cardiovascular: Negative. Gastrointestinal: Negative. Musculoskeletal: Negative. Skin: Positive for rash. Neurological: Negative. Negative for dizziness and headaches. Psychiatric/Behavioral: Negative. Physical Exam  Vitals reviewed. Cardiovascular:      Rate and Rhythm: Normal rate and regular rhythm. Heart sounds: Normal heart sounds.    Pulmonary:      Effort: Pulmonary effort is normal.      Breath sounds: Normal breath sounds. Skin:     General: Skin is warm and dry. Neurological:      Mental Status: She is alert and oriented to person, place, and time. Vitals:    05/09/22 1436   BP: 138/78   Pulse:    SpO2:        This chart was generated using the Dragon dictation system. I created this record but it may contain dictation errors due to the limitation of the software.

## 2022-05-10 ENCOUNTER — OFFICE VISIT (OUTPATIENT)
Dept: CARDIOTHORACIC SURGERY | Age: 62
End: 2022-05-10
Payer: MEDICAID

## 2022-05-10 VITALS
SYSTOLIC BLOOD PRESSURE: 138 MMHG | HEART RATE: 80 BPM | WEIGHT: 208 LBS | DIASTOLIC BLOOD PRESSURE: 74 MMHG | BODY MASS INDEX: 34.66 KG/M2 | HEIGHT: 65 IN | OXYGEN SATURATION: 98 % | TEMPERATURE: 98 F

## 2022-05-10 DIAGNOSIS — J98.4 PNEUMONIA WITH CAVITY OF LUNG: Primary | ICD-10-CM

## 2022-05-10 DIAGNOSIS — J18.9 PNEUMONIA WITH CAVITY OF LUNG: Primary | ICD-10-CM

## 2022-05-10 DIAGNOSIS — R91.1 PULMONARY NODULE: Primary | ICD-10-CM

## 2022-05-10 PROCEDURE — 99205 OFFICE O/P NEW HI 60 MIN: CPT | Performed by: THORACIC SURGERY (CARDIOTHORACIC VASCULAR SURGERY)

## 2022-05-10 PROCEDURE — G8417 CALC BMI ABV UP PARAM F/U: HCPCS | Performed by: THORACIC SURGERY (CARDIOTHORACIC VASCULAR SURGERY)

## 2022-05-10 PROCEDURE — G8427 DOCREV CUR MEDS BY ELIG CLIN: HCPCS | Performed by: THORACIC SURGERY (CARDIOTHORACIC VASCULAR SURGERY)

## 2022-05-10 PROCEDURE — 3017F COLORECTAL CA SCREEN DOC REV: CPT | Performed by: THORACIC SURGERY (CARDIOTHORACIC VASCULAR SURGERY)

## 2022-05-10 PROCEDURE — 1036F TOBACCO NON-USER: CPT | Performed by: THORACIC SURGERY (CARDIOTHORACIC VASCULAR SURGERY)

## 2022-05-10 RX ORDER — AMIODARONE HYDROCHLORIDE 200 MG/1
TABLET ORAL
Qty: 8 TABLET | Refills: 0 | Status: ON HOLD
Start: 2022-05-10 | End: 2022-06-14 | Stop reason: HOSPADM

## 2022-05-10 NOTE — PROGRESS NOTES
Referred by Dr. Genia Tyler    Review of Systems:  Constitutional:  No night sweats, headaches, weight loss. Eyes:  No glaucoma, cataracts. ENMT:  No nosebleeds, deviated septum. + seasonal allergies  Cardiac:  No arrhythmias. Vascular:  No claudication, varicosities. GI:  No PUD, + heartburn. :  No kidney stones, frequent UTIs  Musculoskeletal:  No gout. + arthritis  Respiratory:  No emphysema, asthma. + SOBOE, + RLL lung nodule  Integumentary:  No dermatitis, itching, rash. Neurological:  No stroke, TIAs, seizures. Psychiatric:  No depression, anxiety. Endocrine: No diabetes, thyroid issues. Hematologic:  No bleeding, easy bruising.   Immunologic:  No steroid therapies. + h/o cervical cancer

## 2022-05-10 NOTE — PROGRESS NOTES
Komal Gallagherrel    Age 58 y.o.    female    1960    MRN [id number]    6/13/2022  Arrival Time_____________  OR Time____________210 Min     Procedure(s):  RIGHT VIDEO ASSISTED THORACOSCOPIC SURGERY WITH SEGMENTECTOMY OF RIGHT LOWER LOBE NODULE WITH MEDIASTINAL LYMPHADENECTOMY, INTERCOSTAL NERVE BLOCK AND BRONCHOSCOPY                      General   Surgeon(s):  Caroleen Halsted, MD      DAY ADMIT ___  SDS/OP ___  OUTPT IN BED ___        Phone [phone] (home)     PCP _____________________ Phone_________________ Epic ( ) Epic CE ( ) Appt ________    ADDITIONAL INFO __________________________________ Cardio/Consult _____________    NOTES _____________________________________________________________________    ____________________________________________________________________________    PAT APPT DATE:________ TIME: ________  FAXED QAD: _______  (__) H&P w/ Hospitalist  __________________________________________________________________________  Preop Nurse phone screen complete: _____________  (__) CBC     (__) W/ DIFF ___________     (__) Hgb A1C    ___________  (__) CHEST X RAY   __________  (__) LIPID PROFILE  ___________  (__) EKG   __________  (__) PT/PTT   ___________  (__) PFT's   __________  (__) BMP   ___________  (__) CAROTIDS  __________  (__) CMP   ___________  (__) VEIN MAPPING  __________  (__) U/A   ___________  (__) HISTORY & PHYSICAL __________  (__) URINE C & S  ___________  (__) CARDIAC CLEARANCE __________  (__) U/A W/ FLEX  ___________  (__) PULM.  CLEARANCE __________  (__) SERUM PREGNANCY ___________  (__) Check Epic DOS orders __________  (__) TYPE & SCREEN __________repeat ( ) (__)  __________________ __________  (__) ALBUMIN Mendel Frank ___________  (__)  __________________ __________  (__) TRANSFERRIN  ___________  (__)  __________________ __________  (__) LIVER PROFILE  ___________  (__)  __________________ __________  (__) MRSA NASAL SWAB ___________  (__) URINE PREG DOS __________  (__) SED RATE  ___________  (__) BLOOD SUGAR DOS __________  (__) C-REACTIVE PROTEIN ___________    (__) VITAMIN D HYDROXY ___________  (__) BLOOD THINNERS __________    (__) ACE/ ARBS: _____________________     (__) BETABLOCKERS __________________

## 2022-05-10 NOTE — PROGRESS NOTES
Called patient to verify pharmacy. [facility] [address] . Sent her pre-op amio script there and explained dosing to her. Also informed patient of her cardiac clearance appt with Dr. Suraj Espitia on 6/1 1400. Patient verbalized understanding.

## 2022-05-10 NOTE — PROGRESS NOTES
Consultation H&P        Date of Admission:  No admission date for patient encounter. Date of Consultation:  5/10/2022    PCP:  KIMBERLY Nascimento CNP    Referred    Chief Complaint: Hemoptysis    History of Present Illness: We are asked to see this patient in consultation by Dr. benoit  Angeline Terrazas is a 58 y.o. female who patients have hemoptysis why were admitted to the hospital referred to CT scan which showed lung nodule with fungal light growth. Referred to pulmonary which evaluate for 3 months referred for possible resection not able to rule out malignancy  Patient denies any history of recent travel or changing's she had increased number of bird to 16 from 2. Past Medical History:  Past Medical History:   Diagnosis Date    Cancer ([facility] [address]     cervical    Lung nodule        Past Surgical History:  Past Surgical History:   Procedure Laterality Date     SECTION      LEEP         Home Medications:   Prior to Admission medications    Medication Sig Start Date End Date Taking? Authorizing Provider   nystatin (MYCOSTATIN) 156971 UNIT/GM powder Apply 3 times daily. 22  Yes KIMBERLY Nascimento CNP        Facility Administered Medications: Allergies:     Allergies   Allergen Reactions    Other      Sensitive to all medications - everything causes reaction        Social History:    Working:   Caffeine:   Lifestyle:    Social History     Socioeconomic History    Marital status:      Spouse name: Not on file    Number of children: Not on file    Years of education: Not on file    Highest education level: Not on file   Occupational History    Not on file   Tobacco Use    Smoking status: Never Smoker    Smokeless tobacco: Never Used   Vaping Use    Vaping Use: Never used   Substance and Sexual Activity    Alcohol use: Yes     Comment: occasional    Drug use: No    Sexual activity: Yes   Other Topics Concern    Not on file   Social History Narrative    Not on file     Social Determinants of Health     Financial Resource Strain:     Difficulty of Paying Living Expenses: Not on file   Food Insecurity:     Worried About Running Out of Food in the Last Year: Not on file    [place] of Food in the Last Year: Not on file   Transportation Needs:     Lack of Transportation (Medical): Not on file    Lack of Transportation (Non-Medical): Not on file   Physical Activity:     Days of Exercise per Week: Not on file    Minutes of Exercise per Session: Not on file   Stress:     Feeling of Stress : Not on file   Social Connections:     Frequency of Communication with Friends and Family: Not on file    Frequency of Social Gatherings with Friends and Family: Not on file    Attends Rastafari Services: Not on file    Active Member of Clubs or Organizations: Not on file    Attends Club or Organization Meetings: Not on file    Marital Status: Not on file   Intimate Partner Violence:     Fear of Current or Ex-Partner: Not on file    Emotionally Abused: Not on file    Physically Abused: Not on file    Sexually Abused: Not on file   Housing Stability:     Unable to Pay for Housing in the Last Year: Not on file    Number of [place] in the Last Year: Not on file    Unstable Housing in the Last Year: Not on file       Family History:  Heart Disease:   Stroke:   Cancer:   Diabetes:   Hypertension:   Aneurysm/PVD:         Problem Relation Age of Onset    Breast Cancer Mother     Heart Attack Father        Review of Systems:  Constitutional:  No night sweats, headaches, weight loss. Eyes:  No glaucoma, cataracts. ENMT:  No nosebleeds, deviated septum. Cardiac:  No arrhythmias, previous MI. Vascular:  No claudication, varicosities. GI:  No PUD, heartburn. :  No kidney stones, frequent UTIs  Musculoskeletal:  No arthritis, gout. Respiratory:  No SOB, emphysema, asthma. Integumentary:  No dermatitis, itching, rash.   Neurological:  No stroke, TIAs, seizures. Psychiatric:  No depression, anxiety. Endocrine: No diabetes, thyroid issues. Hematologic:  No bleeding, easy bruising. Immunologic:  No known cancer, steroid therapies. Physical Examination:    /74 (Site: Left Upper Arm, Position: Sitting)   Pulse 80   Temp 98 °F (36.7 °C) (Temporal)   Ht 5' 4.5\" (1.638 m)   Wt 208 lb (94.3 kg)   LMP 02/27/2012   SpO2 98%   BMI 35.15 kg/m²      BP RUE:  BP LUE:   Admission Weight: 208 lb (94.3 kg)   Hand dominance:    General appearance: NAD, well nourished  Eyes: anicteric, PERRLA  ENMT: no scars or lesions, no nasal deformity, normal dentition, no cyanosis of oral mucosa  Neck: no masses, no thyroid enlargement, no JVD. Respiratory: effort is unlabored, symmetric, no crackles, wheezes or rubs. No palpable/percussable abnormalities. Cardiovascular: regular, no murmur. PMI normal, no thrill. No carotid bruits. No edema or varicosities. Abdominal aorta cannot be appreciated given body habitus. GI: abdomen soft, nondistended, no organomegaly. No masses. Lymphatic: no cervical/supraclavicular adenopathy  Musculoskeletal: strength and tone normal. Full ROM. No scoliosis. Extremities: warm and pink. No clubbing or petechiae. Skin: no dermatitis or ulceration. No nodularity or induration. Neuro: CN grossly intact. Sensation and motor function grossly intact. Psychiatric: oriented, appropriate mood/affect. MEDICAL DECISION MAKING/TESTING  Studies personally reviewed. Echo:     CXR:     CT  1. Some slight interval improvement of diffuse ground-glass airspace   opacities as seen throughout both lungs, with residual opacities remaining,   most consistent with an improving atypical multifocal pneumonia.    2. No significant change in size or appearance of a 2.6 x 1.8 cm cavitary   nodule within the superior segment of the right lower lobe, with peripheral   small subcentimeter nodules remaining, also unchanged.  This could represent   a chronic mycetoma, a slow healing cavitary pneumonia, or possibly a   quiescent cavitary malignancy.  Consider further characterization with a   PET-CT or tissue sampling. 3. Additional stable subcentimeter solid and ground-glass nodules throughout   both lungs are most likely benign and infectious or inflammatory in etiology. 4. Stable mild mediastinal lymphadenopathy, most likely benign and reactive   in etiology. PET/CT    PFT    Pathology      Labs:   CBC: No results for input(s): WBC, HGB, HCT, MCV, PLT in the last 72 hours. BMP: No results for input(s): NA, K, CL, CO2, PHOS, BUN, CREATININE, CALCIUM, MG in the last 72 hours. Cardiac Enzymes: No results for input(s): CKTOTAL, CKMB, CKMBINDEX, TROPONINI in the last 72 hours. PT/INR: No results for input(s): PROTIME, INR in the last 72 hours. APTT: No results for input(s): APTT in the last 72 hours. Liver Profile:  Lab Results   Component Value Date    AST 15 12/23/2021    ALT 25 12/23/2021    BILIDIR <0.2 12/23/2021    BILITOT 0.3 12/23/2021    ALKPHOS 60 12/23/2021     Lab Results   Component Value Date    CHOL 239 08/21/2018    HDL 34 08/21/2018    TRIG 280 08/21/2018     UA:   Lab Results   Component Value Date    COLORU Yellow 12/17/2021    PHUR 5.5 12/17/2021    WBCUA 0-2 12/17/2021    RBCUA 3-4 12/17/2021    MUCUS 2+ 12/17/2021    BACTERIA 3+ 12/17/2021    CLARITYU SL CLOUDY 12/17/2021    SPECGRAV 1.020 12/17/2021    LEUKOCYTESUR Negative 12/17/2021    UROBILINOGEN 1.0 12/17/2021    BILIRUBINUR SMALL 12/17/2021    BLOODU Negative 12/17/2021    GLUCOSEU Negative 12/17/2021       History obtained: chart, pt    Risk factors:      Diagnosis: Right lower lobe lung nodule    Plan:     Persistent lung nodule with fungal light growth in the middle. As discussed with the patient I recommend right lower lobe superior segmentectomy came to be cancer we will proceed with lobectomy right lower lobe with mediastinal lymphadenectomy.       Typical periop/postop course reviewed including initial limitations on driving/heavy lifting. Risks, benefits and postoperative complications discussed including bleeding, infection, stroke, death, postop pulmonary and renal issues. I spent 60 minutes of care and visit with this patient, greater than 50% of time was spent in counseling and coordinating care, image interpretation, discussion with other caregiver.   And future planning    Hair Caldera MD FACS

## 2022-05-27 NOTE — PROGRESS NOTES
[facility]   Cardiovascular Evaluation    PATIENT: Caprice Boone  DATE: 2022  MRN: [id number]  CSN: [id number]  : 1960      Primary Care Doctor: KIMBERLY Munoz CNP  Reason for evaluation:   Pre-operative cardiac risk assessment    Subjective:   History of present illness on initial date of evaluation:  Caprice Boone is a 58 y.o. female with a history of morbid obesity, prediabetes, COVID-19 infection, and persistent cavitary pulmonary nodule involving the superior segment of the right lower lobe referred for pre-operative cardiac risk assessment prior to a planned right lower lobe segmentectomy and mediastinal lymphadenectomy. The patient reports that recently she has been feeling well. She has chronic short of breath which she says has been stable and attributes to her underlying pulmonary issues. She is able to perform at least 4 METs and denies any chest pain, lightheadedness, dizziness, palpitations, lowe extremity edema, orthopnea, or paroxysmal nocturnal dyspnea. She has no history of ischemic heart disease, heart failure, renal insufficiency, or stroke. She has been diagnosed with prediabetes and last hemoglobin A1c was 6.3%. She has no history of tobacco use or illicit drug use. She drinks alcohol socially. EKG shows normal sinus rhythm with non-specific ST-T wave abnormality. Patient Active Problem List   Diagnosis    Acute respiratory failure with hypoxia (HCC)    Pneumonia due to COVID-19 virus    2019 novel coronavirus-infected pneumonia ([facility])    History of prediabetes    Obesity (BMI 30-39. 9)    Pneumonia with cavity of lung    Hypoxemia    Personal history of COVID-19    Pulmonary infiltrates    Mediastinal adenopathy         Past Medical History:   has a past medical history of Cancer ([address] and Lung nodule. Surgical History:   has a past surgical history that includes  section and LEEP.      Social History: reports that she has never smoked. She has never used smokeless tobacco. She reports current alcohol use. She reports that she does not use drugs. Family History:  Father -  of MI at age 80    Home Medications:  Reviewed and are listed in nursing record. and/or listed below  Current Outpatient Medications   Medication Sig Dispense Refill    amiodarone (CORDARONE) 200 MG tablet Take 1st dose AM 06, take last dose PM . 8 tablet 0    nystatin (MYCOSTATIN) 760220 UNIT/GM powder Apply 3 times daily. 60 g 1     No current facility-administered medications for this visit. Allergies: Other     Review of Systems:   Review of Systems   Constitutional: Negative for chills and fever. HENT: Negative for congestion, rhinorrhea and sore throat. Eyes: Negative for photophobia, pain and visual disturbance. Respiratory: Positive for shortness of breath. Negative for cough. Cardiovascular: Negative for chest pain, palpitations and leg swelling. Gastrointestinal: Negative for abdominal pain, blood in stool, constipation, diarrhea, nausea and vomiting. Endocrine: Negative for cold intolerance and heat intolerance. Genitourinary: Negative for difficulty urinating, dysuria and hematuria. Musculoskeletal: Negative for arthralgias, joint swelling and myalgias. Skin: Negative for rash and wound. Allergic/Immunologic: Negative for environmental allergies and food allergies. Neurological: Negative for dizziness, syncope and light-headedness. Hematological: Negative for adenopathy. Does not bruise/bleed easily. Psychiatric/Behavioral: Negative for dysphoric mood. The patient is not nervous/anxious.         Objective:   PHYSICAL EXAM:    Vitals:    22 1407   BP: 114/70   Pulse: 84   SpO2: 98%    Weight: 209 lb (94.8 kg)     Wt Readings from Last 3 Encounters:   22 209 lb (94.8 kg)   05/10/22 208 lb (94.3 kg)   22 208 lb (94.3 kg)     General: Adult female in no acute distress. Pleasant and interactive on exam.  HEENT: Normocephalic, atraumatic, non-icteric, hearing intact, nares normal, mucous membranes moist.  Neck: Supple, trachea midline. No adenopathy. No thyromegaly. No carotid bruits. No JVD. Heart: Regular rate and rhythm. Normal S1 and S2. No murmurs, gallops or rubs. Lungs: Normal respiratory effort. Clear to auscultation bilaterally. No wheezes, rales, or rhonchi. Abdomen: Soft, non-tender. Normoactive bowel sounds. No masses or organomegaly. Skin: No rashes, wounds, or lesions. Pulses: 2+ and symmetric. Extremities: No clubbing, cyanosis, or edema. Musculoskeletal: 5/5 strength in upper and lower extremities. Psych: Normal mood and affect. Neuro: Alert and oriented to person, place, and time. No focal deficits noted.       LABS   CBC:      Lab Results   Component Value Date    WBC 7.1 12/23/2021    RBC 4.59 12/23/2021    HGB 13.1 12/23/2021    HCT 38.8 12/23/2021    MCV 84.4 12/23/2021    RDW 13.2 12/23/2021     12/23/2021     CMP:  Lab Results   Component Value Date     12/23/2021    K 4.7 12/23/2021    K 4.1 12/17/2021     12/23/2021    CO2 24 12/23/2021    BUN 20 12/23/2021    CREATININE <0.5 12/23/2021    GFRAA >60 12/23/2021    GFRAA >60 02/27/2013    AGRATIO 0.7 12/17/2021    LABGLOM >60 12/23/2021    GLUCOSE 196 12/23/2021    PROT 6.4 12/23/2021    PROT 6.9 02/27/2013    CALCIUM 9.1 12/23/2021    BILITOT 0.3 12/23/2021    ALKPHOS 60 12/23/2021    AST 15 12/23/2021    ALT 25 12/23/2021     PT/INR:   No results found for: PTINR  Liver:  No components found for: CHLPL  Lab Results   Component Value Date    ALT 25 12/23/2021    AST 15 12/23/2021    ALKPHOS 60 12/23/2021    BILITOT 0.3 12/23/2021     Lab Results   Component Value Date    LABA1C 6.3 05/09/2022     Lipids:         Lab Results   Component Value Date    TRIG 280 (H) 08/21/2018            Lab Results   Component Value Date    HDL 34 (L) 08/21/2018            Lab Results Component Value Date    LDLCALC 149 (H) 08/21/2018            Lab Results   Component Value Date    LABVLDL 56 08/21/2018         CARDIAC DATA   LAST EKG 6/1/22:  Normal sinus rhythm, non-specific ST-T wave abnormality    ECHO: N/A    STRESS TEST: N/A    CARDIAC CATH: N/A    VASCULAR/OTHER IMAGING:  Non-contrast chest CT 5/5/22:  1. Some slight interval improvement of diffuse ground-glass airspace   opacities as seen throughout both lungs, with residual opacities remaining,   most consistent with an improving atypical multifocal pneumonia. 2. No significant change in size or appearance of a 2.6 x 1.8 cm cavitary   nodule within the superior segment of the right lower lobe, with peripheral   small subcentimeter nodules remaining, also unchanged.  This could represent   a chronic mycetoma, a slow healing cavitary pneumonia, or possibly a   quiescent cavitary malignancy.  Consider further characterization with a   PET-CT or tissue sampling. 3. Additional stable subcentimeter solid and ground-glass nodules throughout   both lungs are most likely benign and infectious or inflammatory in etiology. 4. Stable mild mediastinal lymphadenopathy, most likely benign and reactive   in etiology. Assessment:     1. Pre-operative cardiac risk assessment  2. Persistent cavitary pulmonary nodule involving the superior segment of the right lower lobe  3. Prediabetes  4. Morbid obesity  5. H/o COVID-19 infection     Plan:     1. RCRI is 0, patient denies angina, and is able to perform at least 4 METs. Baseline EKG shows normal sinus rhythm with non-specific ST-T wave abnormality. Overall, she is considered low risk for an adverse perioperative cardiovascular event in the setting of an intermediate risk surgical procedure. 2. No additional cardiac evaluation is currently indicated prior to proceeding with the planned right lower lobe segmentectomy and mediastinal lymphadenectomy.   3. She is welcome to follow-up with me in the future on an as needed basis. Brianneibalo's attestation: This note was scribed in the presence of Addi Padilla DO by Archie Hooks RN      It is a pleasure to assist in the care of [place]. Please call with any questions. The scribes documentation has been prepared under my direction and personally reviewed by me in its entirety. I confirm that the note above accurately reflects all work, treatment, procedures, and medical decision making performed by me. I, Dr. Roger Hammond, personally performed the services described in this documentation as scribed by Archie Hooks, KENA in my presence, and it is both accurate and complete to the best of our ability.        Roger Hammond, [address] [facility]  [phone] [facility]  [phone] [address] [place] [address]

## 2022-05-31 ENCOUNTER — TELEPHONE (OUTPATIENT)
Dept: CARDIOTHORACIC SURGERY | Age: 62
End: 2022-05-31

## 2022-06-01 ENCOUNTER — HOSPITAL ENCOUNTER (OUTPATIENT)
Dept: GENERAL RADIOLOGY | Age: 62
Discharge: HOME | End: 2022-06-01
Payer: MEDICAID

## 2022-06-01 ENCOUNTER — HOSPITAL ENCOUNTER (OUTPATIENT)
Dept: PREADMISSION TESTING | Age: 62
Discharge: HOME | End: 2022-06-01
Payer: MEDICAID

## 2022-06-01 ENCOUNTER — OFFICE VISIT (OUTPATIENT)
Dept: CARDIOLOGY CLINIC | Age: 62
End: 2022-06-01
Payer: MEDICAID

## 2022-06-01 VITALS
SYSTOLIC BLOOD PRESSURE: 114 MMHG | HEART RATE: 84 BPM | OXYGEN SATURATION: 98 % | DIASTOLIC BLOOD PRESSURE: 70 MMHG | BODY MASS INDEX: 34.82 KG/M2 | HEIGHT: 65 IN | WEIGHT: 209 LBS

## 2022-06-01 DIAGNOSIS — Z01.810 PREOP CARDIOVASCULAR EXAM: Primary | ICD-10-CM

## 2022-06-01 DIAGNOSIS — R91.1 PULMONARY NODULE: ICD-10-CM

## 2022-06-01 DIAGNOSIS — Z86.16 HISTORY OF COVID-19: ICD-10-CM

## 2022-06-01 DIAGNOSIS — R73.03 PREDIABETES: ICD-10-CM

## 2022-06-01 DIAGNOSIS — Z01.811 PRE-OP CHEST EXAM: ICD-10-CM

## 2022-06-01 DIAGNOSIS — E66.01 MORBID OBESITY (HCC): ICD-10-CM

## 2022-06-01 LAB
ABO/RH: NORMAL
ANION GAP SERPL CALCULATED.3IONS-SCNC: 15 MMOL/L (ref 3–16)
ANTIBODY SCREEN: NORMAL
APTT: 27.8 SEC (ref 23–34.3)
BASOPHILS ABSOLUTE: 0 K/UL (ref 0–0.2)
BASOPHILS RELATIVE PERCENT: 0.5 %
BILIRUBIN URINE: NEGATIVE
BLOOD, URINE: NEGATIVE
BUN BLDV-MCNC: 12 MG/DL (ref 7–20)
CALCIUM SERPL-MCNC: 9.5 MG/DL (ref 8.3–10.6)
CHLORIDE BLD-SCNC: 106 MMOL/L (ref 99–110)
CLARITY: CLEAR
CO2: 21 MMOL/L (ref 21–32)
COLOR: YELLOW
CREAT SERPL-MCNC: 0.6 MG/DL (ref 0.6–1.2)
EOSINOPHILS ABSOLUTE: 0.1 K/UL (ref 0–0.6)
EOSINOPHILS RELATIVE PERCENT: 2 %
GFR AFRICAN AMERICAN: >60
GFR NON-AFRICAN AMERICAN: >60
GLUCOSE BLD-MCNC: 97 MG/DL (ref 70–99)
GLUCOSE URINE: NEGATIVE MG/DL
HCT VFR BLD CALC: 40.1 % (ref 36–48)
HEMOGLOBIN: 13.5 G/DL (ref 12–16)
INR BLD: 1 (ref 0.87–1.14)
KETONES, URINE: NEGATIVE MG/DL
LEUKOCYTE ESTERASE, URINE: NEGATIVE
LYMPHOCYTES ABSOLUTE: 2.2 K/UL (ref 1–5.1)
LYMPHOCYTES RELATIVE PERCENT: 31 %
MCH RBC QN AUTO: 28.5 PG (ref 26–34)
MCHC RBC AUTO-ENTMCNC: 33.8 G/DL (ref 31–36)
MCV RBC AUTO: 84.5 FL (ref 80–100)
MICROSCOPIC EXAMINATION: NORMAL
MONOCYTES ABSOLUTE: 0.5 K/UL (ref 0–1.3)
MONOCYTES RELATIVE PERCENT: 6.8 %
NEUTROPHILS ABSOLUTE: 4.2 K/UL (ref 1.7–7.7)
NEUTROPHILS RELATIVE PERCENT: 59.7 %
NITRITE, URINE: NEGATIVE
PDW BLD-RTO: 13.7 % (ref 12.4–15.4)
PH UA: 5.5 (ref 5–8)
PLATELET # BLD: 156 K/UL (ref 135–450)
PMV BLD AUTO: 9 FL (ref 5–10.5)
POTASSIUM SERPL-SCNC: 3.7 MMOL/L (ref 3.5–5.1)
PROTEIN UA: NEGATIVE MG/DL
PROTHROMBIN TIME: 13 SEC (ref 11.7–14.5)
RBC # BLD: 4.75 M/UL (ref 4–5.2)
SODIUM BLD-SCNC: 142 MMOL/L (ref 136–145)
SPECIFIC GRAVITY UA: >=1.03 (ref 1–1.03)
URINE REFLEX TO CULTURE: NORMAL
URINE TYPE: NORMAL
UROBILINOGEN, URINE: 0.2 E.U./DL
WBC # BLD: 7 K/UL (ref 4–11)

## 2022-06-01 PROCEDURE — 86900 BLOOD TYPING SEROLOGIC ABO: CPT

## 2022-06-01 PROCEDURE — 71046 X-RAY EXAM CHEST 2 VIEWS: CPT

## 2022-06-01 PROCEDURE — 86850 RBC ANTIBODY SCREEN: CPT

## 2022-06-01 PROCEDURE — 80048 BASIC METABOLIC PNL TOTAL CA: CPT

## 2022-06-01 PROCEDURE — 86901 BLOOD TYPING SEROLOGIC RH(D): CPT

## 2022-06-01 PROCEDURE — 85025 COMPLETE CBC W/AUTO DIFF WBC: CPT

## 2022-06-01 PROCEDURE — 85610 PROTHROMBIN TIME: CPT

## 2022-06-01 PROCEDURE — 99244 OFF/OP CNSLTJ NEW/EST MOD 40: CPT | Performed by: INTERNAL MEDICINE

## 2022-06-01 PROCEDURE — 93005 ELECTROCARDIOGRAM TRACING: CPT | Performed by: THORACIC SURGERY (CARDIOTHORACIC VASCULAR SURGERY)

## 2022-06-01 PROCEDURE — 81003 URINALYSIS AUTO W/O SCOPE: CPT

## 2022-06-01 PROCEDURE — 36415 COLL VENOUS BLD VENIPUNCTURE: CPT

## 2022-06-01 PROCEDURE — G8427 DOCREV CUR MEDS BY ELIG CLIN: HCPCS | Performed by: INTERNAL MEDICINE

## 2022-06-01 PROCEDURE — G8417 CALC BMI ABV UP PARAM F/U: HCPCS | Performed by: INTERNAL MEDICINE

## 2022-06-01 PROCEDURE — 93000 ELECTROCARDIOGRAM COMPLETE: CPT | Performed by: INTERNAL MEDICINE

## 2022-06-01 PROCEDURE — 85730 THROMBOPLASTIN TIME PARTIAL: CPT

## 2022-06-01 NOTE — PATIENT INSTRUCTIONS
Patient Plan:  1. Patient is considered a low risk for upcoming procedure. OK to proceed with surgery.   2. Follow up with me as needed

## 2022-06-01 NOTE — LETTER
[address] [facility] - [address]  Phone: [phone]  Fax: [phone]    DO Mariya Merchant 1960 June 1, 2022      To whom it may concern,     Patient is considered a low risk for an adverse perioperative event in the setting of an intermediate surgical procedure. OK to proceed with surgery without further cardiovascular intervention. Please call our office with any further questions.        Sincerely,        Connor Padilla DO

## 2022-06-02 LAB
EKG ATRIAL RATE: 76 BPM
EKG DIAGNOSIS: NORMAL
EKG P AXIS: 61 DEGREES
EKG P-R INTERVAL: 166 MS
EKG Q-T INTERVAL: 382 MS
EKG QRS DURATION: 98 MS
EKG QTC CALCULATION (BAZETT): 429 MS
EKG R AXIS: 47 DEGREES
EKG T AXIS: 3 DEGREES
EKG VENTRICULAR RATE: 76 BPM

## 2022-06-02 PROCEDURE — 93010 ELECTROCARDIOGRAM REPORT: CPT | Performed by: INTERNAL MEDICINE

## 2022-06-02 NOTE — PROGRESS NOTES
Cardiac clearance noted from cardiology appt yesterday 06/01/22. Per Dr. Myer Gilford,  \"1. Patient is considered a low risk for upcoming procedure. OK to proceed with surgery. \"

## 2022-06-04 ASSESSMENT — ENCOUNTER SYMPTOMS
VOMITING: 0
ABDOMINAL PAIN: 0
DIARRHEA: 0
COUGH: 0
BLOOD IN STOOL: 0
CONSTIPATION: 0
SORE THROAT: 0
PHOTOPHOBIA: 0
EYE PAIN: 0
NAUSEA: 0
RHINORRHEA: 0
SHORTNESS OF BREATH: 1

## 2022-06-09 RX ORDER — ACETAMINOPHEN 325 MG/1
650 TABLET ORAL EVERY 6 HOURS PRN
COMMUNITY

## 2022-06-10 ENCOUNTER — ANESTHESIA EVENT (OUTPATIENT)
Dept: OPERATING ROOM | Age: 62
DRG: 120 | End: 2022-06-10
Payer: MEDICAID

## 2022-06-13 ENCOUNTER — HOSPITAL ENCOUNTER (INPATIENT)
Age: 62
LOS: 1 days | Discharge: HOME HEALTH | DRG: 120 | End: 2022-06-14
Attending: THORACIC SURGERY (CARDIOTHORACIC VASCULAR SURGERY) | Admitting: THORACIC SURGERY (CARDIOTHORACIC VASCULAR SURGERY)
Payer: MEDICAID

## 2022-06-13 ENCOUNTER — ANESTHESIA (OUTPATIENT)
Dept: OPERATING ROOM | Age: 62
DRG: 120 | End: 2022-06-13
Payer: MEDICAID

## 2022-06-13 ENCOUNTER — APPOINTMENT (OUTPATIENT)
Dept: GENERAL RADIOLOGY | Age: 62
DRG: 120 | End: 2022-06-13
Attending: THORACIC SURGERY (CARDIOTHORACIC VASCULAR SURGERY)
Payer: MEDICAID

## 2022-06-13 DIAGNOSIS — G89.18 ACUTE POST-OPERATIVE PAIN: Primary | ICD-10-CM

## 2022-06-13 DIAGNOSIS — R91.1 LUNG NODULE: ICD-10-CM

## 2022-06-13 LAB
ABO/RH: NORMAL
ANTIBODY SCREEN: NORMAL
GLUCOSE BLD-MCNC: 102 MG/DL (ref 70–99)
PERFORMED ON: ABNORMAL

## 2022-06-13 PROCEDURE — 32666 THORACOSCOPY W/WEDGE RESECT: CPT

## 2022-06-13 PROCEDURE — C1729 CATH, DRAINAGE: HCPCS | Performed by: THORACIC SURGERY (CARDIOTHORACIC VASCULAR SURGERY)

## 2022-06-13 PROCEDURE — 87075 CULTR BACTERIA EXCEPT BLOOD: CPT

## 2022-06-13 PROCEDURE — 2709999900 HC NON-CHARGEABLE SUPPLY: Performed by: THORACIC SURGERY (CARDIOTHORACIC VASCULAR SURGERY)

## 2022-06-13 PROCEDURE — 2000000000 HC ICU R&B

## 2022-06-13 PROCEDURE — 71045 X-RAY EXAM CHEST 1 VIEW: CPT

## 2022-06-13 PROCEDURE — 36415 COLL VENOUS BLD VENIPUNCTURE: CPT

## 2022-06-13 PROCEDURE — 87116 MYCOBACTERIA CULTURE: CPT

## 2022-06-13 PROCEDURE — 88307 TISSUE EXAM BY PATHOLOGIST: CPT

## 2022-06-13 PROCEDURE — 6360000002 HC RX W HCPCS: Performed by: NURSE ANESTHETIST, CERTIFIED REGISTERED

## 2022-06-13 PROCEDURE — 87015 SPECIMEN INFECT AGNT CONCNTJ: CPT

## 2022-06-13 PROCEDURE — 2580000003 HC RX 258: Performed by: ANESTHESIOLOGY

## 2022-06-13 PROCEDURE — 87102 FUNGUS ISOLATION CULTURE: CPT

## 2022-06-13 PROCEDURE — 0BJ08ZZ INSPECTION OF TRACHEOBRONCHIAL TREE, VIA NATURAL OR ARTIFICIAL OPENING ENDOSCOPIC: ICD-10-PCS | Performed by: THORACIC SURGERY (CARDIOTHORACIC VASCULAR SURGERY)

## 2022-06-13 PROCEDURE — 86850 RBC ANTIBODY SCREEN: CPT

## 2022-06-13 PROCEDURE — 87206 SMEAR FLUORESCENT/ACID STAI: CPT

## 2022-06-13 PROCEDURE — 3E0T3BZ INTRODUCTION OF ANESTHETIC AGENT INTO PERIPHERAL NERVES AND PLEXI, PERCUTANEOUS APPROACH: ICD-10-PCS | Performed by: THORACIC SURGERY (CARDIOTHORACIC VASCULAR SURGERY)

## 2022-06-13 PROCEDURE — 3700000000 HC ANESTHESIA ATTENDED CARE: Performed by: THORACIC SURGERY (CARDIOTHORACIC VASCULAR SURGERY)

## 2022-06-13 PROCEDURE — 86901 BLOOD TYPING SEROLOGIC RH(D): CPT

## 2022-06-13 PROCEDURE — 3700000001 HC ADD 15 MINUTES (ANESTHESIA): Performed by: THORACIC SURGERY (CARDIOTHORACIC VASCULAR SURGERY)

## 2022-06-13 PROCEDURE — 87070 CULTURE OTHR SPECIMN AEROBIC: CPT

## 2022-06-13 PROCEDURE — 6360000002 HC RX W HCPCS: Performed by: THORACIC SURGERY (CARDIOTHORACIC VASCULAR SURGERY)

## 2022-06-13 PROCEDURE — 2500000003 HC RX 250 WO HCPCS: Performed by: NURSE ANESTHETIST, CERTIFIED REGISTERED

## 2022-06-13 PROCEDURE — 3600000008 HC SURGERY OHS BASE: Performed by: THORACIC SURGERY (CARDIOTHORACIC VASCULAR SURGERY)

## 2022-06-13 PROCEDURE — 6370000000 HC RX 637 (ALT 250 FOR IP): Performed by: THORACIC SURGERY (CARDIOTHORACIC VASCULAR SURGERY)

## 2022-06-13 PROCEDURE — 32666 THORACOSCOPY W/WEDGE RESECT: CPT | Performed by: THORACIC SURGERY (CARDIOTHORACIC VASCULAR SURGERY)

## 2022-06-13 PROCEDURE — 2580000003 HC RX 258

## 2022-06-13 PROCEDURE — 3600000018 HC SURGERY OHS ADDTL 15MIN: Performed by: THORACIC SURGERY (CARDIOTHORACIC VASCULAR SURGERY)

## 2022-06-13 PROCEDURE — 2580000003 HC RX 258: Performed by: NURSE ANESTHETIST, CERTIFIED REGISTERED

## 2022-06-13 PROCEDURE — 6370000000 HC RX 637 (ALT 250 FOR IP)

## 2022-06-13 PROCEDURE — 88312 SPECIAL STAINS GROUP 1: CPT

## 2022-06-13 PROCEDURE — 6360000002 HC RX W HCPCS: Performed by: ANESTHESIOLOGY

## 2022-06-13 PROCEDURE — 0BTF4ZZ RESECTION OF RIGHT LOWER LUNG LOBE, PERCUTANEOUS ENDOSCOPIC APPROACH: ICD-10-PCS | Performed by: THORACIC SURGERY (CARDIOTHORACIC VASCULAR SURGERY)

## 2022-06-13 PROCEDURE — 88331 PATH CONSLTJ SURG 1 BLK 1SPC: CPT

## 2022-06-13 PROCEDURE — 87205 SMEAR GRAM STAIN: CPT

## 2022-06-13 PROCEDURE — 86900 BLOOD TYPING SEROLOGIC ABO: CPT

## 2022-06-13 PROCEDURE — 2720000010 HC SURG SUPPLY STERILE: Performed by: THORACIC SURGERY (CARDIOTHORACIC VASCULAR SURGERY)

## 2022-06-13 RX ORDER — SODIUM CHLORIDE 0.9 % (FLUSH) 0.9 %
5-40 SYRINGE (ML) INJECTION EVERY 12 HOURS SCHEDULED
Status: DISCONTINUED | OUTPATIENT
Start: 2022-06-13 | End: 2022-06-14 | Stop reason: HOSPADM

## 2022-06-13 RX ORDER — MIDAZOLAM HYDROCHLORIDE 1 MG/ML
INJECTION INTRAMUSCULAR; INTRAVENOUS PRN
Status: DISCONTINUED | OUTPATIENT
Start: 2022-06-13 | End: 2022-06-13 | Stop reason: SDUPTHER

## 2022-06-13 RX ORDER — LABETALOL HYDROCHLORIDE 5 MG/ML
10 INJECTION, SOLUTION INTRAVENOUS
Status: DISCONTINUED | OUTPATIENT
Start: 2022-06-13 | End: 2022-06-13

## 2022-06-13 RX ORDER — SODIUM CHLORIDE 9 MG/ML
INJECTION, SOLUTION INTRAVENOUS PRN
Status: DISCONTINUED | OUTPATIENT
Start: 2022-06-13 | End: 2022-06-14 | Stop reason: HOSPADM

## 2022-06-13 RX ORDER — KETAMINE HYDROCHLORIDE 50 MG/ML
INJECTION, SOLUTION, CONCENTRATE INTRAMUSCULAR; INTRAVENOUS PRN
Status: DISCONTINUED | OUTPATIENT
Start: 2022-06-13 | End: 2022-06-13 | Stop reason: SDUPTHER

## 2022-06-13 RX ORDER — SODIUM CHLORIDE, SODIUM LACTATE, POTASSIUM CHLORIDE, CALCIUM CHLORIDE 600; 310; 30; 20 MG/100ML; MG/100ML; MG/100ML; MG/100ML
INJECTION, SOLUTION INTRAVENOUS CONTINUOUS PRN
Status: DISCONTINUED | OUTPATIENT
Start: 2022-06-13 | End: 2022-06-13 | Stop reason: SDUPTHER

## 2022-06-13 RX ORDER — MAGNESIUM SULFATE HEPTAHYDRATE 500 MG/ML
INJECTION, SOLUTION INTRAMUSCULAR; INTRAVENOUS PRN
Status: DISCONTINUED | OUTPATIENT
Start: 2022-06-13 | End: 2022-06-13 | Stop reason: SDUPTHER

## 2022-06-13 RX ORDER — LIDOCAINE HYDROCHLORIDE 20 MG/ML
INJECTION, SOLUTION EPIDURAL; INFILTRATION; INTRACAUDAL; PERINEURAL PRN
Status: DISCONTINUED | OUTPATIENT
Start: 2022-06-13 | End: 2022-06-13 | Stop reason: SDUPTHER

## 2022-06-13 RX ORDER — SODIUM CHLORIDE 0.9 % (FLUSH) 0.9 %
5-40 SYRINGE (ML) INJECTION EVERY 12 HOURS SCHEDULED
Status: DISCONTINUED | OUTPATIENT
Start: 2022-06-13 | End: 2022-06-13

## 2022-06-13 RX ORDER — FENTANYL CITRATE 50 UG/ML
INJECTION, SOLUTION INTRAMUSCULAR; INTRAVENOUS PRN
Status: DISCONTINUED | OUTPATIENT
Start: 2022-06-13 | End: 2022-06-13 | Stop reason: SDUPTHER

## 2022-06-13 RX ORDER — OXYCODONE HYDROCHLORIDE 5 MG/1
5 TABLET ORAL PRN
Status: DISCONTINUED | OUTPATIENT
Start: 2022-06-13 | End: 2022-06-13

## 2022-06-13 RX ORDER — DIPHENHYDRAMINE HYDROCHLORIDE 50 MG/ML
12.5 INJECTION INTRAMUSCULAR; INTRAVENOUS
Status: DISCONTINUED | OUTPATIENT
Start: 2022-06-13 | End: 2022-06-13

## 2022-06-13 RX ORDER — SODIUM CHLORIDE 9 MG/ML
25 INJECTION, SOLUTION INTRAVENOUS PRN
Status: DISCONTINUED | OUTPATIENT
Start: 2022-06-13 | End: 2022-06-13

## 2022-06-13 RX ORDER — PROPOFOL 10 MG/ML
INJECTION, EMULSION INTRAVENOUS PRN
Status: DISCONTINUED | OUTPATIENT
Start: 2022-06-13 | End: 2022-06-13 | Stop reason: SDUPTHER

## 2022-06-13 RX ORDER — OXYCODONE HYDROCHLORIDE 5 MG/1
10 TABLET ORAL EVERY 4 HOURS PRN
Status: DISCONTINUED | OUTPATIENT
Start: 2022-06-13 | End: 2022-06-14

## 2022-06-13 RX ORDER — EPINEPHRINE 1 MG/ML
INJECTION, SOLUTION, CONCENTRATE INTRAVENOUS PRN
Status: DISCONTINUED | OUTPATIENT
Start: 2022-06-13 | End: 2022-06-13 | Stop reason: SDUPTHER

## 2022-06-13 RX ORDER — HYDROMORPHONE HCL 110MG/55ML
0.5 PATIENT CONTROLLED ANALGESIA SYRINGE INTRAVENOUS EVERY 5 MIN PRN
Status: DISCONTINUED | OUTPATIENT
Start: 2022-06-13 | End: 2022-06-13

## 2022-06-13 RX ORDER — OXYCODONE HYDROCHLORIDE 5 MG/1
5 TABLET ORAL EVERY 4 HOURS PRN
Status: DISCONTINUED | OUTPATIENT
Start: 2022-06-13 | End: 2022-06-14

## 2022-06-13 RX ORDER — SODIUM CHLORIDE 0.9 % (FLUSH) 0.9 %
5-40 SYRINGE (ML) INJECTION PRN
Status: DISCONTINUED | OUTPATIENT
Start: 2022-06-13 | End: 2022-06-14 | Stop reason: HOSPADM

## 2022-06-13 RX ORDER — DEXAMETHASONE SODIUM PHOSPHATE 4 MG/ML
INJECTION, SOLUTION INTRA-ARTICULAR; INTRALESIONAL; INTRAMUSCULAR; INTRAVENOUS; SOFT TISSUE PRN
Status: DISCONTINUED | OUTPATIENT
Start: 2022-06-13 | End: 2022-06-13 | Stop reason: SDUPTHER

## 2022-06-13 RX ORDER — MEPERIDINE HYDROCHLORIDE 50 MG/ML
12.5 INJECTION INTRAMUSCULAR; INTRAVENOUS; SUBCUTANEOUS EVERY 5 MIN PRN
Status: DISCONTINUED | OUTPATIENT
Start: 2022-06-13 | End: 2022-06-13

## 2022-06-13 RX ORDER — HYDROMORPHONE HCL 110MG/55ML
0.25 PATIENT CONTROLLED ANALGESIA SYRINGE INTRAVENOUS EVERY 5 MIN PRN
Status: DISCONTINUED | OUTPATIENT
Start: 2022-06-13 | End: 2022-06-13

## 2022-06-13 RX ORDER — ONDANSETRON 4 MG/1
4 TABLET, ORALLY DISINTEGRATING ORAL EVERY 8 HOURS PRN
Status: DISCONTINUED | OUTPATIENT
Start: 2022-06-13 | End: 2022-06-14 | Stop reason: HOSPADM

## 2022-06-13 RX ORDER — ONDANSETRON 2 MG/ML
4 INJECTION INTRAMUSCULAR; INTRAVENOUS
Status: COMPLETED | OUTPATIENT
Start: 2022-06-13 | End: 2022-06-13

## 2022-06-13 RX ORDER — ACETAMINOPHEN 325 MG/1
650 TABLET ORAL EVERY 4 HOURS PRN
Status: DISCONTINUED | OUTPATIENT
Start: 2022-06-13 | End: 2022-06-14 | Stop reason: HOSPADM

## 2022-06-13 RX ORDER — SODIUM CHLORIDE 0.9 % (FLUSH) 0.9 %
5-40 SYRINGE (ML) INJECTION PRN
Status: DISCONTINUED | OUTPATIENT
Start: 2022-06-13 | End: 2022-06-13

## 2022-06-13 RX ORDER — HYDROMORPHONE HCL 110MG/55ML
PATIENT CONTROLLED ANALGESIA SYRINGE INTRAVENOUS PRN
Status: DISCONTINUED | OUTPATIENT
Start: 2022-06-13 | End: 2022-06-13 | Stop reason: SDUPTHER

## 2022-06-13 RX ORDER — ONDANSETRON 2 MG/ML
4 INJECTION INTRAMUSCULAR; INTRAVENOUS EVERY 6 HOURS PRN
Status: DISCONTINUED | OUTPATIENT
Start: 2022-06-13 | End: 2022-06-14 | Stop reason: HOSPADM

## 2022-06-13 RX ORDER — ONDANSETRON 2 MG/ML
INJECTION INTRAMUSCULAR; INTRAVENOUS PRN
Status: DISCONTINUED | OUTPATIENT
Start: 2022-06-13 | End: 2022-06-13 | Stop reason: SDUPTHER

## 2022-06-13 RX ORDER — ROCURONIUM BROMIDE 10 MG/ML
INJECTION, SOLUTION INTRAVENOUS PRN
Status: DISCONTINUED | OUTPATIENT
Start: 2022-06-13 | End: 2022-06-13 | Stop reason: SDUPTHER

## 2022-06-13 RX ORDER — SODIUM CHLORIDE, SODIUM LACTATE, POTASSIUM CHLORIDE, CALCIUM CHLORIDE 600; 310; 30; 20 MG/100ML; MG/100ML; MG/100ML; MG/100ML
INJECTION, SOLUTION INTRAVENOUS CONTINUOUS
Status: DISCONTINUED | OUTPATIENT
Start: 2022-06-13 | End: 2022-06-13

## 2022-06-13 RX ORDER — OXYCODONE HYDROCHLORIDE 5 MG/1
10 TABLET ORAL PRN
Status: DISCONTINUED | OUTPATIENT
Start: 2022-06-13 | End: 2022-06-13

## 2022-06-13 RX ADMIN — DEXMEDETOMIDINE HYDROCHLORIDE 4 MCG: 100 INJECTION, SOLUTION INTRAVENOUS at 14:39

## 2022-06-13 RX ADMIN — SODIUM CHLORIDE, PRESERVATIVE FREE 10 ML: 5 INJECTION INTRAVENOUS at 20:23

## 2022-06-13 RX ADMIN — MAGNESIUM SULFATE HEPTAHYDRATE 1 G: 500 INJECTION, SOLUTION INTRAMUSCULAR; INTRAVENOUS at 14:33

## 2022-06-13 RX ADMIN — LIDOCAINE HYDROCHLORIDE 80 MG: 20 INJECTION, SOLUTION EPIDURAL; INFILTRATION; INTRACAUDAL; PERINEURAL at 13:03

## 2022-06-13 RX ADMIN — CEFAZOLIN 2 G: 10 INJECTION, POWDER, FOR SOLUTION INTRAVENOUS at 13:06

## 2022-06-13 RX ADMIN — MIDAZOLAM HYDROCHLORIDE 2 MG: 2 INJECTION, SOLUTION INTRAMUSCULAR; INTRAVENOUS at 12:59

## 2022-06-13 RX ADMIN — DEXAMETHASONE SODIUM PHOSPHATE 10 MG: 4 INJECTION, SOLUTION INTRAMUSCULAR; INTRAVENOUS at 13:03

## 2022-06-13 RX ADMIN — SODIUM CHLORIDE, SODIUM LACTATE, POTASSIUM CHLORIDE, AND CALCIUM CHLORIDE: .6; .31; .03; .02 INJECTION, SOLUTION INTRAVENOUS at 12:59

## 2022-06-13 RX ADMIN — PROPOFOL 200 MG: 10 INJECTION, EMULSION INTRAVENOUS at 13:03

## 2022-06-13 RX ADMIN — ONDANSETRON 4 MG: 2 INJECTION INTRAMUSCULAR; INTRAVENOUS at 15:30

## 2022-06-13 RX ADMIN — ACETAMINOPHEN 650 MG: 325 TABLET ORAL at 20:22

## 2022-06-13 RX ADMIN — EPINEPHRINE 5 MCG: 1 INJECTION, SOLUTION, CONCENTRATE INTRAVENOUS at 13:49

## 2022-06-13 RX ADMIN — DEXMEDETOMIDINE HYDROCHLORIDE 4 MCG: 100 INJECTION, SOLUTION INTRAVENOUS at 14:28

## 2022-06-13 RX ADMIN — MUPIROCIN: 20 OINTMENT TOPICAL at 22:25

## 2022-06-13 RX ADMIN — ACETAMINOPHEN 650 MG: 325 TABLET ORAL at 16:25

## 2022-06-13 RX ADMIN — ONDANSETRON 4 MG: 2 INJECTION INTRAMUSCULAR; INTRAVENOUS at 12:59

## 2022-06-13 RX ADMIN — FENTANYL CITRATE 50 MCG: 50 INJECTION INTRAMUSCULAR; INTRAVENOUS at 12:59

## 2022-06-13 RX ADMIN — SODIUM CHLORIDE, SODIUM LACTATE, POTASSIUM CHLORIDE, AND CALCIUM CHLORIDE: .6; .31; .03; .02 INJECTION, SOLUTION INTRAVENOUS at 12:00

## 2022-06-13 RX ADMIN — KETAMINE HYDROCHLORIDE 50 MG: 50 INJECTION INTRAMUSCULAR; INTRAVENOUS at 13:47

## 2022-06-13 RX ADMIN — ROCURONIUM BROMIDE 25 MG: 10 SOLUTION INTRAVENOUS at 13:31

## 2022-06-13 RX ADMIN — HYDROMORPHONE HYDROCHLORIDE 0.2 MG: 2 INJECTION INTRAMUSCULAR; INTRAVENOUS; SUBCUTANEOUS at 14:38

## 2022-06-13 RX ADMIN — SUGAMMADEX 200 MG: 100 INJECTION, SOLUTION INTRAVENOUS at 14:33

## 2022-06-13 RX ADMIN — ROCURONIUM BROMIDE 50 MG: 10 SOLUTION INTRAVENOUS at 13:03

## 2022-06-13 RX ADMIN — DEXMEDETOMIDINE HYDROCHLORIDE 8 MCG: 100 INJECTION, SOLUTION INTRAVENOUS at 14:33

## 2022-06-13 ASSESSMENT — PAIN DESCRIPTION - DESCRIPTORS
DESCRIPTORS: NAGGING;SHARP

## 2022-06-13 ASSESSMENT — PAIN SCALES - GENERAL
PAINLEVEL_OUTOF10: 4
PAINLEVEL_OUTOF10: 6
PAINLEVEL_OUTOF10: 6
PAINLEVEL_OUTOF10: 8

## 2022-06-13 ASSESSMENT — PAIN DESCRIPTION - FREQUENCY
FREQUENCY: CONTINUOUS
FREQUENCY: CONTINUOUS

## 2022-06-13 ASSESSMENT — PAIN - FUNCTIONAL ASSESSMENT
PAIN_FUNCTIONAL_ASSESSMENT: ACTIVITIES ARE NOT PREVENTED
PAIN_FUNCTIONAL_ASSESSMENT: PREVENTS OR INTERFERES SOME ACTIVE ACTIVITIES AND ADLS
PAIN_FUNCTIONAL_ASSESSMENT: ACTIVITIES ARE NOT PREVENTED
PAIN_FUNCTIONAL_ASSESSMENT: NONE - DENIES PAIN

## 2022-06-13 ASSESSMENT — PAIN DESCRIPTION - PAIN TYPE
TYPE: SURGICAL PAIN
TYPE: SURGICAL PAIN

## 2022-06-13 ASSESSMENT — PAIN DESCRIPTION - ORIENTATION
ORIENTATION: RIGHT

## 2022-06-13 ASSESSMENT — PAIN DESCRIPTION - DIRECTION
RADIATING_TOWARDS: MID STERNAL
RADIATING_TOWARDS: MID STERNAL

## 2022-06-13 ASSESSMENT — PAIN DESCRIPTION - LOCATION
LOCATION: SHOULDER

## 2022-06-13 ASSESSMENT — PAIN DESCRIPTION - ONSET
ONSET: ON-GOING
ONSET: ON-GOING

## 2022-06-13 NOTE — BRIEF OP NOTE
Brief Postoperative Note      Patient: Fay Osuna  YOB: 1960  MRN: [id number]    Date of Procedure: 6/13/2022    Pre-Op Diagnosis: RIGHT LOWER LOBE LUNG NODULE    Post-Op Diagnosis: Same       Procedure(s):  RIGHT VIDEO ASSISTED THORACOSCOPIC SURGERY WITH WEDGE RESECTION  OF RIGHT LOWER LOBE WITH INTERCOSTAL NERVE BLOCK AND BRONCHOSCOPY    Surgeon(s):  Hamlet Chris MD    Assistant:  Physician Assistant: FRANCISCO Fuentes    Anesthesia: General    Estimated Blood Loss (mL): less than 50     Complications: None    Specimens:   ID Type Source Tests Collected by Time Destination   1 : RIGHT INTRA MASS CULTURE  Tissue Tissue CULTURE, FUNGUS, CULTURE, SURGICAL, CULTURE, TISSUE, CULTURE WITH SMEAR, ACID FAST BACILLIUS Hamlet Chris MD 6/13/2022 1411    A : RIGHT SUPERIOR SEGMENT WEDGE  Tissue Tissue SURGICAL PATHOLOGY Hamlet Chris MD 6/13/2022 1402        Implants:  * No implants in log *      Drains:   Chest Tube Right 1 (Active)       Urinary Catheter Lutz-Temperature (Active)       Findings:     Electronically signed by FRANCISCO Fuentes on 6/13/2022 at 3:03 PM

## 2022-06-13 NOTE — H&P
Consultation H&P           Date of Admission:  No admission date for patient encounter. Date of Consultation:  5/10/2022     PCP:  KIMBERLY Crain CNP    Referred             Chief Complaint: Hemoptysis     History of Present Illness: We are asked to see this patient in consultation by Dr. Trevon Mejias regarding pulm nodule. Komal Red is a 58 y.o. female who patients have hemoptysis why were admitted to the hospital referred to CT scan which showed lung nodule with fungal light growth. Referred to pulmonary which evaluate for 3 months referred for possible resection not able to rule out malignancy. Patient denies any history of recent travel or changing's. She had increased number of bird to 16 from 2. Past Medical History:  Past Medical History        Past Medical History:   Diagnosis Date    Cancer [facility])       cervical    Lung nodule              Past Surgical History:  Past Surgical History         Past Surgical History:   Procedure Laterality Date     SECTION        LEEP                Home Medications:   Home Medications   Prior to Admission medications    Medication Sig Start Date End Date Taking? Authorizing Provider   nystatin (MYCOSTATIN) 833105 UNIT/GM powder Apply 3 times daily. 22   Yes KIMBERLY Crain CNP            Facility Administered Medications:      Allergies:           Allergies   Allergen Reactions    Other         Sensitive to all medications - everything causes reaction         Social History:    Working:   Caffeine:   Lifestyle:    Social History               Socioeconomic History    Marital status:        Spouse name: Not on file    Number of children: Not on file    Years of education: Not on file    Highest education level: Not on file   Occupational History    Not on file   Tobacco Use    Smoking status: Never Smoker    Smokeless tobacco: Never Used   Vaping Use    Vaping Use: Never used   Substance and Sexual Activity    Alcohol use: Yes       Comment: occasional    Drug use: No    Sexual activity: Yes   Other Topics Concern    Not on file   Social History Narrative    Not on file      Social Determinants of Health          Financial Resource Strain:     Difficulty of Paying Living Expenses: Not on file   Food Insecurity:     Worried About Running Out of Food in the Last Year: Not on file    [place] of Food in the Last Year: Not on file   Transportation Needs:     Lack of Transportation (Medical): Not on file    Lack of Transportation (Non-Medical): Not on file   Physical Activity:     Days of Exercise per Week: Not on file    Minutes of Exercise per Session: Not on file   Stress:     Feeling of Stress : Not on file   Social Connections:     Frequency of Communication with Friends and Family: Not on file    Frequency of Social Gatherings with Friends and Family: Not on file    Attends Muslim Services: Not on file    Active Member of [address] [place] [address] or Organizations: Not on file    Attends Club or Organization Meetings: Not on file    Marital Status: Not on file   Intimate Partner Violence:     Fear of Current or Ex-Partner: Not on file    Emotionally Abused: Not on file    Physically Abused: Not on file    Sexually Abused: Not on file   Housing Stability:     Unable to Pay for Housing in the Last Year: Not on file    Number of [place] in the Last Year: Not on file    Unstable Housing in the Last Year: Not on file            Family History:  Heart Disease:   Stroke:   Cancer:   Diabetes:   Hypertension:   Aneurysm/PVD:      Family History             Problem Relation Age of Onset    Breast Cancer Mother      Heart Attack Father              Review of Systems:  Constitutional:  No night sweats, headaches, weight loss. Eyes:  No glaucoma, cataracts. ENMT:  No nosebleeds, deviated septum. + seasonal allergies  Cardiac:  No arrhythmias.   Vascular:  No claudication, varicosities. GI:  No PUD, + heartburn. :  No kidney stones, frequent UTIs  Musculoskeletal:  No gout. + arthritis  Respiratory:  No emphysema, asthma. + SOBOE, + RLL lung nodule  Integumentary:  No dermatitis, itching, rash. Neurological:  No stroke, TIAs, seizures. Psychiatric:  No depression, anxiety. Endocrine: No diabetes, thyroid issues. Hematologic:  No bleeding, easy bruising. Immunologic:  No steroid therapies. + h/o cervical cancer     Physical Examination:    /74 (Site: Left Upper Arm, Position: Sitting)   Pulse 80   Temp 98 °F (36.7 °C) (Temporal)   Ht 5' 4.5\" (1.638 m)   Wt 208 lb (94.3 kg)   LMP 02/27/2012   SpO2 98%   BMI 35.15 kg/m²      Admission Weight: 208 lb (94.3 kg)     General appearance: NAD, well nourished  Eyes: anicteric, PERRLA  ENMT: no scars or lesions, no nasal deformity, normal dentition, no cyanosis of oral mucosa  Neck: no masses, no thyroid enlargement, no JVD. Respiratory: effort is unlabored, symmetric, no crackles, wheezes or rubs. No palpable/percussable abnormalities. Cardiovascular: regular, no murmur. PMI normal, no thrill. No carotid bruits. No edema or varicosities. Abdominal aorta cannot be appreciated given body habitus. GI: abdomen soft, nondistended, no organomegaly. No masses. Lymphatic: no cervical/supraclavicular adenopathy  Musculoskeletal: strength and tone normal. Full ROM. No scoliosis. Extremities: warm and pink. No clubbing or petechiae. Skin: no dermatitis or ulceration. No nodularity or induration. Neuro: CN grossly intact. Sensation and motor function grossly intact. Psychiatric: oriented, appropriate mood/affect.        MEDICAL DECISION MAKING/TESTING  Studies personally reviewed.        Echo:      CXR:      CT  1.  Some slight interval improvement of diffuse ground-glass airspace   opacities as seen throughout both lungs, with residual opacities remaining,   most consistent with an improving atypical multifocal pneumonia. 2. No significant change in size or appearance of a 2.6 x 1.8 cm cavitary   nodule within the superior segment of the right lower lobe, with peripheral   small subcentimeter nodules remaining, also unchanged.  This could represent   a chronic mycetoma, a slow healing cavitary pneumonia, or possibly a   quiescent cavitary malignancy.  Consider further characterization with a   PET-CT or tissue sampling. 3. Additional stable subcentimeter solid and ground-glass nodules throughout   both lungs are most likely benign and infectious or inflammatory in etiology. 4. Stable mild mediastinal lymphadenopathy, most likely benign and reactive   in etiology.         PET/CT     PFT     Pathology        Labs:   CBC: No results for input(s): WBC, HGB, HCT, MCV, PLT in the last 72 hours. BMP: No results for input(s): NA, K, CL, CO2, PHOS, BUN, CREATININE, CALCIUM, MG in the last 72 hours. Cardiac Enzymes: No results for input(s): CKTOTAL, CKMB, CKMBINDEX, TROPONINI in the last 72 hours. PT/INR: No results for input(s): PROTIME, INR in the last 72 hours. APTT: No results for input(s): APTT in the last 72 hours.   Liver Profile:        Lab Results   Component Value Date     AST 15 12/23/2021     ALT 25 12/23/2021     BILIDIR <0.2 12/23/2021     BILITOT 0.3 12/23/2021     ALKPHOS 60 12/23/2021            Lab Results   Component Value Date     CHOL 239 08/21/2018     HDL 34 08/21/2018     TRIG 280 08/21/2018      UA:         Lab Results   Component Value Date     COLORU Yellow 12/17/2021     PHUR 5.5 12/17/2021     WBCUA 0-2 12/17/2021     RBCUA 3-4 12/17/2021     MUCUS 2+ 12/17/2021     BACTERIA 3+ 12/17/2021     CLARITYU SL CLOUDY 12/17/2021     SPECGRAV 1.020 12/17/2021     LEUKOCYTESUR Negative 12/17/2021     UROBILINOGEN 1.0 12/17/2021     BILIRUBINUR SMALL 12/17/2021     BLOODU Negative 12/17/2021     GLUCOSEU Negative 12/17/2021         History obtained: chart, pt     Risk factors:      Diagnosis: Right lower lobe lung nodule     Plan:      Persistent lung nodule with fungal light growth in the middle. As discussed with the patient I recommend right lower lobe superior segmentectomy came to be cancer we will proceed with lobectomy right lower lobe with mediastinal lymphadenectomy.        Typical periop/postop course reviewed including initial limitations on driving/heavy lifting. Risks, benefits and postoperative complications discussed including bleeding, infection, stroke, death, postop pulmonary and renal issues. I spent 60 minutes of care and visit with this patient, greater than 50% of time was spent in counseling and coordinating care, image interpretation, discussion with other caregiver. And future planning     Woody Gonzalez MD FACS    Morning of Surgery:  Patient was seen & examined this morning. Imaging was reviewed. History and physical was reviewed. No new event or complaint since seen last.   I will proceed with the previously mentioned plan.     Anna Smalls PA-C

## 2022-06-13 NOTE — ANESTHESIA PRE PROCEDURE
Department of Anesthesiology  Preprocedure Note       Name:  Ruddy Wetzel   Age:  58 y.o.  :  1960                                          MRN:  [id number]         Date:  2022      Surgeon: Lin Crum):  Geovany Bailey MD    Procedure: Procedure(s):  RIGHT VIDEO ASSISTED THORACOSCOPIC SURGERY WITH SEGMENTECTOMY OF RIGHT LOWER LOBE NODULE WITH MEDIASTINAL LYMPHADENECTOMY, INTERCOSTAL NERVE BLOCK AND BRONCHOSCOPY    Medications prior to admission:   Prior to Admission medications    Medication Sig Start Date End Date Taking? Authorizing Provider   acetaminophen (TYLENOL) 325 mg tablet Take 650 mg by mouth every 6 hours as needed for Pain   Yes Historical Provider, MD   amiodarone (CORDARONE) 200 MG tablet Take 1st dose AM , take last dose PM . 5/10/22   Geovany Bailey MD   nystatin (MYCOSTATIN) 008373 UNIT/GM powder Apply 3 times daily. Patient taking differently: Apply topically 3 times daily as needed Apply 3 times daily. 22   Cynthia Bee, APRN - CNP       Current medications:    Current Facility-Administered Medications   Medication Dose Route Frequency Provider Last Rate Last Admin    ceFAZolin (ANCEF) 2000 mg in dextrose 5 % 100 mL IVPB  2,000 mg IntraVENous On Call to [address] [place] [address], MD        lactated ringers infusion   IntraVENous Continuous Ashley Tillman MD        lidocaine 1 % (PF) injection 0.1 mL  0.1 mL IntraDERmal Once PRN Ashley Tillman MD           Allergies: Allergies   Allergen Reactions    Other      Sensitive to all medications - everything causes reaction       Problem List:    Patient Active Problem List   Diagnosis Code    Acute respiratory failure with hypoxia (HCC) J96.01    Pneumonia due to COVID-19 virus U07.1, J12.82    2019 novel coronavirus-infected pneumonia (NCIP) U07.1, J12.82    History of prediabetes Z87.898    Obesity (BMI 30-39. 9) E66.9    Pneumonia with cavity of lung J18.9, J98.4    Hypoxemia R09.02    Personal history of COVID-19 Z86.16    Pulmonary infiltrates R91.8    Mediastinal adenopathy R59.0       Past Medical History:        Diagnosis Date    Cancer ([address]     cervical    Lung nodule     RLL    Pneumonia 2021    uses O2 2L/NC at night    Wears partial dentures        Past Surgical History:        Procedure Laterality Date     SECTION      x2 - \"emergency\"    COLONOSCOPY      LEEP      x2       Social History:    Social History     Tobacco Use    Smoking status: Never Smoker    Smokeless tobacco: Never Used   Substance Use Topics    Alcohol use: Yes     Comment: occasional                                Counseling given: Not Answered      Vital Signs (Current):   Vitals:    22 1044 22 1107   BP:  133/71   Pulse:  69   Resp:  18   Temp:  98.6 °F (37 °C)   TempSrc:  Tympanic   SpO2:  96%   Weight: 206 lb (93.4 kg)    Height: 5' 4.5\" (1.638 m)                                               BP Readings from Last 3 Encounters:   22 133/71   22 114/70   05/10/22 138/74       NPO Status: Time of last liquid consumption:                         Time of last solid consumption:                         Date of last liquid consumption: 22                        Date of last solid food consumption: 22    BMI:   Wt Readings from Last 3 Encounters:   22 206 lb (93.4 kg)   22 209 lb (94.8 kg)   05/10/22 208 lb (94.3 kg)     Body mass index is 34.81 kg/m².     CBC:   Lab Results   Component Value Date    WBC 7.0 2022    RBC 4.75 2022    HGB 13.5 2022    HCT 40.1 2022    MCV 84.5 2022    RDW 13.7 2022     2022       CMP:   Lab Results   Component Value Date     2022    K 3.7 2022    K 4.1 2021     2022    CO2 21 2022    BUN 12 2022    CREATININE 0.6 2022    GFRAA >60 2022    GFRAA >60 2013    AGRATIO 0.7 2021    LABGLOM >60 06/01/2022    GLUCOSE 97 06/01/2022    PROT 6.4 12/23/2021    PROT 6.9 02/27/2013    CALCIUM 9.5 06/01/2022    BILITOT 0.3 12/23/2021    ALKPHOS 60 12/23/2021    AST 15 12/23/2021    ALT 25 12/23/2021       POC Tests: No results for input(s): POCGLU, POCNA, POCK, POCCL, POCBUN, POCHEMO, POCHCT in the last 72 hours. Coags:   Lab Results   Component Value Date    PROTIME 13.0 06/01/2022    INR 1.00 06/01/2022    APTT 27.8 06/01/2022       HCG (If Applicable): No results found for: PREGTESTUR, PREGSERUM, HCG, HCGQUANT     ABGs: No results found for: PHART, PO2ART, EMU0KJC, HZX7AZK, BEART, H4BJECTO     Type & Screen (If Applicable):  No results found for: LABABO, LABRH    Drug/Infectious Status (If Applicable):  No results found for: HIV, HEPCAB    COVID-19 Screening (If Applicable):   Lab Results   Component Value Date    COVID19 DETECTED 12/16/2021           Anesthesia Evaluation    Airway: Mallampati: II  TM distance: <3 FB   Neck ROM: full  Mouth opening: > = 3 FB   Dental: normal exam         Pulmonary:   (+) pneumonia:                            ROS comment: H/o covid, lung nodule, hypoxia during covid. Now off the n/c O2. O2 sat 93 to 97% per . Cardiovascular:Negative CV ROS                      Neuro/Psych:   Negative Neuro/Psych ROS              GI/Hepatic/Renal: Neg GI/Hepatic/Renal ROS            Endo/Other:    (+) malignancy/cancer. Abdominal:             Vascular: negative vascular ROS. Other Findings: Large upper incisors, overbite          Anesthesia Plan      general     ASA 3     (Pt agrees to risks, benefits and alternatives of BLADIMIR ALVAREZ. Questions answered. Willing to proceed with plan.)  Induction: intravenous. Anesthetic plan and risks discussed with patient and spouse.                         Salvatore Truong MD   6/13/2022

## 2022-06-14 ENCOUNTER — APPOINTMENT (OUTPATIENT)
Dept: GENERAL RADIOLOGY | Age: 62
DRG: 120 | End: 2022-06-14
Attending: THORACIC SURGERY (CARDIOTHORACIC VASCULAR SURGERY)
Payer: MEDICAID

## 2022-06-14 VITALS
BODY MASS INDEX: 33.57 KG/M2 | HEIGHT: 65 IN | HEART RATE: 76 BPM | SYSTOLIC BLOOD PRESSURE: 128 MMHG | DIASTOLIC BLOOD PRESSURE: 57 MMHG | OXYGEN SATURATION: 94 % | TEMPERATURE: 98.6 F | WEIGHT: 201.5 LBS | RESPIRATION RATE: 18 BRPM

## 2022-06-14 LAB
ANION GAP SERPL CALCULATED.3IONS-SCNC: 13 MMOL/L (ref 3–16)
BUN BLDV-MCNC: 14 MG/DL (ref 7–20)
CALCIUM SERPL-MCNC: 9.8 MG/DL (ref 8.3–10.6)
CHLORIDE BLD-SCNC: 101 MMOL/L (ref 99–110)
CO2: 24 MMOL/L (ref 21–32)
CREAT SERPL-MCNC: 0.6 MG/DL (ref 0.6–1.2)
GFR AFRICAN AMERICAN: >60
GFR NON-AFRICAN AMERICAN: >60
GLUCOSE BLD-MCNC: 176 MG/DL (ref 70–99)
HCT VFR BLD CALC: 43.2 % (ref 36–48)
HEMOGLOBIN: 14.2 G/DL (ref 12–16)
MCH RBC QN AUTO: 28.1 PG (ref 26–34)
MCHC RBC AUTO-ENTMCNC: 32.9 G/DL (ref 31–36)
MCV RBC AUTO: 85.4 FL (ref 80–100)
PDW BLD-RTO: 13.9 % (ref 12.4–15.4)
PLATELET # BLD: 172 K/UL (ref 135–450)
PMV BLD AUTO: 8.5 FL (ref 5–10.5)
POTASSIUM SERPL-SCNC: 4.7 MMOL/L (ref 3.5–5.1)
RBC # BLD: 5.06 M/UL (ref 4–5.2)
SODIUM BLD-SCNC: 138 MMOL/L (ref 136–145)
WBC # BLD: 10.6 K/UL (ref 4–11)

## 2022-06-14 PROCEDURE — 71045 X-RAY EXAM CHEST 1 VIEW: CPT

## 2022-06-14 PROCEDURE — 80048 BASIC METABOLIC PNL TOTAL CA: CPT

## 2022-06-14 PROCEDURE — 6370000000 HC RX 637 (ALT 250 FOR IP): Performed by: NURSE PRACTITIONER

## 2022-06-14 PROCEDURE — 6370000000 HC RX 637 (ALT 250 FOR IP)

## 2022-06-14 PROCEDURE — 36415 COLL VENOUS BLD VENIPUNCTURE: CPT

## 2022-06-14 PROCEDURE — 85027 COMPLETE CBC AUTOMATED: CPT

## 2022-06-14 PROCEDURE — 2580000003 HC RX 258

## 2022-06-14 PROCEDURE — 99024 POSTOP FOLLOW-UP VISIT: CPT | Performed by: NURSE PRACTITIONER

## 2022-06-14 RX ORDER — TRAMADOL HYDROCHLORIDE 50 MG/1
50 TABLET ORAL EVERY 6 HOURS PRN
Status: DISCONTINUED | OUTPATIENT
Start: 2022-06-14 | End: 2022-06-14 | Stop reason: HOSPADM

## 2022-06-14 RX ORDER — TRAMADOL HYDROCHLORIDE 50 MG/1
100 TABLET ORAL EVERY 6 HOURS PRN
Status: DISCONTINUED | OUTPATIENT
Start: 2022-06-14 | End: 2022-06-14 | Stop reason: HOSPADM

## 2022-06-14 RX ORDER — TRAMADOL HYDROCHLORIDE 50 MG/1
50 TABLET ORAL EVERY 6 HOURS PRN
Qty: 28 TABLET | Refills: 0 | Status: SHIPPED | OUTPATIENT
Start: 2022-06-14 | End: 2022-06-21

## 2022-06-14 RX ADMIN — MUPIROCIN: 20 OINTMENT TOPICAL at 10:58

## 2022-06-14 RX ADMIN — ACETAMINOPHEN 650 MG: 325 TABLET ORAL at 01:41

## 2022-06-14 RX ADMIN — SODIUM CHLORIDE, PRESERVATIVE FREE 10 ML: 5 INJECTION INTRAVENOUS at 10:58

## 2022-06-14 RX ADMIN — TRAMADOL HYDROCHLORIDE 50 MG: 50 TABLET, COATED ORAL at 13:10

## 2022-06-14 ASSESSMENT — PAIN DESCRIPTION - ONSET
ONSET: ON-GOING

## 2022-06-14 ASSESSMENT — PAIN DESCRIPTION - ORIENTATION
ORIENTATION: RIGHT

## 2022-06-14 ASSESSMENT — PAIN SCALES - GENERAL
PAINLEVEL_OUTOF10: 0
PAINLEVEL_OUTOF10: 5
PAINLEVEL_OUTOF10: 0
PAINLEVEL_OUTOF10: 5
PAINLEVEL_OUTOF10: 0
PAINLEVEL_OUTOF10: 5

## 2022-06-14 ASSESSMENT — PAIN DESCRIPTION - DESCRIPTORS
DESCRIPTORS: NAGGING;SHARP

## 2022-06-14 ASSESSMENT — PAIN DESCRIPTION - PAIN TYPE
TYPE: SURGICAL PAIN

## 2022-06-14 ASSESSMENT — PAIN DESCRIPTION - LOCATION
LOCATION: SHOULDER

## 2022-06-14 ASSESSMENT — PAIN - FUNCTIONAL ASSESSMENT
PAIN_FUNCTIONAL_ASSESSMENT: ACTIVITIES ARE NOT PREVENTED
PAIN_FUNCTIONAL_ASSESSMENT: ACTIVITIES ARE NOT PREVENTED

## 2022-06-14 ASSESSMENT — PAIN DESCRIPTION - FREQUENCY
FREQUENCY: CONTINUOUS

## 2022-06-14 NOTE — PROGRESS NOTES
Post chest tube removal chest Xray viewed by CT surgery.  Patient is cleared by CT surgery for discharge

## 2022-06-14 NOTE — CARE COORDINATION
CELI spoke with KIMBERLY Li CNP with [facility], plans of chest tube removal and discharge home later today with home care. CELI acknowledged. Referral sent to [facility] home care awaiting acceptance.  Lord Jeovany RN

## 2022-06-14 NOTE — CARE COORDINATION
CASE MANAGEMENT DISCHARGE SUMMARY      Discharge to: Home with spouse    Precertification completed: [facility] Exemption Notification (HENS) completed: needed for SNF    IMM given: N/A>Medicaid    New Durable Medical Equipment ordered/agency:     Transportation:    Family/car: spouse       Confirmed discharge plan with:     Patient: yes     Family:  yes    Name: Patricia Krishnan (Spouse)   Contact number:26-       RN, name: Maxim Zimmer RN CM spoke with patient at bedside and discussed home care option for discharge. Patient stated that her spouse can help with the dressings and she does not need home care. CM acknowledged. Laurel Vera, KENA      Note: Discharging nurse to complete DORETHA, reconcile AVS, and place final copy with patient's discharge packet. RN to ensure that written prescriptions for  Level II medications are sent with patient to the facility as per protocol.

## 2022-06-14 NOTE — CARE COORDINATION
CM spoke with Jose Jones from [facility] and they denied d/t no nursing in patients area.  Mitchell Michael RN

## 2022-06-14 NOTE — DISCHARGE INSTR - COC
Continuity of Care Form    Patient Name: Aleksandr Emmanuel   :  1960  MRN:  [id number]    Admit date:  2022  Discharge date:  2022    Code Status Order: Full Code   Advance Directives:   [facility] Documentation       Date/Time Healthcare Directive Type of Healthcare Directive Copy in [facility] [address] Agent's Name Healthcare Agent's Phone Number    22 1113 No, patient does not have an advance directive for healthcare treatment -- -- -- -- --            Admitting Physician:  Reuben Lloyd MD  PCP: KIMBERLY Flowers CNP    Discharging Nurse: [facility] Unit/Room#: 5555/1410-16  Discharging Unit Phone Number: ***    Emergency Contact:   Extended Emergency Contact Information  Primary Emergency Contact: Paulamark Klever  Address: [address] [place] [address] Phone: [phone]  Mobile Phone: [phone]  Relation: Spouse  Secondary Emergency Contact: Marycarmen Taveras   [address] Phone: [phone]  Relation: Brother/Sister    Past Surgical History:  Past Surgical History:   Procedure Laterality Date     SECTION      x2 - \"emergency\"    COLONOSCOPY      LEEP      x2    THORACOSCOPY Right 2022    RIGHT VIDEO ASSISTED THORACOSCOPIC SURGERY WITH WEDGE RESECTION  OF RIGHT LOWER LOBE WITH INTERCOSTAL NERVE BLOCK AND BRONCHOSCOPY performed by Reuben Lloyd MD at [address]       Immunization History:   Immunization History   Administered Date(s) Administered    Tdap (Boostrix, Adacel) 06/15/2011       Active Problems:  Patient Active Problem List   Diagnosis Code    Acute respiratory failure with hypoxia ([facility] [address] J96.01    Pneumonia due to COVID-19 virus U07.1, J12.82    2019 novel coronavirus-infected pneumonia (NCIP) U07.1, J12.82    History of prediabetes Z87.898    Obesity (BMI 30-39. 9) E66.9    Pneumonia with cavity of lung J18.9, J98.4    Hypoxemia R09.02 Personal history of COVID-19 Z86.16    Pulmonary infiltrates R91.8    Mediastinal adenopathy R59.0    Lung nodule R91.1       Isolation/Infection:   Isolation            No Isolation          Patient Infection Status       Infection Onset Added Last Indicated Last Indicated By Review Planned Expiration Resolved Resolved By    None active    Resolved    COVID-19 21 COVID-19, Rapid   21     COVID-19 (Rule Out) 21 COVID-19, Rapid (Ordered)   21 Rule-Out Test Resulted            Nurse Assessment:  Last Vital Signs: /74   Pulse 76   Temp 98 °F (36.7 °C) (Oral)   Resp 20   Ht 5' 4.5\" (1.638 m)   Wt 201 lb 8 oz (91.4 kg)   LMP 2012   SpO2 93%   BMI 34.05 kg/m²     Last documented pain score (0-10 scale): Pain Level: 0  Last Weight:   Wt Readings from Last 1 Encounters:   22 201 lb 8 oz (91.4 kg)     Mental Status:  {IP PT MENTAL STATUS:}    IV Access:  { DORETHA IV ACCESS:042308409}    Nursing Mobility/ADLs:  Walking   {P DME GKJD:422813084}  Transfer  {P DME GEZR:187341781}  Bathing  {P DME WTHT:976276195}  Dressing  {P DME YDOD:268945189}  Toileting  {P DME OPDU:441528229}  Feeding  {[facility] DME XTYY:155360409}  Med Admin  {[facility] DME ZOKO:438934423}  Med Delivery   { DORETHA MED Delivery:350279768}    Wound Care Documentation and Therapy:  Incision 22 Back Lateral;Right (Active)   Dressing/Treatment Open to air 22 0830   Closure Surgical glue 22   Incision Assessment Dry 22 0830   Drainage Amount None 22 0830   Odor None 22 0830   Number of days: 0       Incision 22 Chest Lateral;Right;Upper (Active)   Dressing/Treatment Open to air 22 0830   Number of days: 0        Elimination:  Continence:    Bowel: {YES / SB:66153}  Bladder: {YES / ZO:78084}  Urinary Catheter: {Urinary Catheter:923915856}   Colostomy/Ileostomy/Ileal Conduit: {YES / NW:16047}       Date of Last BM: ***    Intake/Output Summary (Last 24 hours) at 2022 1259  Last data filed at 2022 0600  Gross per 24 hour   Intake 820 ml   Output 1033 ml   Net -213 ml     I/O last 3 completed shifts: In: 36 [P.O.:90; I.V.:730]  Out: 18 [Urine:900; Blood:50; Chest Tube:83]    Safety Concerns:     [facility] [employer] Safety Concerns:541607737}    Impairments/Disabilities:      [facility] Impairments/Disabilities:877573359}    Nutrition Therapy:  Current Nutrition Therapy:   [facility] Diet List:024201359}    Routes of Feeding: {P DME Other Feedings:756882631}  Liquids: {Slp liquid thickness:00135}  Daily Fluid Restriction: {CHP DME Yes amt example:143995492}  Last Modified Barium Swallow with Video (Video Swallowing Test): {Done Not Done CJGP:840117860}    Treatments at the Time of Hospital Discharge:   Respiratory Treatments: ***  Oxygen Therapy:  {Therapy; copd oxygen:60119}  Ventilator:    { CC Vent UH:076276225}    Rehab Therapies: {THERAPEUTIC INTERVENTION:9623016031}  Weight Bearing Status/Restrictions: [address] [place] [address] Weight Bearin}  Other Medical Equipment (for information only, NOT a DME order):  {EQUIPMENT:714975219}  Other Treatments: ***    Patient's personal belongings (please select all that are sent with patient):  {[facility] DME Belongings:096587286}    RN SIGNATURE:  {Esignature:570738378}    CASE MANAGEMENT/SOCIAL WORK SECTION    Inpatient Status Date: 2022    Readmission Risk Assessment Score:  Readmission Risk              Risk of Unplanned Readmission:  6       Discharging to Facility/ Agency     Now Healthcare>pending at this time.       / signature: Electronically signed by Germán Mcfarlane RN on 22 at 3:49 PM EDT    PHYSICIAN SECTION    Prognosis: Good    Condition at Discharge: Stable    Rehab Potential (if transferring to Rehab): {Prognosis:4275970802}    Recommended Labs or Other Treatments After Discharge: cbc, bmp and cxr prior to follow up appointment    Physician Certification: I certify the above information and transfer of Mya Delvalle  is necessary for the continuing treatment of the diagnosis listed and that she requires [address] for less 30 days.      Update Admission H&P: No change in H&P    PHYSICIAN SIGNATURE:  Electronically signed by KIMBERLY Ayon CNP on 6/14/22 at 12:59 PM EDT

## 2022-06-14 NOTE — DISCHARGE SUMMARY
Cardiac, Vascular & Thoracic Surgery  Discharge Summary    Patient:  Eugene Beltrán 1960 [id number]   Admission Date:  6/13/2022 10:28 AM  Discharge Date: 6/14/22      Principle Diagnosis:  Lung nodule    Secondary Diagnosis:  Principal Problem:    Lung nodule  Resolved Problems:    * No resolved hospital problems. *    CT Chest: 5/5/22   Impression   1. Some slight interval improvement of diffuse ground-glass airspace   opacities as seen throughout both lungs, with residual opacities remaining,   most consistent with an improving atypical multifocal pneumonia. 2. No significant change in size or appearance of a 2.6 x 1.8 cm cavitary   nodule within the superior segment of the right lower lobe, with peripheral   small subcentimeter nodules remaining, also unchanged.  This could represent   a chronic mycetoma, a slow healing cavitary pneumonia, or possibly a   quiescent cavitary malignancy.  Consider further characterization with a   PET-CT or tissue sampling. 3. Additional stable subcentimeter solid and ground-glass nodules throughout   both lungs are most likely benign and infectious or inflammatory in etiology. 4. Stable mild mediastinal lymphadenopathy, most likely benign and reactive   in etiology. Procedure:  6/13/22 RIGHT VIDEO ASSISTED THORACOSCOPIC SURGERY WITH WEDGE RESECTION  OF RIGHT LOWER LOBE WITH INTERCOSTAL NERVE BLOCK AND BRONCHOSCOPY      History:  The patient is a 58 y.o. female We are asked to see this patient in consultation by Dr. Bushra Perez regarding pulm nodule. Derek Cho a 58 y. o. female patient that had hemoptysis while admitted to the hospital, referred to CT scan which showed lung nodule with light fungal growth.  Referred to pulmonary which evaluate for 3 months referred for possible resection not able to rule out malignancy. Patient denies any history of recent travel or changing's. She had increased number of bird to 16 from 2. Hospital Course:   The post operative period for this patient was uneventful. The patient was discharged on 6/14/22 and will follow up in the office on 6/21/22. Discharged Condition: stable    Disposition:  Home with [facility] and family. Discharge Medications:     Medication List      START taking these medications    traMADol 50 MG tablet  Commonly known as: ULTRAM  Take 1 tablet by mouth every 6 hours as needed (acute post op pain) for up to 7 days. CHANGE how you take these medications    nystatin [id number] UNIT/GM powder  Commonly known as: MYCOSTATIN  Apply 3 times daily. What changed:   · how to take this  · when to take this  · reasons to take this        CONTINUE taking these medications    acetaminophen 325 MG tablet  Commonly known as: TYLENOL        STOP taking these medications    amiodarone 200 MG tablet  Commonly known as: CORDARONE           Where to Get Your Medications      You can get these medications from any pharmacy    Bring a paper prescription for each of these medications  · traMADol 50 MG tablet          Patient Instructions: Activity: DO NOT LIFT, PUSH, OR PULL ANYTHING OVER 5 POUNDS FOR 6 WEEK from the day of surgery  Diet:  regular diet  Wound Care:  [address] YOUR INCISIONS DAILY WITH A CLEAN WASHCLOTH AND ANTIBACTERIAL SOAP. Do not wash your incisions after you have cleansed other parts of your body    Follow up with Cardiothoracic PAMariela on 6/21/22 at 10:30 am.   Follow up with Pulmonologist in 1-2 weeks.       KIMBERLY Godfrey - CNP

## 2022-06-14 NOTE — PROGRESS NOTES
CVTS Thoracic Progress Note:          CC:  Post op follow up 6/13/22 RIGHT VIDEO ASSISTED THORACOSCOPIC SURGERY WITH WEDGE RESECTION  OF RIGHT LOWER LOBE WITH INTERCOSTAL NERVE BLOCK AND BRONCHOSCOPY     Subj: awake, sitting up in chair, in NAD. Saturating 95% on RA. Obj:    Blood pressure 139/66, pulse 70, temperature 97.7 °F (36.5 °C), temperature source Oral, resp. rate 16, height 5' 4.5\" (1.638 m), weight 201 lb 8 oz (91.4 kg), last menstrual period 02/27/2012, SpO2 95 %. Lungs CTAB   Abdomen soft, non-tender   Incision w/ occlusive dressing, CDI. Laparoscopic spots CDI, surgical glue intact. Chest tube with 58-25= 83 ml serosanguinous drainage in last 24 hours/no airleak    Diagnostics:   Recent Labs     06/14/22  0421   WBC 10.6   HGB 14.2   HCT 43.2                                                                     Recent Labs     06/14/22  0421      K 4.7      CO2 24   BUN 14   CREATININE 0.6   GLUCOSE 176*       CXR: 6/13/22 at 15:07  Impression   Right chest tube in good position.  Tiny right apical pneumothorax. CXR: 6/14/22 at 06:06   Impression   1. Resolved right apical pneumothorax. Surgical path: pending     Assess/Plan:   RLL Lung nodule: s/p wedge resection   No air leak in chest tube, minimal drainage. Will d/c this morning. CXR 2 hours afterward. If stable will d/c pt home with Georgie Ramos and her . Follow up in the office on 6/21/22 at 10:30 am.   All questions answered.    Surgical pathology to be discussed at follow up appointment.   ______________________________________________________    KIMBERLY Pennington - CNP  6/14/2022  10:21 AM

## 2022-06-14 NOTE — CARE COORDINATION
[facility]    Referral received from  to follow for home care services.    [facility] unable to accept    Patient has no preference in agency    Referral sent to [facility] care awaiting response    [facility] declined d/t staffing    Referral sent to [facility] unable to staff    Referral sent to damaris martinez    Referral sent to [facility] unable to accept more [facility] at this time    Referral sent to [facility] [phone] fax sent for review    Mando Cordova RN, BSN CTN  [facility] [phone]

## 2022-06-16 ENCOUNTER — TELEPHONE (OUTPATIENT)
Dept: CARDIOTHORACIC SURGERY | Age: 62
End: 2022-06-16

## 2022-06-16 NOTE — TELEPHONE ENCOUNTER
Ms. Elias called in to review incision care. She sent in an image of her previous chest tube site as she is having some tenderness. Will attach images below. She plans to remove the guaze over one of the scope sites. She was instructed to keep the current dressing on for 48 hours. I informed patient to ensure she washes the incisions with antibacterial soap & water, then pat to dry. I informed her to keep the incisions open to air unless they begin to drain. Patient denies fever, chills, SOB. Informed her to keep an eye on incisions and call with any other changes or questions. We reviewed s/s of infection to look out for. Patient verbalized understanding.

## 2022-06-18 LAB
ANAEROBIC CULTURE: NORMAL
CULTURE SURGICAL: NORMAL
GRAM STAIN RESULT: NORMAL

## 2022-06-20 NOTE — OP NOTE
Operative Note      Patient: Ike Briseno  YOB: 1960  MRN: [id number]    Date of Procedure: 6/13/2022    Pre-Op Diagnosis: RIGHT LOWER LOBE LUNG NODULE    Post-Op Diagnosis: Same       Procedure(s):  RIGHT VIDEO ASSISTED THORACOSCOPIC SURGERY WITH WEDGE RESECTION  OF RIGHT LOWER LOBE WITH INTERCOSTAL NERVE BLOCK AND BRONCHOSCOPY    Surgeon(s):  Eddie Rodgers MD    Assistant:   Surgical Assistant: Omer Edgar  Physician Assistant: FRANCISCO Olivo PA was present during the entire procedure for surgeons first assist due to the complexity of the procedure. Anesthesia: General    Estimated Blood Loss (mL): Minimal    Complications: None    Specimens:   ID Type Source Tests Collected by Time Destination   1 : RIGHT INTRA MASS CULTURE  Tissue Tissue CULTURE, FUNGUS, CULTURE, SURGICAL, CULTURE, TISSUE (Canceled), CULTURE WITH SMEAR, ACID FAST BACILLIUS Brandon Avila MD 6/13/2022 1411    A : RIGHT SUPERIOR SEGMENT WEDGE  Tissue Tissue SURGICAL PATHOLOGY Eddie Rodgers MD 6/13/2022 1402            Findings: Right superior segment mass which resection pathology no cancer    Detailed Description of Procedure:     Patient was taken to the operating room after informed consent was obtained lying supine under general anesthesia ET tube was placed with no difficulty bronchoscopy was performed double-lumen tube was confirmed no endobronchial mass or lesion could be identified  Patient was turned right side up prepped and draped in standard fashion pause for the course was done in standard manner. 4 port was inserted in standard fashion. On inspection there is no metastatic disease mass was identified. Wedge resection using a 60 purple Endo RODNEY stapler x3 was performed after mobilization of the fissure. Specimen was sent for pathology. And culture. Frozen section showed no cancer could be seen. More of noncaseating granuloma.   Copious amount of irrigation was done intercostal nerve block using an Exparel Marcaine and 7 spaces starting from the third all the way down to the 10th was performed chest tube was placed fixed through one of the port.   The rest of the lung was insufflated under direct vision good positions rest of the port site was closed patient was dispositioned to recovery room stable condition without no complication counts were told was correct x2    Electronically signed by Roberto Mason MD on 6/20/2022 at 12:57 PM

## 2022-06-28 ENCOUNTER — OFFICE VISIT (OUTPATIENT)
Dept: CARDIOTHORACIC SURGERY | Age: 62
End: 2022-06-28

## 2022-06-28 VITALS
OXYGEN SATURATION: 98 % | DIASTOLIC BLOOD PRESSURE: 82 MMHG | SYSTOLIC BLOOD PRESSURE: 128 MMHG | HEIGHT: 65 IN | BODY MASS INDEX: 34.45 KG/M2 | WEIGHT: 206.8 LBS | HEART RATE: 77 BPM | TEMPERATURE: 97.2 F

## 2022-06-28 DIAGNOSIS — M31.30 NECROTIZING GRANULOMATOUS INFLAMMATION OF LUNG (HCC): Primary | ICD-10-CM

## 2022-06-28 PROCEDURE — 99024 POSTOP FOLLOW-UP VISIT: CPT | Performed by: THORACIC SURGERY (CARDIOTHORACIC VASCULAR SURGERY)

## 2022-06-28 RX ORDER — TRAMADOL HYDROCHLORIDE 50 MG/1
50 TABLET ORAL EVERY 6 HOURS PRN
COMMUNITY
End: 2022-08-09

## 2022-06-28 NOTE — PROGRESS NOTES
Cardiac, Vascular and Thoracic Surgeons  Clinic Note     6/28/2022 10:24 AM  Surgeon:  Farshad Gillis     S/P :  1st post-op s/p RVATS w/ wedge resection of RLL w/ ICNB & bronchoscopy on 06/13/22 w/ MOM. Chief complaint : 1st post-op  Subjective:  Ms. Varsha Hope is doing well post-operatively. Pain sometimes occurs more intensely at night. She is taking tramadol during the day, tylenol at night. Tylenol makes patient tired. All right side incisions are healing very well, no redness or drainage. Patient keeps a band-aid on one site because her clothes rubbing against it make it sensitive. No SOB. She is using IS. She is not coughing very much. Vital Signs: Pulse 77   Temp 97.2 °F (36.2 °C) (Temporal)   Ht 5' 4.5\" (1.638 m)   Wt 206 lb 12.8 oz (93.8 kg)   LMP 02/27/2012   SpO2 98%   BMI 34.95 kg/m²      I/O:  No intake or output data in the 24 hours ending 06/28/22 1024    Exam:   Cardiovascular: S1 plus S2 +0  Pulmonary: Bilaterally clear no additional sound  Incision is dry and clean    FINAL DIAGNOSIS:     A.  Right lower lobe superior segment lung nodule, wedge resection:   - Necrotizing granulomatous inflammation.   - Negative for malignancy. - [place] methenamine silver stain is positive for rare fungal elements   consistent with Histoplasma capsulatum. - Ziehl-Neelsen stain is negative for acid-fast bacilli. COMMENT:    Correlation with the results of the pending microbiology   cultures is recommended.     BUCCA/BUCCA       Labs:   CBC: No results for input(s): WBC, HGB, HCT, MCV, PLT in the last 72 hours. BMP: No results for input(s): NA, K, CL, CO2, PHOS, BUN, CREATININE, CA in the last 72 hours. PT/INR: No results for input(s): PROTIME, INR in the last 72 hours. APTT: No results for input(s): APTT in the last 72 hours.     Scheduled Meds:     Patient Active Problem List   Diagnosis    Acute respiratory failure with hypoxia (HCC)    Pneumonia due to COVID-19 virus    2019 novel coronavirus-infected pneumonia (NCIP)    History of prediabetes    Obesity (BMI 30-39. 9)    Pneumonia with cavity of lung    Hypoxemia    Personal history of COVID-19    Pulmonary infiltrates    Mediastinal adenopathy    Lung nodule    Acute post-operative pain       Assessment/Plan:   1. Follow-up with infectious disease  2.  No need for further follow-up refer back to pulmonary    Saige Gray MD, FACS

## 2022-06-29 ENCOUNTER — OFFICE VISIT (OUTPATIENT)
Dept: PULMONOLOGY | Age: 62
End: 2022-06-29
Payer: MEDICAID

## 2022-06-29 VITALS
SYSTOLIC BLOOD PRESSURE: 151 MMHG | RESPIRATION RATE: 16 BRPM | BODY MASS INDEX: 34.66 KG/M2 | DIASTOLIC BLOOD PRESSURE: 83 MMHG | OXYGEN SATURATION: 97 % | TEMPERATURE: 97.2 F | WEIGHT: 208 LBS | HEIGHT: 65 IN | HEART RATE: 67 BPM

## 2022-06-29 DIAGNOSIS — B39.9 HISTOPLASMOSIS: ICD-10-CM

## 2022-06-29 DIAGNOSIS — R59.0 MEDIASTINAL ADENOPATHY: ICD-10-CM

## 2022-06-29 DIAGNOSIS — R09.02 HYPOXEMIA: ICD-10-CM

## 2022-06-29 DIAGNOSIS — Z86.16 PERSONAL HISTORY OF COVID-19: ICD-10-CM

## 2022-06-29 DIAGNOSIS — E66.9 OBESITY (BMI 30-39.9): ICD-10-CM

## 2022-06-29 DIAGNOSIS — R91.1 LUNG NODULE: Primary | ICD-10-CM

## 2022-06-29 PROCEDURE — G8417 CALC BMI ABV UP PARAM F/U: HCPCS | Performed by: INTERNAL MEDICINE

## 2022-06-29 PROCEDURE — 1036F TOBACCO NON-USER: CPT | Performed by: INTERNAL MEDICINE

## 2022-06-29 PROCEDURE — 1111F DSCHRG MED/CURRENT MED MERGE: CPT | Performed by: INTERNAL MEDICINE

## 2022-06-29 PROCEDURE — 99213 OFFICE O/P EST LOW 20 MIN: CPT | Performed by: INTERNAL MEDICINE

## 2022-06-29 PROCEDURE — 3017F COLORECTAL CA SCREEN DOC REV: CPT | Performed by: INTERNAL MEDICINE

## 2022-06-29 PROCEDURE — G8427 DOCREV CUR MEDS BY ELIG CLIN: HCPCS | Performed by: INTERNAL MEDICINE

## 2022-06-29 NOTE — PROGRESS NOTES
MA Communication:   The following orders are received by verbal communication from Mary Ordaz MD    Orders include:    ONPO: Patient to call with the name of DME  6 month CT wo contrast  Follow up

## 2022-06-29 NOTE — PATIENT INSTRUCTIONS
Remember to bring a list of pulmonary medications and any CPAP or BiPAP machines to your next appointment with the office. Please keep all of your future appointments scheduled by [facility] Pulmonary office. Out of respect for other patients and providers, you may be asked to reschedule your appointment if you arrive later than your scheduled appointment time. Appointments cancelled less than 24hrs in advance will be considered a no show. Patients with three missed appointments within 1 year or four missed appointments within 2 years can be dismissed from the practice. Please be aware that our physicians are required to work in the Intensive Care Unit at [facility].  Your appointment may need to be rescheduled if they are designated to work during your appointment time. You may receive a survey regarding the care you received during your visit. Your input is valuable to us. We encourage you to complete and return your survey. We hope you will choose us in the future for your healthcare needs. Pt instructed of all future appointment dates & times, including radiology, labs, procedures & referrals. If procedures were scheduled preparation instructions provided. Instructions on future appointments with [facility] Pulmonary were given.

## 2022-06-29 NOTE — PROGRESS NOTES
Chief Complaint/Referring Provider:  Pulmonary follow up after lung surgery    Patient has come to the office for a pulmonary follow-up after lung surgery, patient was referred to CT surgery for evaluation for wedge resection for lung nodule with cavity with possible mycetoma, patient states that she stayed in the hospital for 1 day and she was told to go home because she was doing well with the surgery, patient feels better now, patient does not have a significant cough expectorant shortness of breath or wheezing, does not have any chest pain or palpitations, no fever no chills, patient does not have any increasing nasal congestion, patient does not give history of any sleep fragmentation at this time, patient does not have any abdominal symptoms of concern, no increasing leg edema, patient has been also referred to ID by CT surgery after the lung biopsy, patient also wanted know if she needs to continue with oxygen at nighttime or not, no other pertinent review of system of concern      Previous HPI:Patient has come to the office for a pulmonary follow-up and to discuss that clinical status and the test results, patient states that she does not have any more coughing but patient states that she feels that she has a different type of phlegm in the postnasal drainage, it has more of metallic taste, patient does not have any reflux symptoms, patient does not have any otalgia no ear discharge, no epistaxis or hemoptysis, patient does have some mucoid expectoration, patient does not have any significant fever or chills, patient has occasional wheezing, patient also has had some right-sided chest and back pain, patient's appetite is maintained, patient does not have any constant symptoms of concern, patient does not have any nasal sprays at home, patient does not have new medications, patient has issues with mold in the basement, not have any sleep fragmentation at this time, patient states that she feels some short of breath when she bends forward and patient feels that she needs oxygen at that time but patient's saturation has not dropped below 90% at any point, patient does not have any other pertinent review of system of concern        Patient is a 79-year-old female who has been referred to the office for a pulmonary evaluation for abnormal CT of the chest    Patient states that she was hospitalized for 7 days in middle of December because of pneumonia and then was told that she also had positive Covid at that time, patient states that after recuperation she was discharged home and patient was sent home on oxygen, patient states that she does have some shortness of breath when she walks around for long time, patient also has been having some back and front chest pain at times along with occasional palpitations at nighttime, patient does not have any fever no chills, patient does not have any increasing sinus congestion of concern, no epistaxis or hemoptysis, patient does not have any significant abdominal discomfort nausea vomiting or reflux symptoms, patient does not have any significant change in the bowel movements, no hematochezia or melena, patient does not have any increasing swelling of the lower extremities, patient's weight is maintained, patient does snore at nighttime along with that patient also states naps in the daytime, patient has multiple pets in the form of dog birds and fish along with that patient has a gas based heating system without any humidification, 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 to any flour, patient states that when she climbs the stairs her saturation drops from 95 to 82%, patient does not have any confusion lethargy, patient has not been a smoker, patient does not have any change in the ambient environment, no other pertinent review of system of concern    Past Medical History:   Diagnosis Date    Cancer ([facility] [address]     cervical    Lung nodule     RLL    Pneumonia 2021    uses O2 2L/NC at night    Wears partial dentures        Past Surgical History:   Procedure Laterality Date     SECTION      x2 - \"emergency\"    COLONOSCOPY      LEEP      x2    LUNG SURGERY Right 2022    THORACOSCOPY Right 2022    RIGHT VIDEO ASSISTED THORACOSCOPIC SURGERY WITH WEDGE RESECTION  OF RIGHT LOWER LOBE WITH INTERCOSTAL NERVE BLOCK AND BRONCHOSCOPY performed by Marco A Kwon MD at [place] [address] Other      Sensitive to all medications - everything causes reaction       Medication list was reviewed and updated as needed in [facility]    Social History     Socioeconomic History    Marital status:      Spouse name: Not on file    Number of children: Not on file    Years of education: Not on file    Highest education level: Not on file   Occupational History    Not on file   Tobacco Use    Smoking status: Never Smoker    Smokeless tobacco: Never Used   Vaping Use    Vaping Use: Never used   Substance and Sexual Activity    Alcohol use: Yes     Comment: occasional    Drug use: No    Sexual activity: Yes   Other Topics Concern    Not on file   Social History Narrative    Not on file     Social Determinants of Health     Financial Resource Strain:     Difficulty of Paying Living Expenses: Not on file   Food Insecurity:     Worried About [address] in the Last Year: Not on file    [place] of Food in the Last Year: Not on file   Transportation Needs:     Lack of Transportation (Medical): Not on file    Lack of Transportation (Non-Medical):  Not on file   Physical Activity:     Days of Exercise per Week: Not on file    Minutes of Exercise per Session: Not on file   Stress:     Feeling of Stress : Not on file   Social Connections:     Frequency of Communication with Friends and Family: Not on file    Frequency of Social Gatherings with Friends and Family: Not on file    Attends [employer] Services: Not on file   [facility] Active Member of Clubs or Organizations: Not on file    Attends Club or Organization Meetings: Not on file    Marital Status: Not on file   Intimate Partner Violence:     Fear of Current or Ex-Partner: Not on file    Emotionally Abused: Not on file    Physically Abused: Not on file    Sexually Abused: Not on file   Housing Stability:     Unable to Pay for Housing in the Last Year: Not on file    Number of [place] in the Last Year: Not on file    Unstable Housing in the Last Year: Not on file       Family History   Problem Relation Age of Onset    Breast Cancer Mother     Heart Attack Father             Review of Systems same as above    Physical Exam:  Blood pressure (!) 151/83, pulse 67, temperature 97.2 °F (36.2 °C), temperature source Temporal, resp. rate 16, height 5' 4.5\" (1.638 m), weight 208 lb (94.3 kg), last menstrual period 02/27/2012, SpO2 97 %.'  Constitutional:  No acute distress. HENT:  Oropharynx is clear and moist. No thyromegaly. Eyes:  Conjunctivae arenormal. Pupils equal, round, and reactive to light. No scleral icterus. Neck: . No tracheal deviation present. No obvious thyroid mass. Cardiovascular:Normal rate, regular rhythm, normal heart sounds. No right ventricular heave. Nolower extremity edema. Pulmonary/Chest: No wheezes. No rales. Chest wall is not dull to percussion. Noaccessory muscle usage or stridor. Decreased breath sound intensity  Abdominal: Soft. Bowel sounds present. No distension or hernia. Notenderness. Musculoskeletal: No cyanosis. No clubbing. No obvious joint deformity. Lymphadenopathy: No cervical or supraclavicular adenopathy. Skin: Skin is warm and dry. No rash or nodules on the exposed extremities. Psychiatric: Normal mood and affect. Behavior is normal.  No anxiety. Neurologic: Alert, awake and oriented. PERRL. Speech fluent        Data:     Imaging:  I have reviewed radiology images personally.   No orders to display     CT CHEST WO CONTRAST    Result Date: 1/25/2022  EXAMINATION: CT OF THE CHEST WITHOUT CONTRAST 1/25/2022 7:44 am TECHNIQUE: CT of the chest was performed without the administration of intravenous contrast. Multiplanar reformatted images are provided for review. Dose modulation, iterative reconstruction, and/or weight based adjustment of the mA/kV was utilized to reduce the radiation dose to as low as reasonably achievable. COMPARISON: 12/16/2021 and 04/28/2012 HISTORY: ORDERING SYSTEM PROVIDED HISTORY: Pneumonia due to COVID-19 virus TECHNOLOGIST PROVIDED HISTORY: Reason for exam:->covid pneumonia last month, oxygen still dropping at room air. covid symptoms started Beginning of December Reason for Exam: follow up pneumonia; on 02 ; covid hx x 1 month ago Additional signs and symptoms: still having sob Relevant Medical/Surgical History: non smoker; no surgery hx on chest; FINDINGS: Mediastinum: The unenhanced heart is unremarkable. There are mildly enlarged mediastinal lymph nodes, likely reactive. An index subcarinal lymph node measures 1.2 x 2.0 cm. Lungs/pleura: The tracheobronchial tree is patent. There is no pneumothorax or pleural effusion. However, there are diffuse nonspecific ground-glass opacities throughout the bilateral lungs due to an infectious/inflammatory process. In addition, within the superior segment of the right lower lobe, there is a partially cavitating 1.9 x 2.6 cm pulmonary nodule. This may be endobronchial as there are fluid-filled tubular structures distal to the lesion. No change in the 3 mm solid right upper lobe pulmonary nodule. Upper Abdomen: A small hiatal hernia is noted. The liver is diffusely low in attenuation consistent with hepatic steatosis. Soft Tissues/Bones: Degenerative changes involve the thoracic spine and bilateral glenohumeral joints.      1. Diffuse nonspecific ground-glass opacities throughout the bilateral lungs due to infectious/inflammatory process including atypical evidence of a pneumothorax or   pleural effusion. Nano Collar is mild pleural and parenchymal scarring within the   right middle lobe. Nnao Collar is a persistent nonspecific 2.6 x 1.8 cm partially   cavitary nodule within the superior segment of the right lower lobe, similar   in comparison with the study of 01/25/2022.  There are multiple stable   smaller subcentimeter nodular opacities at the periphery of this cavitary   nodule.  Additional scattered subcentimeter solid and ground-glass nodules   throughout both lungs are also similar to the study of 01/25/2022, and are   likely benign and infectious or inflammatory in etiology.  No new suspicious   pulmonary nodule or mass is identified.       Upper Abdomen: The liver is somewhat heterogeneous in attenuation, likely   secondary to fatty infiltration with foci of fatty sparing.  The adrenal   glands are stable and unremarkable.  The remainder of the upper abdomen is   unremarkable.       Soft Tissues/Bones: There is mild multilevel degenerative change throughout   the thoracic spine with multilevel Schmorl's node deformities identified. There is a stable vertebral body hemangioma within the lower thoracic spine. No osteolytic or osteoblastic lesion is identified.           Impression   1. Some slight interval improvement of diffuse ground-glass airspace   opacities as seen throughout both lungs, with residual opacities remaining,   most consistent with an improving atypical multifocal pneumonia. 2. No significant change in size or appearance of a 2.6 x 1.8 cm cavitary   nodule within the superior segment of the right lower lobe, with peripheral   small subcentimeter nodules remaining, also unchanged.  This could represent   a chronic mycetoma, a slow healing cavitary pneumonia, or possibly a   quiescent cavitary malignancy.  Consider further characterization with a   PET-CT or tissue sampling.    3. Additional stable subcentimeter solid and ground-glass nodules throughout   both lungs are most likely benign and infectious or inflammatory in etiology. 4. Stable mild mediastinal lymphadenopathy, most likely benign and reactive   in etiology. Patient is a 6-minute walk test does not show patient to have any exercise hypoxemia    A.  Right lower lobe superior segment lung nodule, wedge resection:   - Necrotizing granulomatous inflammation.   - Negative for malignancy. - [place] methenamine silver stain is positive for rare fungal elements   consistent with Histoplasma capsulatum. - Ziehl-Neelsen stain is negative for acid-fast bacilli. Assessment:    1. Pneumonia with cavity of lung-Histoplasmosis         2. 3. Obesity (BMI 30-39.9)      4. Personal history of COVID-19      5. Pulmonary infiltrates      6.  Mediastinal adenopathy          Plan:   · Patient's review of system were discussed  · Patient was told about the clinical findings during auscultation and implications  · Patient's surgical biopsy results were reviewed and patient has histoplasmosis-patient was told that the resection clean margins were clean  · Patient has been referred to ID for evaluation by CT surgery  · Overnight pulse oximetry to be ordered to assess if patient needs any supplemental oxygen at nighttime now or not  · Patient to have a repeat CT of the chest in 6 months time to assess any other new nodules or any adenopathy of concern  · Patient does not have any exertional hypoxemia at this time  · Patient was told about the consult for hypoxemia and hypercarbia  · Patient does not need any supplemental oxygen below 88% oxygen saturation  · Patient was told about that there is a weak correlation between shortness of breath and hypoxemia and shortness of breath does not mean that patient needs to be on oxygen  · Further management depending on patient's clinical status and the overnight pulse oximetry along with repeat CT of the chest

## 2022-06-30 ENCOUNTER — TELEPHONE (OUTPATIENT)
Dept: INFECTIOUS DISEASES | Age: 62
End: 2022-06-30

## 2022-06-30 NOTE — TELEPHONE ENCOUNTER
----- Message from Judy Shukla RN sent at 6/29/2022  3:09 PM EDT -----  New referral in Epic:  Necrotizing granulomatous inflammation of lung

## 2022-06-30 NOTE — TELEPHONE ENCOUNTER
Histoplasma mycetoma s/p wedge resection  She is seeing Pulm. I'm not sure that she needs to see me as well.   Please see what she would like to do   Thanks

## 2022-07-05 NOTE — TELEPHONE ENCOUNTER
Spoke with patient and advised of MD note and she stated that she had nodule removed approx 3 weeks ago. Stated that she is comfortable with not seeing ID at this time however will call with any concerns.

## 2022-07-18 LAB
FUNGUS (MYCOLOGY) CULTURE: NORMAL
FUNGUS STAIN: NORMAL

## 2022-08-02 LAB
AFB CULTURE (MYCOBACTERIA): NORMAL
AFB SMEAR: NORMAL

## 2022-08-09 ENCOUNTER — TELEPHONE (OUTPATIENT)
Dept: CARDIOTHORACIC SURGERY | Age: 62
End: 2022-08-09

## 2022-08-09 ENCOUNTER — OFFICE VISIT (OUTPATIENT)
Dept: FAMILY MEDICINE CLINIC | Age: 62
End: 2022-08-09
Payer: MEDICAID

## 2022-08-09 VITALS
SYSTOLIC BLOOD PRESSURE: 110 MMHG | HEIGHT: 65 IN | DIASTOLIC BLOOD PRESSURE: 78 MMHG | HEART RATE: 92 BPM | BODY MASS INDEX: 35.16 KG/M2 | OXYGEN SATURATION: 98 % | WEIGHT: 211 LBS

## 2022-08-09 DIAGNOSIS — R10.9 ABDOMINAL PAIN, UNSPECIFIED ABDOMINAL LOCATION: Primary | ICD-10-CM

## 2022-08-09 DIAGNOSIS — E11.9 TYPE 2 DIABETES MELLITUS WITHOUT COMPLICATION, WITHOUT LONG-TERM CURRENT USE OF INSULIN (HCC): ICD-10-CM

## 2022-08-09 LAB — HBA1C MFR BLD: 6.3 %

## 2022-08-09 PROCEDURE — G8417 CALC BMI ABV UP PARAM F/U: HCPCS | Performed by: NURSE PRACTITIONER

## 2022-08-09 PROCEDURE — 83036 HEMOGLOBIN GLYCOSYLATED A1C: CPT | Performed by: NURSE PRACTITIONER

## 2022-08-09 PROCEDURE — 1036F TOBACCO NON-USER: CPT | Performed by: NURSE PRACTITIONER

## 2022-08-09 PROCEDURE — 99213 OFFICE O/P EST LOW 20 MIN: CPT | Performed by: NURSE PRACTITIONER

## 2022-08-09 PROCEDURE — 3044F HG A1C LEVEL LT 7.0%: CPT | Performed by: NURSE PRACTITIONER

## 2022-08-09 PROCEDURE — 2022F DILAT RTA XM EVC RTNOPTHY: CPT | Performed by: NURSE PRACTITIONER

## 2022-08-09 PROCEDURE — G8427 DOCREV CUR MEDS BY ELIG CLIN: HCPCS | Performed by: NURSE PRACTITIONER

## 2022-08-09 PROCEDURE — 3017F COLORECTAL CA SCREEN DOC REV: CPT | Performed by: NURSE PRACTITIONER

## 2022-08-09 ASSESSMENT — PATIENT HEALTH QUESTIONNAIRE - PHQ9
SUM OF ALL RESPONSES TO PHQ9 QUESTIONS 1 & 2: 0
2. FEELING DOWN, DEPRESSED OR HOPELESS: 0
SUM OF ALL RESPONSES TO PHQ QUESTIONS 1-9: 0
1. LITTLE INTEREST OR PLEASURE IN DOING THINGS: 0
SUM OF ALL RESPONSES TO PHQ QUESTIONS 1-9: 0

## 2022-08-09 ASSESSMENT — ENCOUNTER SYMPTOMS
ABDOMINAL PAIN: 1
RESPIRATORY NEGATIVE: 1

## 2022-08-09 NOTE — TELEPHONE ENCOUNTER
Pt s/p R VATS with wedge resection of RL Lobe on 6/13/22. Pt recovered and has been doing well. Called today to report that on Saturday she developed a pea sized knot in her right upper abdomen. She was reportedly increasing her level of activity and this developed. States when pressure incidentally applied she gets nauseated. She was seen by her PCP. Incisions are healed. No redness or drainage. Was told by PCP to notify this office and to continue to monitor. Reviewed with Taye Radford RN. She will continue to monitor but will call should her condition require.

## 2022-08-09 NOTE — PROGRESS NOTES
Patient: Hollis Escamilla is a 58 y.o. female who presents today with the following Chief Complaint(s):  Chief Complaint   Patient presents with    Diabetes       Assessment:  Encounter Diagnoses   Name Primary? Abdominal pain, unspecified abdominal location Yes    Type 2 diabetes mellitus without complication, without long-term current use of insulin [facility]        Plan:  1. Abdominal pain, unspecified abdominal location  We will continue to monitor. Could be muscular. She is can call and check with the surgeon to see if it could be anything related to her surgery given that she is now 8 weeks out. Patient will let me know if symptoms are worsening or not improving. 2. Type 2 diabetes mellitus without complication, without long-term current use of insulin ([facility])  A1c today was 6.3. No medications at this time. She will continue to work on diet and exercise. - POCT glycosylated hemoglobin (Hb A1C)    HPI  Patient presents today to follow-up on chronic conditions. 8 weeks ago she had a right lung wedge resection of the right lower lobe. They determined it was noncancerous. Patient states overall she has been recovering well but over the past 2 days she has noticed some abdominal pain in a very specific spot. This is not near her incisions but she wonders if it still related to the surgery. The pain is not worse with eating and she denies any constipation or diarrhea. She did increase her exercise slightly and wonders if that is related. Patient has also been a pre-diabetic, A1C today is 6.3. Current Outpatient Medications   Medication Sig Dispense Refill    acetaminophen (TYLENOL) 325 mg tablet Take 650 mg by mouth every 6 hours as needed for Pain      nystatin (MYCOSTATIN) 633951 UNIT/GM powder Apply 3 times daily. (Patient taking differently: Apply topically 3 times daily as needed Apply 3 times daily.) 60 g 1     No current facility-administered medications for this visit.        Patient's past medical history, surgical history, family history, medications,and allergies  were all reviewed and updated as appropriate today. Review of Systems   Constitutional: Negative. HENT: Negative. Respiratory: Negative. Cardiovascular: Negative. Gastrointestinal:  Positive for abdominal pain. Musculoskeletal: Negative. Skin: Negative. Neurological:  Negative for dizziness and headaches. Psychiatric/Behavioral: Negative. Physical Exam  Vitals reviewed. Cardiovascular:      Rate and Rhythm: Normal rate and regular rhythm. Heart sounds: Normal heart sounds. Pulmonary:      Breath sounds: Normal breath sounds. Abdominal:      General: Bowel sounds are normal.       Skin:     General: Skin is warm and dry. Neurological:      Mental Status: She is alert and oriented to person, place, and time. Psychiatric:         Mood and Affect: Mood normal.         Behavior: Behavior normal.     Vitals:    08/09/22 1058   BP: 110/78   Pulse: 92   SpO2: 98%       This chart was generated using the Dragon dictation system. I created this record but it may contain dictation errors due to the limitation of the software.

## 2022-09-01 ENCOUNTER — TELEPHONE (OUTPATIENT)
Dept: PULMONOLOGY | Age: 62
End: 2022-09-01

## 2022-09-01 NOTE — TELEPHONE ENCOUNTER
Patient had surgery 11 weeks ago, and is having some trouble with coughing and oxygen dropping to 94 without oxygen. Feeling like she has an upper chest infection.   Please call patient and advise

## 2022-09-02 NOTE — TELEPHONE ENCOUNTER
Advised pt. Of Dr. Guilherme Mas message. Advised iF drainage becomes colored or pt. Starts to run temps she should be seen or can go to urgent care if over the weekend. Clear drainage most likely sinus or alg. Voiced understanding.

## 2022-09-29 ENCOUNTER — OFFICE VISIT (OUTPATIENT)
Dept: FAMILY MEDICINE CLINIC | Age: 62
End: 2022-09-29
Payer: MEDICAID

## 2022-09-29 VITALS
HEIGHT: 64 IN | BODY MASS INDEX: 36.02 KG/M2 | DIASTOLIC BLOOD PRESSURE: 78 MMHG | TEMPERATURE: 97.2 F | OXYGEN SATURATION: 97 % | SYSTOLIC BLOOD PRESSURE: 134 MMHG | WEIGHT: 211 LBS | HEART RATE: 68 BPM

## 2022-09-29 DIAGNOSIS — D22.9 SKIN MOLE: Primary | ICD-10-CM

## 2022-09-29 PROCEDURE — 3017F COLORECTAL CA SCREEN DOC REV: CPT | Performed by: NURSE PRACTITIONER

## 2022-09-29 PROCEDURE — G8427 DOCREV CUR MEDS BY ELIG CLIN: HCPCS | Performed by: NURSE PRACTITIONER

## 2022-09-29 PROCEDURE — 99213 OFFICE O/P EST LOW 20 MIN: CPT | Performed by: NURSE PRACTITIONER

## 2022-09-29 PROCEDURE — G8417 CALC BMI ABV UP PARAM F/U: HCPCS | Performed by: NURSE PRACTITIONER

## 2022-09-29 PROCEDURE — 1036F TOBACCO NON-USER: CPT | Performed by: NURSE PRACTITIONER

## 2022-09-29 SDOH — ECONOMIC STABILITY: FOOD INSECURITY: WITHIN THE PAST 12 MONTHS, YOU WORRIED THAT YOUR FOOD WOULD RUN OUT BEFORE YOU GOT MONEY TO BUY MORE.: NEVER TRUE

## 2022-09-29 SDOH — ECONOMIC STABILITY: FOOD INSECURITY: WITHIN THE PAST 12 MONTHS, THE FOOD YOU BOUGHT JUST DIDN'T LAST AND YOU DIDN'T HAVE MONEY TO GET MORE.: NEVER TRUE

## 2022-09-29 ASSESSMENT — SOCIAL DETERMINANTS OF HEALTH (SDOH): HOW HARD IS IT FOR YOU TO PAY FOR THE VERY BASICS LIKE FOOD, HOUSING, MEDICAL CARE, AND HEATING?: NOT HARD AT ALL

## 2022-09-29 ASSESSMENT — ENCOUNTER SYMPTOMS: RESPIRATORY NEGATIVE: 1

## 2022-09-29 NOTE — PROGRESS NOTES
Patient: Ilene Renteria is a 58 y.o. female who presents today with the following Chief Complaint(s):  Chief Complaint   Patient presents with    Skin Tag     Skin vs mole, under left arm, by ribs, small tear with drainage, x 3 days       Assessment:  Encounter Diagnosis   Name Primary? Skin mole Yes       Plan:  1. Skin mole  No signs of infection today. Will refer to dermatology for further evaluation.   - [facility] - Krystyna Isaac MD, Dermatology, [facility]  Patient presents today with concerns of an infected mole on the left side of her abdomen. She states she had a mole there and then she noticed it was swollen. She put a band aide on it and then noticed some puss on the band aide. Then she noticed a little bit of bleeding. She states the swelling is gone now and it looks better today. Current Outpatient Medications   Medication Sig Dispense Refill    acetaminophen (TYLENOL) 325 mg tablet Take 650 mg by mouth every 6 hours as needed for Pain      nystatin (MYCOSTATIN) 240030 UNIT/GM powder Apply 3 times daily. (Patient taking differently: Apply topically 3 times daily as needed Apply 3 times daily.) 60 g 1     No current facility-administered medications for this visit. Patient's past medical history, surgical history, family history, medications,and allergies  were all reviewed and updated as appropriate today. Review of Systems   Constitutional: Negative. HENT: Negative. Respiratory: Negative. Cardiovascular: Negative. Skin:  Positive for wound. Neurological: Negative. Psychiatric/Behavioral: Negative. Physical Exam  Vitals reviewed. Pulmonary:      Effort: Pulmonary effort is normal.   Skin:     General: Skin is warm and dry. Findings: Lesion present. Neurological:      Mental Status: She is alert and oriented to person, place, and time.      Vitals:    09/29/22 0909   BP: 134/78   Pulse: 68   Temp: 97.2 °F (36.2 °C)   SpO2: 97%       This chart was generated using the [address] [place] [address] dictation system. I created this record but it may contain dictation errors due to the limitation of the software.

## 2022-11-29 ENCOUNTER — COMMUNITY OUTREACH (OUTPATIENT)
Dept: FAMILY MEDICINE CLINIC | Age: 62
End: 2022-11-29

## 2022-11-29 NOTE — PROGRESS NOTES
Patient's HM shows they are overdue for Mammogram   CareEverywhere and  files searched  /without success.

## 2022-12-16 ENCOUNTER — HOSPITAL ENCOUNTER (OUTPATIENT)
Dept: CT IMAGING | Age: 62
Discharge: HOME | End: 2022-12-16
Payer: MEDICAID

## 2022-12-16 ENCOUNTER — OFFICE VISIT (OUTPATIENT)
Dept: PULMONOLOGY | Age: 62
End: 2022-12-16
Payer: MEDICAID

## 2022-12-16 VITALS
DIASTOLIC BLOOD PRESSURE: 79 MMHG | TEMPERATURE: 97.6 F | HEIGHT: 64 IN | BODY MASS INDEX: 34.66 KG/M2 | HEART RATE: 67 BPM | WEIGHT: 203 LBS | RESPIRATION RATE: 16 BRPM | SYSTOLIC BLOOD PRESSURE: 141 MMHG | OXYGEN SATURATION: 97 %

## 2022-12-16 DIAGNOSIS — R59.0 MEDIASTINAL ADENOPATHY: ICD-10-CM

## 2022-12-16 DIAGNOSIS — R91.1 LUNG NODULE: ICD-10-CM

## 2022-12-16 DIAGNOSIS — B39.9 HISTOPLASMOSIS: Primary | ICD-10-CM

## 2022-12-16 DIAGNOSIS — B39.9 HISTOPLASMOSIS: ICD-10-CM

## 2022-12-16 DIAGNOSIS — R09.02 HYPOXEMIA: ICD-10-CM

## 2022-12-16 DIAGNOSIS — E66.9 OBESITY (BMI 30-39.9): ICD-10-CM

## 2022-12-16 PROCEDURE — G8427 DOCREV CUR MEDS BY ELIG CLIN: HCPCS | Performed by: INTERNAL MEDICINE

## 2022-12-16 PROCEDURE — 1036F TOBACCO NON-USER: CPT | Performed by: INTERNAL MEDICINE

## 2022-12-16 PROCEDURE — G8417 CALC BMI ABV UP PARAM F/U: HCPCS | Performed by: INTERNAL MEDICINE

## 2022-12-16 PROCEDURE — 3017F COLORECTAL CA SCREEN DOC REV: CPT | Performed by: INTERNAL MEDICINE

## 2022-12-16 PROCEDURE — 71250 CT THORAX DX C-: CPT

## 2022-12-16 PROCEDURE — G8484 FLU IMMUNIZE NO ADMIN: HCPCS | Performed by: INTERNAL MEDICINE

## 2022-12-16 PROCEDURE — 99213 OFFICE O/P EST LOW 20 MIN: CPT | Performed by: INTERNAL MEDICINE

## 2022-12-16 NOTE — LETTER
[address] [facility] Pulmonary Critical Care and Sleep  [facility] [address] [facility]  Phone: [phone]  Fax: [phone]    Iveth Griffin MD    December 16, 2022     [facility]  [address] [place] [address]    Patient: Kahlil Salas   MR Number: [id number]   YOB: 1960   Date of Visit: 12/16/2022       Dear Olegario Montaño:    Thank you for referring Kahlil Salas to me for evaluation/treatment. Below are the relevant portions of my assessment and plan of care. If you have questions, please do not hesitate to call me. I look forward to following Keily Jones along with you.     Sincerely,      Iveth Griffin MD

## 2022-12-16 NOTE — PROGRESS NOTES
Chief Complaint/Referring Provider:  Pulmonary follow up discussed the clinical status and the test results    Patient has come to the office for a pulmonary follow-up and to discuss the CT results, patient states that she has taken up a new job at a [employer] and patient states that on her job she has to bend forward and also stretch her hand overhead multiple times which causes her to have intermittent lightheadedness which goes away when she takes a deep breath, patient states that she also has some pleuritic chest pain where the surgery was done, patient does not have any significant cough or phlegm, patient does not have any fever or chills, patient does not have any hemoptysis, patient does not have any increasing leg swelling, no cardiac issues that patient knows of, patient also wanted to know if the oxygen which has been given to her can be sent back to the [place], patient does not have have increased tiredness and does not think that she has any sleep apnea, no other pertinent review of system of concern    Previous HPI:Patient has come to the office for a pulmonary follow-up after lung surgery, patient was referred to CT surgery for evaluation for wedge resection for lung nodule with cavity with possible mycetoma, patient states that she stayed in the hospital for 1 day and she was told to go home because she was doing well with the surgery, patient feels better now, patient does not have a significant cough expectorant shortness of breath or wheezing, does not have any chest pain or palpitations, no fever no chills, patient does not have any increasing nasal congestion, patient does not give history of any sleep fragmentation at this time, patient does not have any abdominal symptoms of concern, no increasing leg edema, patient has been also referred to ID by CT surgery after the lung biopsy, patient also wanted know if she needs to continue with oxygen at nighttime or not, no other pertinent review of system of concern      Patient has come to the office for a pulmonary follow-up and to discuss that clinical status and the test results, patient states that she does not have any more coughing but patient states that she feels that she has a different type of phlegm in the postnasal drainage, it has more of metallic taste, patient does not have any reflux symptoms, patient does not have any otalgia no ear discharge, no epistaxis or hemoptysis, patient does have some mucoid expectoration, patient does not have any significant fever or chills, patient has occasional wheezing, patient also has had some right-sided chest and back pain, patient's appetite is maintained, patient does not have any constant symptoms of concern, patient does not have any nasal sprays at home, patient does not have new medications, patient has issues with mold in the basement, not have any sleep fragmentation at this time, patient states that she feels some short of breath when she bends forward and patient feels that she needs oxygen at that time but patient's saturation has not dropped below 90% at any point, patient does not have any other pertinent review of system of concern        Patient is a 57-year-old female who has been referred to the office for a pulmonary evaluation for abnormal CT of the chest    Patient states that she was hospitalized for 7 days in middle of December because of pneumonia and then was told that she also had positive Covid at that time, patient states that after recuperation she was discharged home and patient was sent home on oxygen, patient states that she does have some shortness of breath when she walks around for long time, patient also has been having some back and front chest pain at times along with occasional palpitations at nighttime, patient does not have any fever no chills, patient does not have any increasing sinus congestion of concern, no epistaxis or hemoptysis, patient does not have any significant abdominal discomfort nausea vomiting or reflux symptoms, patient does not have any significant change in the bowel movements, no hematochezia or melena, patient does not have any increasing swelling of the lower extremities, patient's weight is maintained, patient does snore at nighttime along with that patient also states naps in the daytime, patient has multiple pets in the form of dog birds and fish along with that patient has a gas based heating system without any humidification, 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 to any flour, patient states that when she climbs the stairs her saturation drops from 95 to 82%, patient does not have any confusion lethargy, patient has not been a smoker, patient does not have any change in the ambient environment, no other pertinent review of system of concern    Past Medical History:   Diagnosis Date    Cancer ([facility] [address]     cervical    Lung nodule     RLL    Pneumonia 2021    uses O2 2L/NC at night    Wears partial dentures        Past Surgical History:   Procedure Laterality Date     SECTION      x2 - \"emergency\"    COLONOSCOPY      LEEP      x2    LUNG SURGERY Right 2022    THORACOSCOPY Right 2022    RIGHT VIDEO ASSISTED THORACOSCOPIC SURGERY WITH WEDGE RESECTION  OF RIGHT LOWER LOBE WITH INTERCOSTAL NERVE BLOCK AND BRONCHOSCOPY performed by Khris Pierre MD at [address]   Allergen Reactions    Other      Sensitive to all medications - everything causes reaction       Medication list was reviewed and updated as needed in Epic    Social History     Socioeconomic History    Marital status:      Spouse name: Not on file    Number of children: Not on file    Years of education: Not on file    Highest education level: Not on file   Occupational History    Not on file   Tobacco Use    Smoking status: Never    Smokeless tobacco: Never   Vaping Use    Vaping Use: Never used   Substance and Sexual Activity    Alcohol use: Yes     Comment: occasional    Drug use: No    Sexual activity: Yes   Other Topics Concern    Not on file   Social History Narrative    Not on file     Social Determinants of Health     Financial Resource Strain: Low Risk     Difficulty of Paying Living Expenses: Not hard at all   Food Insecurity: No Food Insecurity    Worried About Running Out of Food in the Last Year: Never true    Ran Out of Food in the Last Year: Never true   Transportation Needs: Not on file   Physical Activity: Not on file   Stress: Not on file   Social Connections: Not on file   Intimate Partner Violence: Not on file   Housing Stability: Not on file       Family History   Problem Relation Age of Onset    Breast Cancer Mother     Heart Attack Father             Review of Systems same as above    Physical Exam:  Blood pressure (!) 141/79, pulse 67, temperature 97.6 °F (36.4 °C), temperature source Temporal, resp. rate 16, height 5' 4\" (1.626 m), weight 203 lb (92.1 kg), last menstrual period 02/27/2012, SpO2 97 %.'  Constitutional:  No acute distress. HENT:  Oropharynx is clear and moist. No thyromegaly. Eyes:  Conjunctivae arenormal. Pupils equal, round, and reactive to light. No scleral icterus. Neck: . No tracheal deviation present. No obvious thyroid mass. Cardiovascular:Normal rate, regular rhythm, normal heart sounds. No right ventricular heave. Nolower extremity edema. Pulmonary/Chest: No wheezes. No rales. Chest wall is not dull to percussion. Noaccessory muscle usage or stridor. Decreased breath sound intensity  Abdominal: Soft. Bowel sounds present. No distension or hernia. Notenderness. Musculoskeletal: No cyanosis. No clubbing. No obvious joint deformity. Lymphadenopathy: No cervical or supraclavicular adenopathy. Skin: Skin is warm and dry. No rash or nodules on the exposed extremities. Psychiatric: Normal mood and affect. Behavior is normal.  No anxiety.   Neurologic: Alert, awake and oriented. PERRL. Speech fluent        Data:     Imaging:  I have reviewed radiology images personally. No orders to display     CT CHEST WO CONTRAST    Result Date: 1/25/2022  EXAMINATION: CT OF THE CHEST WITHOUT CONTRAST 1/25/2022 7:44 am TECHNIQUE: CT of the chest was performed without the administration of intravenous contrast. Multiplanar reformatted images are provided for review. Dose modulation, iterative reconstruction, and/or weight based adjustment of the mA/kV was utilized to reduce the radiation dose to as low as reasonably achievable. COMPARISON: 12/16/2021 and 04/28/2012 HISTORY: ORDERING SYSTEM PROVIDED HISTORY: Pneumonia due to COVID-19 virus TECHNOLOGIST PROVIDED HISTORY: Reason for exam:->covid pneumonia last month, oxygen still dropping at room air. covid symptoms started Beginning of December Reason for Exam: follow up pneumonia; on 02 ; covid hx x 1 month ago Additional signs and symptoms: still having sob Relevant Medical/Surgical History: non smoker; no surgery hx on chest; FINDINGS: Mediastinum: The unenhanced heart is unremarkable. There are mildly enlarged mediastinal lymph nodes, likely reactive. An index subcarinal lymph node measures 1.2 x 2.0 cm. Lungs/pleura: The tracheobronchial tree is patent. There is no pneumothorax or pleural effusion. However, there are diffuse nonspecific ground-glass opacities throughout the bilateral lungs due to an infectious/inflammatory process. In addition, within the superior segment of the right lower lobe, there is a partially cavitating 1.9 x 2.6 cm pulmonary nodule. This may be endobronchial as there are fluid-filled tubular structures distal to the lesion. No change in the 3 mm solid right upper lobe pulmonary nodule. Upper Abdomen: A small hiatal hernia is noted. The liver is diffusely low in attenuation consistent with hepatic steatosis. Soft Tissues/Bones: Degenerative changes involve the thoracic spine and bilateral glenohumeral joints. 1. Diffuse nonspecific ground-glass opacities throughout the bilateral lungs due to infectious/inflammatory process including atypical viral pneumonia. 2. 2.6 cm partially cavitating right lower lobe pulmonary nodule. The differential diagnosis includes a mycetoma, endobronchial lesion and cavitary pneumonia. 3. Mediastinal adenopathy, likely reactive. CT chest -  CT OF THE CHEST WITHOUT CONTRAST 5/5/2022 11:11 am       TECHNIQUE:   CT of the chest was performed without the administration of intravenous   contrast. Multiplanar reformatted images are provided for review. Dose   modulation, iterative reconstruction, and/or weight based adjustment of the   mA/kV was utilized to reduce the radiation dose to as low as reasonably   achievable. COMPARISON:   Comparison made to prior chest CTs dated 01/25/2022 and 04/28/2012. Comparison also made to prior abdominal CT dated 12/18/2015. HISTORY:   ORDERING SYSTEM PROVIDED HISTORY: Pneumonia with cavity of lung   TECHNOLOGIST PROVIDED HISTORY:   Please call Pre registration # [phone] prior to the test date. Please arrive  30 minutes before the procedure unless otherwise instructed. Reason for Exam: f/u lung nodule, ongoing SOB since pneumonia in Dec 2021   Relevant Medical/Surgical History: no hx of surg to chest; no hx of smoking       FINDINGS:   Mediastinum: The thyroid is unremarkable. There is stable mild mediastinal   lymphadenopathy, with the largest index lymph node measuring approximately 12   mm in short axis within the subcarinal space, unchanged from 01/25/2022. No   new pathologic lymphadenopathy is identified. The intrathoracic aorta is   unremarkable in noncontrast appearance. No pericardial abnormality   identified. Lungs/pleura: The central airways are patent.   There has been interval   improvement and partial interval clearing of diffuse ground-glass opacities   as seen throughout both lungs on the study of 01/25/2022 with residual   diffuse ground-glass opacities remaining. Localized focus of acute airspace   consolidation is identified. There is no evidence of a pneumothorax or   pleural effusion. There is mild pleural and parenchymal scarring within the   right middle lobe. There is a persistent nonspecific 2.6 x 1.8 cm partially   cavitary nodule within the superior segment of the right lower lobe, similar   in comparison with the study of 01/25/2022. There are multiple stable   smaller subcentimeter nodular opacities at the periphery of this cavitary   nodule. Additional scattered subcentimeter solid and ground-glass nodules   throughout both lungs are also similar to the study of 01/25/2022, and are   likely benign and infectious or inflammatory in etiology. No new suspicious   pulmonary nodule or mass is identified. Upper Abdomen: The liver is somewhat heterogeneous in attenuation, likely   secondary to fatty infiltration with foci of fatty sparing. The adrenal   glands are stable and unremarkable. The remainder of the upper abdomen is   unremarkable. Soft Tissues/Bones: There is mild multilevel degenerative change throughout   the thoracic spine with multilevel Schmorl's node deformities identified. There is a stable vertebral body hemangioma within the lower thoracic spine. No osteolytic or osteoblastic lesion is identified. Impression   1. Some slight interval improvement of diffuse ground-glass airspace   opacities as seen throughout both lungs, with residual opacities remaining,   most consistent with an improving atypical multifocal pneumonia. 2. No significant change in size or appearance of a 2.6 x 1.8 cm cavitary   nodule within the superior segment of the right lower lobe, with peripheral   small subcentimeter nodules remaining, also unchanged.   This could represent   a chronic mycetoma, a slow healing cavitary pneumonia, or possibly a   quiescent cavitary malignancy. Consider further characterization with a   PET-CT or tissue sampling. 3. Additional stable subcentimeter solid and ground-glass nodules throughout   both lungs are most likely benign and infectious or inflammatory in etiology. 4. Stable mild mediastinal lymphadenopathy, most likely benign and reactive   in etiology. Patient is a 6-minute walk test does not show patient to have any exercise hypoxemia    A. Right lower lobe superior segment lung nodule, wedge resection:   - Necrotizing granulomatous inflammation.   - Negative for malignancy. - [place] methenamine silver stain is positive for rare fungal elements   consistent with Histoplasma capsulatum. - Ziehl-Neelsen stain is negative for acid-fast bacilli. CT OF THE CHEST WITHOUT CONTRAST 12/16/2022 9:15 am       TECHNIQUE:   CT of the chest was performed without the administration of intravenous   contrast. Multiplanar reformatted images are provided for review. Automated   exposure control, iterative reconstruction, and/or weight based adjustment of   the mA/kV was utilized to reduce the radiation dose to as low as reasonably   achievable. COMPARISON:   05/05/2022 and 04/28/2012       HISTORY:   ORDERING SYSTEM PROVIDED HISTORY: Lung nodule   TECHNOLOGIST PROVIDED HISTORY:   Please call Pre registration # [phone] prior to the test date. Please arrive  30 minutes before the procedure unless otherwise instructed. Reason for Exam: follow up lung nodule removed from right lung,   Additional signs and symptoms: hypoxemia, histoplasmosis       FINDINGS:   Mediastinum: The unenhanced heart is unremarkable. No change in the mildly   enlarged subcarinal lymph node measuring 1.2 x 1.6 cm. Lungs/pleura: The tracheobronchial tree is patent. There is no pneumothorax   or pleural effusion. Status post interval right lower lobe wedge resection.        No change in the nonspecific linear ground-glass opacities throughout the bilateral lungs, likely due to inflammatory scarring from prior infectious   process. No change in the 3 mm solid right lower lobe pulmonary nodules   along the major fissure and suture line. No change in the 3 mm solid right   middle lobe pulmonary nodule, no follow-up imaging is recommended. Upper Abdomen: A small hiatal hernia is noted. There is geographic areas of   low attenuation throughout the liver due to hepatic steatosis. Soft Tissues/Bones: Degenerative changes involve the thoracic spine. Impression   1. Status post interval right lower lobe wedge resection with unchanged 3 mm   solid right lower lobe pulmonary nodules along the suture line bears   attention on the next imaging cycle given the documented history of cancer in   in the medical record. 2. Unchanged mediastinal adenopathy, likely reactive. Assessment:    1. Histoplasmosis         2. 3. Obesity (BMI 30-39.9)      4. Personal history of COVID-19      5. Pulmonary infiltrates      6.  Mediastinal adenopathy          Plan:   Patient's review of system were discussed  Patient was told about the clinical findings during auscultation and implications  Patient's surgical biopsy results have been reviewedand patient has histoplasmosis-patient was told that the resection clean margins were clean  Patient was shown the CT of the chest along with his findings and implications  No significant pulm infiltrates or cavity of concern  Patient does have a small nodule near the suture line which may need to be followed  Overnight pulse oximetry to be ordered to assess if patient needs any supplemental oxygen at nighttime now or not along with 6-minute walk test and if there normal then patient can send back her supplemental oxygen to the DME company  Patient to have a repeat CT of the chest in 6 months time to assess any other new nodules or any adenopathy of concern  If patient still has some dizziness and lightheadedness and patient needs to discuss with PCP about further evaluations  Patient can use some local analgesia like heating pad, icy hot, Bengay or Biofreeze which may help with some discomfort where she had the surgery done on the lateral  chest wall,

## 2022-12-16 NOTE — PATIENT INSTRUCTIONS
Remember to bring a list of pulmonary medications and any CPAP or BiPAP machines to your next appointment with the office. Please keep all of your future appointments scheduled by [address], [facility] office. Out of respect for other patients and providers, you may be asked to reschedule your appointment if you arrive later than your scheduled appointment time. Appointments cancelled less than 24hrs in advance will be considered a no show. Patients with three missed appointments within 1 year or four missed appointments within 2 years can be dismissed from the practice. Please be aware that our physicians are required to work in the Intensive Care Unit at [facility].  Your appointment may need to be rescheduled if they are designated to work during your appointment time. You may receive a survey regarding the care you received during your visit. Your input is valuable to us. We encourage you to complete and return your survey. We hope you will choose us in the future for your healthcare needs. Pt instructed of all future appointment dates & times, including radiology, labs, procedures & referrals. If procedures were scheduled preparation instructions provided. Instructions on future appointments with [facility] were given.

## 2022-12-16 NOTE — PROGRESS NOTES
MA Communication:   The following orders are received by verbal communication from Joshua Ro MD    Orders include:    6 MW   6 month follow up  CT same day

## 2022-12-16 NOTE — LETTER
[address] [facility] Pulmonary Critical Care and Sleep  [address] [facility] [address] [facility]  Phone: [phone]  Fax: [phone]    Genia Rand MD    December 16, 2022     Haylee Cee, KIMBERLY - [facility]  [address] [place] [address]    Patient: Khushboo Arroyo   MR Number: [id number]   YOB: 1960   Date of Visit: 12/16/2022       Dear Haylee Cee:    Thank you for referring Khushboo Arroyo to me for evaluation/treatment. Below are the relevant portions of my assessment and plan of care. If you have questions, please do not hesitate to call me. I look forward to following Joan Garcia along with you.     Sincerely,      Genia Rand MD

## 2022-12-27 ENCOUNTER — HOSPITAL ENCOUNTER (OUTPATIENT)
Dept: PULMONOLOGY | Age: 62
Discharge: HOME | End: 2022-12-27
Payer: MEDICAID

## 2022-12-27 DIAGNOSIS — B39.9 HISTOPLASMOSIS: ICD-10-CM

## 2022-12-27 DIAGNOSIS — R59.0 MEDIASTINAL ADENOPATHY: ICD-10-CM

## 2022-12-27 PROCEDURE — 94618 PULMONARY STRESS TESTING: CPT

## 2022-12-27 NOTE — FLOWSHEET NOTE
[address] [facility] Pulmonary Function Lab - Six Minute Walk      Test Performed on:   Room Air___x___   Oxygen at _____ lpm via N/C- continuous Oxygen at _____ lpm via N/C- OCD  Assist Device Used During Test:  None__x____ Cane________ Walker_________    Modified Cinthya's Scale  0 Nothing at all 5 Strong    0.5 Just noticeable 6 Stronger (Hard)    1 Very Light 7 Very Strong   2 Light 8 Very Very Strong   3 Moderate 9 Extremely Strong   4 Somewhat Strong 10 Maximum All out      Time SpO2 Heart Rate Respiratory Rate Dyspnea-  Modified Cinthyas Scale Fatigue- Modified Cinthyas Scale Other Symptoms   Baseline                     96% 78 18 1 1      1 minute                     95% 97 20 1 1    2 minutes                     95% 94 20 1.5 1      3 minutes                     95% 97 20 1.5 1    4 minutes                     95% 98 22 1.5 1    5 minutes                     94% 100 24 1.5 1    6 minutes                     95% 99 24 1.5 1    Recovery x 1 minute                     97% 77 20 1 1    Recovery x 2 Minute                     97% 77 18 1 1     Number of Laps______14___ X 120 feet + ____40_____ additional feet = Total Distance __1440_____feet   Stopped or paused before 6 minutes? No____x___ Yes ________  Total expected 6 MW distance is _1443______feet. Patient achieved __100____% of expected distance.    Other symptoms at the end of exercise:Patient noted hip pain with walking

## 2022-12-29 NOTE — PROCEDURES
[address] [facility]                 [facility]                               PULMONARY FUNCTION    PATIENT NAME: Serenity Guan                      :        1960  MED REC NO:   [id number]                          ROOM:  ACCOUNT NO:   [de-identified]                           ADMIT DATE: 2022  PROVIDER:     Tristan Yu MD    DATE OF PROCEDURE:  2022    SIX MINUTE WALK TEST INTERPRETATION    The patient is a 26-year-old female who underwent 6-walk test which  shows baseline oxygen saturation of 96% at room air at rest with a heart  rate of 78, respiratory rate of 18, dyspnea-modified Cinthya scale of 1,  fatigue-modified Cinthya scale of 1. The patient did not have any  exertional hypoxemia on the study. The patient walked 1440 feet. The  patient's total expected 6-minute walk distance was 1443 feet. The  patient achieved 100% of the expected distance. On the basis of  6-minute walk test, the patient does not have any exertional hypoxemia  on optimal exercise. Please correlate clinically.         Shilo Londono MD    D: 2022 15:18:09       T: 2022 15:20:31     [email or web address]  Job#: [id number]     Doc#: [id number]    CC:

## 2023-05-11 ENCOUNTER — APPOINTMENT (OUTPATIENT)
Dept: GENERAL RADIOLOGY | Age: 63
End: 2023-05-11
Payer: MEDICAID

## 2023-05-11 ENCOUNTER — HOSPITAL ENCOUNTER (EMERGENCY)
Age: 63
Discharge: HOME | End: 2023-05-11
Payer: MEDICAID

## 2023-05-11 VITALS
TEMPERATURE: 98 F | BODY MASS INDEX: 37.15 KG/M2 | RESPIRATION RATE: 18 BRPM | SYSTOLIC BLOOD PRESSURE: 191 MMHG | HEIGHT: 65 IN | WEIGHT: 223 LBS | DIASTOLIC BLOOD PRESSURE: 80 MMHG | HEART RATE: 78 BPM | OXYGEN SATURATION: 97 %

## 2023-05-11 DIAGNOSIS — S20.211A CONTUSION OF RIB ON RIGHT SIDE, INITIAL ENCOUNTER: Primary | ICD-10-CM

## 2023-05-11 PROCEDURE — 99283 EMERGENCY DEPT VISIT LOW MDM: CPT

## 2023-05-11 PROCEDURE — 71046 X-RAY EXAM CHEST 2 VIEWS: CPT

## 2023-05-12 ENCOUNTER — OFFICE VISIT (OUTPATIENT)
Dept: FAMILY MEDICINE CLINIC | Age: 63
End: 2023-05-12
Payer: MEDICAID

## 2023-05-12 VITALS
WEIGHT: 204.8 LBS | OXYGEN SATURATION: 96 % | HEIGHT: 65 IN | SYSTOLIC BLOOD PRESSURE: 122 MMHG | HEART RATE: 69 BPM | BODY MASS INDEX: 34.12 KG/M2 | DIASTOLIC BLOOD PRESSURE: 80 MMHG

## 2023-05-12 DIAGNOSIS — S20.211S RIB CONTUSION, RIGHT, SEQUELA: Primary | ICD-10-CM

## 2023-05-12 PROCEDURE — 1036F TOBACCO NON-USER: CPT | Performed by: NURSE PRACTITIONER

## 2023-05-12 PROCEDURE — G8427 DOCREV CUR MEDS BY ELIG CLIN: HCPCS | Performed by: NURSE PRACTITIONER

## 2023-05-12 PROCEDURE — G8417 CALC BMI ABV UP PARAM F/U: HCPCS | Performed by: NURSE PRACTITIONER

## 2023-05-12 PROCEDURE — 99213 OFFICE O/P EST LOW 20 MIN: CPT | Performed by: NURSE PRACTITIONER

## 2023-05-12 PROCEDURE — 3017F COLORECTAL CA SCREEN DOC REV: CPT | Performed by: NURSE PRACTITIONER

## 2023-05-12 ASSESSMENT — PATIENT HEALTH QUESTIONNAIRE - PHQ9
1. LITTLE INTEREST OR PLEASURE IN DOING THINGS: 0
SUM OF ALL RESPONSES TO PHQ QUESTIONS 1-9: 0
SUM OF ALL RESPONSES TO PHQ9 QUESTIONS 1 & 2: 0
SUM OF ALL RESPONSES TO PHQ QUESTIONS 1-9: 0
2. FEELING DOWN, DEPRESSED OR HOPELESS: 0

## 2023-05-12 ASSESSMENT — ENCOUNTER SYMPTOMS
SHORTNESS OF BREATH: 1
GASTROINTESTINAL NEGATIVE: 1
WHEEZING: 0

## 2023-05-12 NOTE — ED NOTES
Patient left via personal vehicle to private residence in stable condition. Patients vitals were assessed before discharge and were stable. Patient verbalized understanding of discharge instructions, follow up and medications. RN explained information in discharge packet and patient verbalized they have no questions at this time. Patient left ER with all paperwork and belongings that RN is aware of.         Sofía Ruiz RN  05/11/23 [id number]

## 2023-05-12 NOTE — PROGRESS NOTES
Patient: Diandra Bustillos is a 61 y.o. female who presents today with the following Chief Complaint(s):  Chief Complaint   Patient presents with    Follow-Up from Hospital       Assessment:  Encounter Diagnosis   Name Primary? Rib contusion, right, sequela Yes       Plan:  1. Rib contusion, right, sequela  Patient advised to restart her incentive spirometer. She was given work note to be off today and tomorrow. Follow up with Pulmonary if worsening symptoms or difficulty breathing. Patient was advised is O2 is under 90 to go to the ER. Today in office she was 96 on room air. HPI  Patient was at the grocery store yesterday and a shopping care came and hit her on the right side of her ribs right were she had surgery last year. Patient states she went home and her oxygen was dropping to 93%. She called her lung specialist who suggested the ER to be evaluated. ER did an xray and showed no fractures. Diagnosed her with rib contusion. Patient has been taking Tylenol as needed. Current Outpatient Medications   Medication Sig Dispense Refill    acetaminophen (TYLENOL) 325 mg tablet Take 2 tablets by mouth every 6 hours as needed for Pain      nystatin (MYCOSTATIN) 659392 UNIT/GM powder Apply 3 times daily. (Patient taking differently: Apply topically 3 times daily as needed Apply 3 times daily.) 60 g 1     No current facility-administered medications for this visit. Patient's past medical history, surgical history, family history, medications,and allergies  were all reviewed and updated as appropriate today. Review of Systems   Constitutional: Negative. HENT: Negative. Respiratory:  Positive for shortness of breath. Negative for wheezing. Rib pain right side in area of her surgery   Cardiovascular: Negative. Gastrointestinal: Negative. Musculoskeletal: Negative. Neurological: Negative. Psychiatric/Behavioral: Negative. Physical Exam  Vitals reviewed.

## 2023-05-16 NOTE — ED PROVIDER NOTES
[facility]  ED  EMERGENCY DEPARTMENT ENCOUNTER        Pt Name: Cherri Guzmán  MRN: [id number]  [place] 1960  Date of evaluation: 2023  Provider: KIMBERLY Castillo CNP  PCP: KIMBERLY Toledo CNP        HARRIET. I have evaluated this patient. My supervising physician was available for consultation. CHIEF COMPLAINT       Chief Complaint   Patient presents with    Other     Had lung surgery 1 year ago and was hit by a shopping cart today and states she called her md and they wanted her to have a xray       HISTORY OF PRESENT ILLNESS: 1 or more Elements     History From: the patinet  Limitations to history : None    Cherri Guzmán is a 61 y.o. female who presents to the emergency department today with complaints of right side pain. Patient states that she was pushing a shopping cart out of the grocery center today when the wheels locked up and she essentially ran into the cart hitting her right side, she states that she has had lung surgery about a year and a half ago and wanted to make sure she did not injure anything. She is having pain when she takes a deep breath, with palpation or movement. No other injuries, here for further evaluation. Nursing Notes were all reviewed and agreed with or any disagreements were addressed in the HPI. REVIEW OF SYSTEMS :      Review of Systems    Positives and Pertinent negatives as per HPI.      SURGICAL HISTORY     Past Surgical History:   Procedure Laterality Date     SECTION      x2 - \"emergency\"    COLONOSCOPY      LEEP      x2    LUNG SURGERY Right 2022    THORACOSCOPY Right 2022    RIGHT VIDEO ASSISTED THORACOSCOPIC SURGERY WITH WEDGE RESECTION  OF RIGHT LOWER LOBE WITH INTERCOSTAL NERVE BLOCK AND BRONCHOSCOPY performed by Pj Bolivar MD at [facility] [place] [address]       Discharge Medication List as of 2023 10:58 PM        CONTINUE these medications which have NOT CHANGED    Details

## 2023-05-30 ENCOUNTER — TELEPHONE (OUTPATIENT)
Dept: PULMONOLOGY | Age: 63
End: 2023-05-30

## 2023-05-30 ENCOUNTER — COMMUNITY OUTREACH (OUTPATIENT)
Dept: FAMILY MEDICINE CLINIC | Age: 63
End: 2023-05-30

## 2023-05-30 NOTE — TELEPHONE ENCOUNTER
Patient called and stated she was at the [employer] store on 05/11/2023   She had an accident with a shopping cart hitting her chest and causing a contusion of the rib right sided  Patient states she is having difficulty breathing and O2 is dropping to the low 90's at times  Patient wants your advice   She had her last CT chest on 12/16/2022  She is scheduled to see you and have a repeat CT on 06/27/2023   Patient is concerned from the previous because the shopping cart hit her chest at the same location as her surgery 1 year ago   Patient is wanting your advice if she can move the CT Chest up and get it completed sooner   Pt wants doctor permission to get the test completed sooner

## 2023-05-31 ENCOUNTER — TELEPHONE (OUTPATIENT)
Dept: FAMILY MEDICINE CLINIC | Age: 63
End: 2023-05-31

## 2023-05-31 DIAGNOSIS — S20.219D CONTUSION OF RIB, UNSPECIFIED LATERALITY, SUBSEQUENT ENCOUNTER: Primary | ICD-10-CM

## 2023-05-31 NOTE — TELEPHONE ENCOUNTER
Patient called to see if she can still try the Homeopathic patch that Caleb Dials spoke with her about. She is having a lot of pain and some stomach upset from taking tylenol and the pain. Patient's CT scan was moved up to 6/6/23.

## 2023-06-01 RX ORDER — LIDOCAINE 50 MG/G
1 PATCH TOPICAL DAILY
Qty: 10 PATCH | Refills: 0 | Status: SHIPPED | OUTPATIENT
Start: 2023-06-01 | End: 2023-06-11

## 2023-06-06 ENCOUNTER — HOSPITAL ENCOUNTER (OUTPATIENT)
Dept: CT IMAGING | Age: 63
Discharge: HOME | End: 2023-06-06
Attending: INTERNAL MEDICINE
Payer: MEDICAID

## 2023-06-06 DIAGNOSIS — R59.0 MEDIASTINAL ADENOPATHY: ICD-10-CM

## 2023-06-06 DIAGNOSIS — B39.9 HISTOPLASMOSIS: ICD-10-CM

## 2023-06-06 PROCEDURE — 71250 CT THORAX DX C-: CPT

## 2023-06-14 PROBLEM — R07.81 PLEURITIC CHEST PAIN: Status: ACTIVE | Noted: 2023-06-14

## 2023-08-02 ENCOUNTER — HOSPITAL ENCOUNTER (OUTPATIENT)
Dept: CT IMAGING | Age: 63
Discharge: HOME | End: 2023-08-02
Payer: MEDICAID

## 2023-08-02 DIAGNOSIS — R91.1 LUNG NODULE: ICD-10-CM

## 2023-08-02 PROCEDURE — 71250 CT THORAX DX C-: CPT

## 2023-08-08 NOTE — PROGRESS NOTES
[facility] Pulmonary Follow-up  [address] [facility], [place], [address]  [address] (: 1960 ) is a 61 y.o. female here for an evaluation of   Chief Complaint   Patient presents with    Follow-up     Lung nodule 8 wk    Results     CT         SUBJECTIVE/OBJECTIVE:  Patient is 40-year-old female with significant past medical history of histoplasmosis, lung nodule that presents to [facility] pulmonary clinic for evaluation of lung nodule. Patient was found to have a right upper lobe groundglass lung nodule and had repeat CT scans. She is here for the results today. She has no complaints today. She denies any fever, chills, shortness of breath, cough, nausea, vomiting, abdominal pain. She has pet birds at home and is trying to move them out of her home. Per Dr. Marylin Khan:  Patient is a 40-year-old female who underwent MyChart visit to discuss the clinical status and the CT chest, patient states that she was hit by a cart in [facility] [address] [facility] [place] and patient having significant shortness of breath, patient also states that her oxygen had dropped to 93%, patient also was having shortness of breath, patient states that she continues to have some pleuritic chest pain, patient does not have any hemoptysis, patient also states that bending forward causes her to have shortness of breath, patient also feels somewhat dizzy with change of posture, patient does not have any significant expectoration, patient does not have any fever no chills, patient does not have any abdominal symptoms of concern, no increasing leg edema per se, patient does not have any other pertinent review of system of concern      Review of Systems   Constitutional:  Negative for activity change, appetite change, chills, diaphoresis and fatigue. HENT:  Negative for congestion, sore throat and trouble swallowing. Eyes:  Negative for pain, discharge, redness and itching.    Respiratory:  Negative for cough, shortness of breath,

## 2023-08-09 ENCOUNTER — OFFICE VISIT (OUTPATIENT)
Dept: PULMONOLOGY | Age: 63
End: 2023-08-09
Payer: MEDICAID

## 2023-08-09 VITALS
HEIGHT: 65 IN | OXYGEN SATURATION: 97 % | BODY MASS INDEX: 34.35 KG/M2 | TEMPERATURE: 97.3 F | WEIGHT: 206.2 LBS | RESPIRATION RATE: 16 BRPM | SYSTOLIC BLOOD PRESSURE: 151 MMHG | DIASTOLIC BLOOD PRESSURE: 82 MMHG | HEART RATE: 70 BPM

## 2023-08-09 DIAGNOSIS — R59.0 MEDIASTINAL ADENOPATHY: ICD-10-CM

## 2023-08-09 DIAGNOSIS — B39.9 HISTOPLASMOSIS: ICD-10-CM

## 2023-08-09 DIAGNOSIS — E66.9 OBESITY (BMI 30-39.9): ICD-10-CM

## 2023-08-09 DIAGNOSIS — R91.1 LUNG NODULE: Primary | ICD-10-CM

## 2023-08-09 PROCEDURE — G8427 DOCREV CUR MEDS BY ELIG CLIN: HCPCS | Performed by: STUDENT IN AN ORGANIZED HEALTH CARE EDUCATION/TRAINING PROGRAM

## 2023-08-09 PROCEDURE — G8417 CALC BMI ABV UP PARAM F/U: HCPCS | Performed by: STUDENT IN AN ORGANIZED HEALTH CARE EDUCATION/TRAINING PROGRAM

## 2023-08-09 PROCEDURE — 1036F TOBACCO NON-USER: CPT | Performed by: STUDENT IN AN ORGANIZED HEALTH CARE EDUCATION/TRAINING PROGRAM

## 2023-08-09 PROCEDURE — 3017F COLORECTAL CA SCREEN DOC REV: CPT | Performed by: STUDENT IN AN ORGANIZED HEALTH CARE EDUCATION/TRAINING PROGRAM

## 2023-08-09 PROCEDURE — 99213 OFFICE O/P EST LOW 20 MIN: CPT | Performed by: STUDENT IN AN ORGANIZED HEALTH CARE EDUCATION/TRAINING PROGRAM

## 2023-08-09 ASSESSMENT — ENCOUNTER SYMPTOMS
SORE THROAT: 0
EYE REDNESS: 0
NAUSEA: 0
CONSTIPATION: 0
VOMITING: 0
COLOR CHANGE: 0
ABDOMINAL DISTENTION: 0
EYE ITCHING: 0
STRIDOR: 0
SHORTNESS OF BREATH: 0
COUGH: 0
ABDOMINAL PAIN: 0
DIARRHEA: 0
EYE DISCHARGE: 0
BACK PAIN: 0
TROUBLE SWALLOWING: 0
WHEEZING: 0
EYE PAIN: 0

## 2023-08-09 NOTE — PATIENT INSTRUCTIONS
Remember to bring a list of pulmonary medications and any CPAP or BiPAP machines to your next appointment with the office. Please keep all of your future appointments scheduled by [facility] Pulmonary office. Out of respect for other patients and providers, you may be asked to reschedule your appointment if you arrive later than your scheduled appointment time. Appointments cancelled less than 24hrs in advance will be considered a no show. Patients with three missed appointments within 1 year or four missed appointments within 2 years can be dismissed from the practice. Please be aware that our physicians are required to work in the Intensive Care Unit at [facility].  Your appointment may need to be rescheduled if they are designated to work during your appointment time. You may receive a survey regarding the care you received during your visit. Your input is valuable to us. We encourage you to complete and return your survey. We hope you will choose us in the future for your healthcare needs. Pt instructed of all future appointment dates & times, including radiology, labs, procedures & referrals. If procedures were scheduled preparation instructions provided. Instructions on future appointments with [facility] Pulmonary were given. MA Communication:   The following orders are received by verbal communication from Allie Laureano MD    Orders include:  CT Wo Con 6 moth follow up with Dr Taras Layton

## 2023-08-09 NOTE — PROGRESS NOTES
MA Communication:   The following orders are received by verbal communication from Ketan Espinoza MD    Orders include:  CT Wo Con 6 moth follow up with Dr Pak Milder

## 2023-12-14 ENCOUNTER — OFFICE VISIT (OUTPATIENT)
Dept: FAMILY MEDICINE CLINIC | Age: 63
End: 2023-12-14
Payer: MEDICAID

## 2023-12-14 VITALS
HEIGHT: 65 IN | OXYGEN SATURATION: 97 % | BODY MASS INDEX: 34.09 KG/M2 | HEART RATE: 72 BPM | SYSTOLIC BLOOD PRESSURE: 136 MMHG | DIASTOLIC BLOOD PRESSURE: 82 MMHG | WEIGHT: 204.6 LBS

## 2023-12-14 DIAGNOSIS — R10.31 RIGHT GROIN PAIN: Primary | ICD-10-CM

## 2023-12-14 DIAGNOSIS — T14.8XXA MUSCLE STRAIN: ICD-10-CM

## 2023-12-14 PROCEDURE — 3017F COLORECTAL CA SCREEN DOC REV: CPT | Performed by: NURSE PRACTITIONER

## 2023-12-14 PROCEDURE — G8417 CALC BMI ABV UP PARAM F/U: HCPCS | Performed by: NURSE PRACTITIONER

## 2023-12-14 PROCEDURE — G8427 DOCREV CUR MEDS BY ELIG CLIN: HCPCS | Performed by: NURSE PRACTITIONER

## 2023-12-14 PROCEDURE — G8484 FLU IMMUNIZE NO ADMIN: HCPCS | Performed by: NURSE PRACTITIONER

## 2023-12-14 PROCEDURE — 99213 OFFICE O/P EST LOW 20 MIN: CPT | Performed by: NURSE PRACTITIONER

## 2023-12-14 PROCEDURE — 1036F TOBACCO NON-USER: CPT | Performed by: NURSE PRACTITIONER

## 2023-12-14 RX ORDER — ONDANSETRON 4 MG/1
4 TABLET, FILM COATED ORAL 3 TIMES DAILY PRN
Qty: 30 TABLET | Refills: 0 | Status: SHIPPED | OUTPATIENT
Start: 2023-12-14

## 2023-12-14 RX ORDER — TIZANIDINE 2 MG/1
2 TABLET ORAL NIGHTLY PRN
Qty: 7 TABLET | Refills: 0 | Status: SHIPPED | OUTPATIENT
Start: 2023-12-14 | End: 2023-12-21

## 2023-12-14 ASSESSMENT — PATIENT HEALTH QUESTIONNAIRE - PHQ9
2. FEELING DOWN, DEPRESSED OR HOPELESS: 0
SUM OF ALL RESPONSES TO PHQ9 QUESTIONS 1 & 2: 0
SUM OF ALL RESPONSES TO PHQ QUESTIONS 1-9: 0
1. LITTLE INTEREST OR PLEASURE IN DOING THINGS: 0

## 2023-12-14 NOTE — PROGRESS NOTES
Patient: Fredy Melvin is a 61 y.o. female who presents today with the following Chief Complaint(s):  Chief Complaint   Patient presents with    Kathleen Parnellter Monday- pulled groin muscle- nauseous from pain        Assessment:  Encounter Diagnoses   Name Primary? Right groin pain Yes    Muscle strain        Plan:  1. Right groin pain  Treat with muscle relaxer. Cautioned on side effects of drowsiness and patient verbalized her understanding. Will also given zofran as needed. - ondansetron (ZOFRAN) 4 MG tablet; Take 1 tablet by mouth 3 times daily as needed for Nausea or Vomiting  Dispense: 30 tablet; Refill: 0  - tiZANidine (ZANAFLEX) 2 MG tablet; Take 1 tablet by mouth nightly as needed (Muscle pain)  Dispense: 7 tablet; Refill: 0    2. Muscle strain  Continue to ice and treat with medications listed. If no improvement patient will follow up. - ondansetron (ZOFRAN) 4 MG tablet; Take 1 tablet by mouth 3 times daily as needed for Nausea or Vomiting  Dispense: 30 tablet; Refill: 0  - tiZANidine (ZANAFLEX) 2 MG tablet; Take 1 tablet by mouth nightly as needed (Muscle pain)  Dispense: 7 tablet; Refill: 0      HPI  Patient presents today with concerns of right groin pain. She injured it a few weeks ago and then she fell on Monday and she states pulled the groin muscle again. She states it is painful and she also feels pain in the back of her leg but she thinks she hit it on the toilet when she fell this last time. She is feeling nauseated and thinks the pain is causing. She can only tolerate tylenol. She states it causes her to feel drowsy. She is sensitive to medications but willing to try something else. She has been icing the area as well.        Current Outpatient Medications   Medication Sig Dispense Refill    ondansetron (ZOFRAN) 4 MG tablet Take 1 tablet by mouth 3 times daily as needed for Nausea or Vomiting 30 tablet 0    tiZANidine (ZANAFLEX) 2 MG tablet Take 1 tablet by mouth nightly as needed (Muscle

## 2024-02-06 ENCOUNTER — HOSPITAL ENCOUNTER (OUTPATIENT)
Dept: CT IMAGING | Age: 64
Discharge: HOME | End: 2024-02-06
Payer: MEDICAID

## 2024-02-06 ENCOUNTER — OFFICE VISIT (OUTPATIENT)
Dept: PULMONOLOGY | Age: 64
End: 2024-02-06
Payer: MEDICAID

## 2024-02-06 VITALS
HEART RATE: 65 BPM | SYSTOLIC BLOOD PRESSURE: 149 MMHG | RESPIRATION RATE: 16 BRPM | TEMPERATURE: 96.9 F | DIASTOLIC BLOOD PRESSURE: 82 MMHG | HEIGHT: 65 IN | BODY MASS INDEX: 34.32 KG/M2 | OXYGEN SATURATION: 98 % | WEIGHT: 206 LBS

## 2024-02-06 DIAGNOSIS — B39.9 HISTOPLASMOSIS: ICD-10-CM

## 2024-02-06 DIAGNOSIS — R91.1 LUNG NODULE: ICD-10-CM

## 2024-02-06 DIAGNOSIS — Z86.16 PERSONAL HISTORY OF COVID-19: ICD-10-CM

## 2024-02-06 DIAGNOSIS — E66.9 OBESITY (BMI 30-39.9): ICD-10-CM

## 2024-02-06 DIAGNOSIS — R91.1 LUNG NODULE: Primary | ICD-10-CM

## 2024-02-06 DIAGNOSIS — R59.0 MEDIASTINAL ADENOPATHY: ICD-10-CM

## 2024-02-06 PROCEDURE — 99213 OFFICE O/P EST LOW 20 MIN: CPT | Performed by: INTERNAL MEDICINE

## 2024-02-06 PROCEDURE — G8427 DOCREV CUR MEDS BY ELIG CLIN: HCPCS | Performed by: INTERNAL MEDICINE

## 2024-02-06 PROCEDURE — 3017F COLORECTAL CA SCREEN DOC REV: CPT | Performed by: INTERNAL MEDICINE

## 2024-02-06 PROCEDURE — 71250 CT THORAX DX C-: CPT

## 2024-02-06 PROCEDURE — G8417 CALC BMI ABV UP PARAM F/U: HCPCS | Performed by: INTERNAL MEDICINE

## 2024-02-06 PROCEDURE — G8484 FLU IMMUNIZE NO ADMIN: HCPCS | Performed by: INTERNAL MEDICINE

## 2024-02-06 PROCEDURE — 1036F TOBACCO NON-USER: CPT | Performed by: INTERNAL MEDICINE

## 2024-02-06 NOTE — PROGRESS NOTES
MA Communication:  The following orders are received by verbal communication from Mauricio Morales MD    Orders include:    PRN    
Tissues/Bones: No suspicious osseous lesions     IMPRESSION:  1.  New 1.7 cm ground-glass nodule in the right lung apex.  The differential  includes infection, inflammation, as well as a neoplasm.  However, infection  is favored.     2.  Stable appearance of a prominent subcarinal lymph node measuring 10 mm in  short axis diameter.      CT OF THE CHEST WITHOUT CONTRAST 2/6/2024 8:27 am     TECHNIQUE:  CT of the chest was performed without the administration of intravenous  contrast. Multiplanar reformatted images are provided for review. Automated  exposure control, iterative reconstruction, and/or weight based adjustment of  the mA/kV was utilized to reduce the radiation dose to as low as reasonably  achievable.     COMPARISON:  08/02/2023     HISTORY:  ORDERING SYSTEM PROVIDED HISTORY: Lung nodule  TECHNOLOGIST PROVIDED HISTORY:  Reason for exam:->mediastinal lymphadenopathy  Reason for Exam: long nodules, mediatinal lymphadenopathy  Relevant Medical/Surgical History: lower right lung surgery 5/2022; sob since  surgery;     FINDINGS:  Mediastinum: The unenhanced heart is unremarkable.  There are no enlarged  thoracic lymph nodes.     Lungs/pleura: The tracheobronchial tree is patent.  There is no pneumothorax  or pleural effusion.  No change in the linear airspace disease throughout the  bilateral lungs favoring scarring.  Status post right lower lobe wedge  resection.     There is improved aeration of the lungs with complete resolution of the  previously noted right upper lobe bronchiolitis.  No change in the separate  2-3 mm solid right pulmonary nodules.  No new pulmonary nodules have  developed.     Upper Abdomen: A small hiatal hernia is noted.  No change in the geographic  hepatic steatosis.     Soft Tissues/Bones: Degenerative changes involve the thoracic spine.     IMPRESSION:  1. Complete resolution of the right upper lobe bronchiolitis.  2. Unchanged solid subcentimeter right pulmonary nodules bear

## 2024-05-23 ENCOUNTER — OFFICE VISIT (OUTPATIENT)
Dept: FAMILY MEDICINE CLINIC | Age: 64
End: 2024-05-23
Payer: MEDICAID

## 2024-05-23 VITALS
WEIGHT: 212.2 LBS | DIASTOLIC BLOOD PRESSURE: 82 MMHG | HEART RATE: 96 BPM | OXYGEN SATURATION: 96 % | BODY MASS INDEX: 35.35 KG/M2 | HEIGHT: 65 IN | SYSTOLIC BLOOD PRESSURE: 138 MMHG

## 2024-05-23 DIAGNOSIS — E11.9 TYPE 2 DIABETES MELLITUS WITHOUT COMPLICATION, WITHOUT LONG-TERM CURRENT USE OF INSULIN (HCC): ICD-10-CM

## 2024-05-23 DIAGNOSIS — J30.2 SEASONAL ALLERGIES: Primary | ICD-10-CM

## 2024-05-23 PROBLEM — J96.01 ACUTE RESPIRATORY FAILURE WITH HYPOXIA (HCC): Status: RESOLVED | Noted: 2021-12-16 | Resolved: 2024-05-23

## 2024-05-23 LAB — HBA1C MFR BLD: 6.2 %

## 2024-05-23 PROCEDURE — 83036 HEMOGLOBIN GLYCOSYLATED A1C: CPT | Performed by: NURSE PRACTITIONER

## 2024-05-23 PROCEDURE — G8417 CALC BMI ABV UP PARAM F/U: HCPCS | Performed by: NURSE PRACTITIONER

## 2024-05-23 PROCEDURE — 99214 OFFICE O/P EST MOD 30 MIN: CPT | Performed by: NURSE PRACTITIONER

## 2024-05-23 PROCEDURE — 3017F COLORECTAL CA SCREEN DOC REV: CPT | Performed by: NURSE PRACTITIONER

## 2024-05-23 PROCEDURE — 3044F HG A1C LEVEL LT 7.0%: CPT | Performed by: NURSE PRACTITIONER

## 2024-05-23 PROCEDURE — 1036F TOBACCO NON-USER: CPT | Performed by: NURSE PRACTITIONER

## 2024-05-23 PROCEDURE — 2022F DILAT RTA XM EVC RTNOPTHY: CPT | Performed by: NURSE PRACTITIONER

## 2024-05-23 PROCEDURE — G8427 DOCREV CUR MEDS BY ELIG CLIN: HCPCS | Performed by: NURSE PRACTITIONER

## 2024-05-23 RX ORDER — METHYLPREDNISOLONE 4 MG/1
TABLET ORAL
Qty: 21 TABLET | Refills: 0 | Status: SHIPPED | OUTPATIENT
Start: 2024-05-23

## 2024-05-23 SDOH — ECONOMIC STABILITY: FOOD INSECURITY: WITHIN THE PAST 12 MONTHS, THE FOOD YOU BOUGHT JUST DIDN'T LAST AND YOU DIDN'T HAVE MONEY TO GET MORE.: NEVER TRUE

## 2024-05-23 SDOH — ECONOMIC STABILITY: INCOME INSECURITY: HOW HARD IS IT FOR YOU TO PAY FOR THE VERY BASICS LIKE FOOD, HOUSING, MEDICAL CARE, AND HEATING?: SOMEWHAT HARD

## 2024-05-23 SDOH — ECONOMIC STABILITY: HOUSING INSECURITY
IN THE LAST 12 MONTHS, WAS THERE A TIME WHEN YOU DID NOT HAVE A STEADY PLACE TO SLEEP OR SLEPT IN A SHELTER (INCLUDING NOW)?: NO

## 2024-05-23 SDOH — ECONOMIC STABILITY: FOOD INSECURITY: WITHIN THE PAST 12 MONTHS, YOU WORRIED THAT YOUR FOOD WOULD RUN OUT BEFORE YOU GOT MONEY TO BUY MORE.: NEVER TRUE

## 2024-05-23 ASSESSMENT — PATIENT HEALTH QUESTIONNAIRE - PHQ9
1. LITTLE INTEREST OR PLEASURE IN DOING THINGS: NOT AT ALL
2. FEELING DOWN, DEPRESSED OR HOPELESS: NOT AT ALL
SUM OF ALL RESPONSES TO PHQ9 QUESTIONS 1 & 2: 0
SUM OF ALL RESPONSES TO PHQ QUESTIONS 1-9: 0

## 2024-05-23 ASSESSMENT — ENCOUNTER SYMPTOMS
SINUS PAIN: 0
SHORTNESS OF BREATH: 1
SINUS PRESSURE: 0
COUGH: 1
GASTROINTESTINAL NEGATIVE: 1
RHINORRHEA: 1

## 2024-05-23 NOTE — PROGRESS NOTES
Patient: Louise Chauhan is a 64 y.o. female who presents today with the following Chief Complaint(s):  Chief Complaint   Patient presents with    Cough    Shortness of Breath     Started yesterday       Assessment:  Encounter Diagnoses   Name Primary?    Seasonal allergies Yes    Type 2 diabetes mellitus without complication, without long-term current use of insulin ([facility])        Plan:  1. Seasonal allergies  Treat with OTC flonase and a steroid pack was sent in to help with shortness of breath and inflammation.     2. Type 2 diabetes mellitus without complication, without long-term current use of insulin ([facility])  A1C is 6.2. continue to control with diet.   - POCT glycosylated hemoglobin (Hb A1C)      HPI  Patient presents today with concerns of shortness of breath, cough and some nasal drainage. She states her throat feels dry in the mornings. She states symptoms started all of the sudden after being outside cutting the grass. She states she has been taking some coricidin and mucinex as needed.  She also has a history of type 2 diabetes. She is due for an A1C. She has been controlling with diet. She is not currently on any medications.       Current Outpatient Medications   Medication Sig Dispense Refill    methylPREDNISolone (MEDROL, ANKIT,) 4 MG tablet Take 6 tablets all at once on day 1, 5 tablets on day 2, 4 tablets on day 3, 3 tablets on day 4 and 2 tablets on day 5 and 1 tablet on day 6. 21 tablet 0    acetaminophen (TYLENOL) 325 mg tablet Take 2 tablets by mouth every 6 hours as needed for Pain       No current facility-administered medications for this visit.       Patient's past medical history, surgical history, family history, medications,and allergies  were all reviewed and updated as appropriate today.      Review of Systems   Constitutional: Negative.    HENT:  Positive for congestion and rhinorrhea. Negative for sinus pressure and sinus pain.    Respiratory:  Positive for cough and shortness of breath.

## 2024-07-01 ENCOUNTER — COMMUNITY OUTREACH (OUTPATIENT)
Dept: FAMILY MEDICINE CLINIC | Age: 64
End: 2024-07-01

## 2025-01-14 ENCOUNTER — OFFICE VISIT (OUTPATIENT)
Dept: FAMILY MEDICINE CLINIC | Age: 65
End: 2025-01-14
Payer: MEDICAID

## 2025-01-14 VITALS
SYSTOLIC BLOOD PRESSURE: 136 MMHG | BODY MASS INDEX: 35.39 KG/M2 | HEIGHT: 65 IN | HEART RATE: 73 BPM | WEIGHT: 212.4 LBS | DIASTOLIC BLOOD PRESSURE: 78 MMHG | OXYGEN SATURATION: 98 %

## 2025-01-14 DIAGNOSIS — E11.9 TYPE 2 DIABETES MELLITUS WITHOUT COMPLICATION, WITHOUT LONG-TERM CURRENT USE OF INSULIN (HCC): ICD-10-CM

## 2025-01-14 DIAGNOSIS — Z12.31 SCREENING MAMMOGRAM FOR BREAST CANCER: ICD-10-CM

## 2025-01-14 DIAGNOSIS — M79.604 RIGHT LEG PAIN: Primary | ICD-10-CM

## 2025-01-14 DIAGNOSIS — Z78.0 POST-MENOPAUSAL: ICD-10-CM

## 2025-01-14 PROBLEM — J12.82 PNEUMONIA DUE TO COVID-19 VIRUS: Status: RESOLVED | Noted: 2021-12-16 | Resolved: 2025-01-14

## 2025-01-14 PROBLEM — U07.1 PNEUMONIA DUE TO COVID-19 VIRUS: Status: RESOLVED | Noted: 2021-12-16 | Resolved: 2025-01-14

## 2025-01-14 PROBLEM — J12.82 2019 NOVEL CORONAVIRUS-INFECTED PNEUMONIA (NCIP): Status: RESOLVED | Noted: 2021-12-16 | Resolved: 2025-01-14

## 2025-01-14 PROBLEM — U07.1 2019 NOVEL CORONAVIRUS-INFECTED PNEUMONIA (NCIP): Status: RESOLVED | Noted: 2021-12-16 | Resolved: 2025-01-14

## 2025-01-14 LAB
ALBUMIN SERPL-MCNC: 4.3 G/DL (ref 3.4–5)
ALBUMIN/GLOB SERPL: 1.4 {RATIO} (ref 1.1–2.2)
ALP SERPL-CCNC: 134 U/L (ref 40–129)
ALT SERPL-CCNC: 18 U/L (ref 10–40)
ANION GAP SERPL CALCULATED.3IONS-SCNC: 13 MMOL/L (ref 3–16)
AST SERPL-CCNC: 21 U/L (ref 15–37)
BILIRUB SERPL-MCNC: <0.2 MG/DL (ref 0–1)
BUN SERPL-MCNC: 13 MG/DL (ref 7–20)
CALCIUM SERPL-MCNC: 9.6 MG/DL (ref 8.3–10.6)
CHLORIDE SERPL-SCNC: 106 MMOL/L (ref 99–110)
CHOLEST SERPL-MCNC: 278 MG/DL (ref 0–199)
CO2 SERPL-SCNC: 24 MMOL/L (ref 21–32)
CREAT SERPL-MCNC: 0.6 MG/DL (ref 0.6–1.2)
CREAT UR-MCNC: 178 MG/DL (ref 28–259)
EST. AVERAGE GLUCOSE BLD GHB EST-MCNC: 131.2 MG/DL
GFR SERPLBLD CREATININE-BSD FMLA CKD-EPI: >90 ML/MIN/{1.73_M2}
GLUCOSE SERPL-MCNC: 96 MG/DL (ref 70–99)
HBA1C MFR BLD: 6.2 %
HDLC SERPL-MCNC: 36 MG/DL (ref 40–60)
LDL CHOLESTEROL: 198 MG/DL
MICROALBUMIN UR DL<=1MG/L-MCNC: <1.2 MG/DL
MICROALBUMIN/CREAT UR: NORMAL MG/G (ref 0–30)
POTASSIUM SERPL-SCNC: 4.1 MMOL/L (ref 3.5–5.1)
PROT SERPL-MCNC: 7.3 G/DL (ref 6.4–8.2)
SODIUM SERPL-SCNC: 143 MMOL/L (ref 136–145)
TRIGL SERPL-MCNC: 219 MG/DL (ref 0–150)
VLDLC SERPL CALC-MCNC: 44 MG/DL

## 2025-01-14 PROCEDURE — 1036F TOBACCO NON-USER: CPT | Performed by: NURSE PRACTITIONER

## 2025-01-14 PROCEDURE — 2022F DILAT RTA XM EVC RTNOPTHY: CPT | Performed by: NURSE PRACTITIONER

## 2025-01-14 PROCEDURE — 99214 OFFICE O/P EST MOD 30 MIN: CPT | Performed by: NURSE PRACTITIONER

## 2025-01-14 PROCEDURE — G8400 PT W/DXA NO RESULTS DOC: HCPCS | Performed by: NURSE PRACTITIONER

## 2025-01-14 PROCEDURE — G8417 CALC BMI ABV UP PARAM F/U: HCPCS | Performed by: NURSE PRACTITIONER

## 2025-01-14 PROCEDURE — 1090F PRES/ABSN URINE INCON ASSESS: CPT | Performed by: NURSE PRACTITIONER

## 2025-01-14 PROCEDURE — G8427 DOCREV CUR MEDS BY ELIG CLIN: HCPCS | Performed by: NURSE PRACTITIONER

## 2025-01-14 PROCEDURE — 3046F HEMOGLOBIN A1C LEVEL >9.0%: CPT | Performed by: NURSE PRACTITIONER

## 2025-01-14 PROCEDURE — 1123F ACP DISCUSS/DSCN MKR DOCD: CPT | Performed by: NURSE PRACTITIONER

## 2025-01-14 PROCEDURE — 3017F COLORECTAL CA SCREEN DOC REV: CPT | Performed by: NURSE PRACTITIONER

## 2025-01-14 RX ORDER — METHYLPREDNISOLONE 4 MG/1
TABLET ORAL
Qty: 21 TABLET | Refills: 0 | Status: SHIPPED | OUTPATIENT
Start: 2025-01-14

## 2025-01-14 SDOH — ECONOMIC STABILITY: INCOME INSECURITY: IN THE LAST 12 MONTHS, WAS THERE A TIME WHEN YOU WERE NOT ABLE TO PAY THE MORTGAGE OR RENT ON TIME?: NO

## 2025-01-14 SDOH — ECONOMIC STABILITY: FOOD INSECURITY: WITHIN THE PAST 12 MONTHS, YOU WORRIED THAT YOUR FOOD WOULD RUN OUT BEFORE YOU GOT MONEY TO BUY MORE.: NEVER TRUE

## 2025-01-14 SDOH — ECONOMIC STABILITY: FOOD INSECURITY: WITHIN THE PAST 12 MONTHS, THE FOOD YOU BOUGHT JUST DIDN'T LAST AND YOU DIDN'T HAVE MONEY TO GET MORE.: NEVER TRUE

## 2025-01-14 SDOH — ECONOMIC STABILITY: TRANSPORTATION INSECURITY
IN THE PAST 12 MONTHS, HAS THE LACK OF TRANSPORTATION KEPT YOU FROM MEDICAL APPOINTMENTS OR FROM GETTING MEDICATIONS?: NO

## 2025-01-14 SDOH — ECONOMIC STABILITY: TRANSPORTATION INSECURITY
IN THE PAST 12 MONTHS, HAS LACK OF TRANSPORTATION KEPT YOU FROM MEETINGS, WORK, OR FROM GETTING THINGS NEEDED FOR DAILY LIVING?: NO

## 2025-01-14 ASSESSMENT — PATIENT HEALTH QUESTIONNAIRE - PHQ9
2. FEELING DOWN, DEPRESSED OR HOPELESS: NOT AT ALL
SUM OF ALL RESPONSES TO PHQ QUESTIONS 1-9: 0
SUM OF ALL RESPONSES TO PHQ9 QUESTIONS 1 & 2: 0
SUM OF ALL RESPONSES TO PHQ QUESTIONS 1-9: 0
1. LITTLE INTEREST OR PLEASURE IN DOING THINGS: NOT AT ALL
SUM OF ALL RESPONSES TO PHQ9 QUESTIONS 1 & 2: 0
2. FEELING DOWN, DEPRESSED OR HOPELESS: NOT AT ALL
1. LITTLE INTEREST OR PLEASURE IN DOING THINGS: NOT AT ALL

## 2025-01-14 ASSESSMENT — ENCOUNTER SYMPTOMS
RESPIRATORY NEGATIVE: 1
GASTROINTESTINAL NEGATIVE: 1

## 2025-01-14 NOTE — ASSESSMENT & PLAN NOTE
Check fasting blood work, continue diet control.     Orders:    Lipid, Fasting; Future    Hemoglobin A1C; Future    Comprehensive Metabolic Panel; Future    Albumin/Creatinine Ratio, Urine

## 2025-01-14 NOTE — PROGRESS NOTES
Patient: Louise Chauhan is a 65 y.o. female who presents today with the following Chief Complaint(s):  Chief Complaint   Patient presents with    Leg Pain    Hip Pain       Assessment:  Encounter Diagnoses   Name Primary?    Right leg pain Yes    Type 2 diabetes mellitus without complication, without long-term current use of insulin ([facility])     Screening mammogram for breast cancer     Post-menopausal        Plan:  Assessment & Plan  Right leg pain    Will refer to physical therapy for further evaluation. Trial steroid pack to decrease inflammation.      Orders:    [facility]    methylPREDNISolone (MEDROL, ANKIT,) 4 MG tablet; Take 6 tablets all at once on day 1, 5 tablets on day 2, 4 tablets on day 3, 3 tablets on day 4 and 2 tablets on day 5 and 1 tablet on day 6.    Type 2 diabetes mellitus without complication, without long-term current use of insulin ([facility])    Check fasting blood work, continue diet control.     Orders:    Lipid, Fasting; Future    Hemoglobin A1C; Future    Comprehensive Metabolic Panel; Future    Albumin/Creatinine Ratio, Urine    Screening mammogram for breast cancer    Mammogram ordered today    Orders:    GUADALUPE DIGITAL SCREEN W OR WO CAD BILATERAL; Future    Post-menopausal       Orders:    DEXA BONE DENSITY AXIAL SKELETON; Future         HPI  Patient presents today with concerns of right leg/groin pain, hip pain.  She states that she pulled a muscle in her groin about 3 months ago.  She has been taking it easy, taking Tylenol and icing the area and it was slowly improving however 2 months ago she tripped and states that it worsened the pain at that time.  Since then she has been trying to rest, elevate the leg and take Tylenol and ice it.  She states she is able to stand on her leg but notices more pain after working longer shifts.  She is also following up on type 2 diabetes. She states she is fasting today. She is currently controlling with diet.

## 2025-01-16 ENCOUNTER — HOSPITAL ENCOUNTER (OUTPATIENT)
Dept: PHYSICAL THERAPY | Age: 65
Setting detail: THERAPIES SERIES
Discharge: HOME | End: 2025-01-16
Payer: MEDICAID

## 2025-01-16 DIAGNOSIS — R26.2 DIFFICULTY WALKING: ICD-10-CM

## 2025-01-16 DIAGNOSIS — M25.651 HIP STIFFNESS, RIGHT: ICD-10-CM

## 2025-01-16 DIAGNOSIS — M25.551 RIGHT HIP PAIN: Primary | ICD-10-CM

## 2025-01-16 PROCEDURE — 97140 MANUAL THERAPY 1/> REGIONS: CPT

## 2025-01-16 PROCEDURE — 97110 THERAPEUTIC EXERCISES: CPT

## 2025-01-16 PROCEDURE — 97161 PT EVAL LOW COMPLEX 20 MIN: CPT

## 2025-01-16 NOTE — PLAN OF CARE
[facility]- Outpatient Rehabilitation and Therapy  [address], [place] [address] office: [phone] fax: [phone]     Physical Therapy Initial Evaluation Certification      Dear KIMBERLY Alcantara -* ,    We had the pleasure of evaluating the following patient for physical therapy services at [facility] Outpatient Physical Therapy.  A summary of our findings can be found in the initial assessment below.  This includes our plan of care.  If you have any questions or concerns regarding these findings, please do not hesitate to contact me at the office phone number listed above.  Thank you for the referral.     Physician Signature:_______________________________Date:__________________  By signing above (or electronic signature), therapist’s plan is approved by physician       Physical Therapy: TREATMENT/PROGRESS NOTE   Patient: Louise Chauhan (65 y.o. female)   Examination Date: 2025   :  1960 MRN: [id number]   Visit #: 1   Insurance Allowable Auth Needed   30 [x]Yes-after 30    []No    Insurance: Payor: [employer] [place] MEDICAID / [facility] / Product Type: *No Product type* /   Insurance ID: [id number] - (Medicaid Managed)  Secondary Insurance (if applicable):    Treatment Diagnosis:     ICD-10-CM    1. Right hip pain  M25.551       2. Difficulty walking  R26.2       3. Hip stiffness, right  M25.651          Medical Diagnosis:  Right leg pain [M79.604]   Referring Physician: Lisa Cortes APRN *  PCP: Lisa Cortes APRN - CNP     Plan of care signed (Y/N):     Date of Patient follow up with Physician: has not seen ortho     Plan of Care Report: EVAL today  POC update due: (10 visits /OR AUTH LIMITS, whichever is less)  2025                                             Medical History:  Comorbidities:  Cancer/Tumor  Diabetes (Type I or II)  Relevant Medical History: cervical cancer; partial R lung removal

## 2025-01-20 ENCOUNTER — HOSPITAL ENCOUNTER (OUTPATIENT)
Dept: WOMENS IMAGING | Age: 65
Discharge: HOME | End: 2025-01-20
Payer: MEDICAID

## 2025-01-20 VITALS — BODY MASS INDEX: 36.16 KG/M2 | HEIGHT: 64 IN | WEIGHT: 211.8 LBS

## 2025-01-20 DIAGNOSIS — Z12.31 SCREENING MAMMOGRAM FOR BREAST CANCER: ICD-10-CM

## 2025-01-20 DIAGNOSIS — Z78.0 POST-MENOPAUSAL: ICD-10-CM

## 2025-01-20 PROCEDURE — 77080 DXA BONE DENSITY AXIAL: CPT

## 2025-01-20 PROCEDURE — 77063 BREAST TOMOSYNTHESIS BI: CPT

## 2025-01-21 DIAGNOSIS — R92.8 ABNORMAL MAMMOGRAM: Primary | ICD-10-CM

## 2025-01-23 ENCOUNTER — HOSPITAL ENCOUNTER (OUTPATIENT)
Dept: PHYSICAL THERAPY | Age: 65
Setting detail: THERAPIES SERIES
Discharge: HOME | End: 2025-01-23
Payer: MEDICAID

## 2025-01-23 PROCEDURE — 97140 MANUAL THERAPY 1/> REGIONS: CPT

## 2025-01-23 PROCEDURE — 97110 THERAPEUTIC EXERCISES: CPT

## 2025-01-23 NOTE — FLOWSHEET NOTE
[facility]- Outpatient Rehabilitation and Therapy  [address], [place] [address] office: [phone] fax: [phone]       Physical Therapy: TREATMENT/PROGRESS NOTE   Patient: Louise Chauhan (65 y.o. female)   Examination Date: 2025   :  1960 MRN: [id number]   Visit #: 2   Insurance Allowable Auth Needed   30 [x]Yes-after 30    []No    Insurance: Payor: [employer] [place]ICA[place] / Plan: [employer] [place] MEDICA / Product Type: *No Product type* /   Insurance ID: [id number] - (Medicaid Managed)  Secondary Insurance (if applicable):    Treatment Diagnosis:     ICD-10-CM    1. Right hip pain  M25.551       2. Difficulty walking  R26.2       3. Hip stiffness, right  M25.651          Medical Diagnosis:  Right leg pain [M79.604]   Referring Physician: Lisa Cortes APRN *  PCP: Lisa Cortes APRN - CNP     Plan of care signed (Y/N):     Date of Patient follow up with Physician: has not seen ortho     Plan of Care Report: NO  POC update due: (10 visits /OR AUTH LIMITS, whichever is less)  2025                                             Medical History:  Comorbidities:  Cancer/Tumor  Diabetes (Type I or II)  Relevant Medical History: cervical cancer; partial R lung removal                                          Precautions/ Contra-indications:           Latex allergy:  NO  Pacemaker:    NO  Contraindications for Manipulation: NA  Date of Surgery: NA  Other:    Red Flags:  None    Suicide Screening:   The patient did not verbalize a primary behavioral concern, suicidal ideation, suicidal intent, or demonstrate suicidal behaviors.    Preferred Language for Healthcare:   [x] English       [] other:    SUBJECTIVE EXAMINATION     Patient stated complaint: Pt said her hip was feeling better after the HEP over the weekend but then on Monday she had a bone density test done and said they twisted her leg and caused severe pain. She has been in severe

## 2025-01-28 ENCOUNTER — APPOINTMENT (OUTPATIENT)
Dept: PHYSICAL THERAPY | Age: 65
End: 2025-01-28
Payer: MEDICAID

## 2025-01-30 ENCOUNTER — APPOINTMENT (OUTPATIENT)
Dept: PHYSICAL THERAPY | Age: 65
End: 2025-01-30
Payer: MEDICAID

## 2025-02-05 ENCOUNTER — TELEPHONE (OUTPATIENT)
Dept: WOMENS IMAGING | Age: 65
End: 2025-02-05

## 2025-02-05 ENCOUNTER — HOSPITAL ENCOUNTER (OUTPATIENT)
Dept: ULTRASOUND IMAGING | Age: 65
Discharge: HOME | End: 2025-02-05
Payer: MEDICAID

## 2025-02-05 ENCOUNTER — HOSPITAL ENCOUNTER (OUTPATIENT)
Dept: MAMMOGRAPHY | Age: 65
Discharge: HOME | End: 2025-02-05
Payer: MEDICAID

## 2025-02-05 DIAGNOSIS — R92.8 ABNORMAL MAMMOGRAM: Primary | ICD-10-CM

## 2025-02-05 DIAGNOSIS — R92.8 ABNORMAL MAMMOGRAM: ICD-10-CM

## 2025-02-05 PROCEDURE — 76642 ULTRASOUND BREAST LIMITED: CPT

## 2025-02-05 PROCEDURE — G0279 TOMOSYNTHESIS, MAMMO: HCPCS

## 2025-02-05 NOTE — TELEPHONE ENCOUNTER
*TIME SENSITIVE- SCHEDULED TOMORROW*  Lisa Casanova.  Your patient had imaging and was recommended for a breast biopsy.  She is aware, educated, and scheduled for 2/6/25.    Please sign the attached/pended order. Click \"Unsigned Order\" at bottom right. The order will open and show order information. Click \"Sign orders\".    Patient has had abnormal Diagnostic Mammogram and Breast Ultrasound.  Biopsy indicated for Right Breast.  Biopsy order pended below for provider signature.      Thanks,  Estela Conway, INDRA, CN-BM  Patient Navigator  [facility]  [phone]

## 2025-02-06 ENCOUNTER — HOSPITAL ENCOUNTER (OUTPATIENT)
Dept: WOMENS IMAGING | Age: 65
Discharge: HOME | End: 2025-02-06
Payer: MEDICAID

## 2025-02-06 DIAGNOSIS — R92.8 ABNORMAL MAMMOGRAM: ICD-10-CM

## 2025-02-06 PROCEDURE — 88342 IMHCHEM/IMCYTCHM 1ST ANTB: CPT

## 2025-02-06 PROCEDURE — 77065 DX MAMMO INCL CAD UNI: CPT

## 2025-02-06 PROCEDURE — 88305 TISSUE EXAM BY PATHOLOGIST: CPT

## 2025-02-06 PROCEDURE — 2709999900 MAM NEEDLE BIOPSY LYMPH NODE SUPERFICIAL

## 2025-02-06 PROCEDURE — 88360 TUMOR IMMUNOHISTOCHEM/MANUAL: CPT

## 2025-02-06 PROCEDURE — 19083 BX BREAST 1ST LESION US IMAG: CPT

## 2025-02-06 PROCEDURE — 2709999900 US BREAST BIOPSY W LOC DEVICE EACH ADDL LESION RIGHT

## 2025-02-06 NOTE — PROGRESS NOTES
[facility] in [facility] for [facility]. Radiologist reviewed procedure with patient, consent signed. Patient tolerated procedure well. Compression held. Site cleansed with chloraprep, steri strips and dry dressing applied. Ice pack provided. Reviewed discharge instructions with patient. Patient verbalized understanding and agreed to contact the Breast Navigator with any questions. Patient was A&Ox3 and steady on feet and discharged to waiting area.      [facility]   Discharge Instructions  [facility]  [address]  [address]  Telephone: [phone]   FAX [phone]    NAME:  Louise Chauhan  YOB: 1960  GENDER: female  MEDICAL RECORD NUMBER:  [id number]  TODAY'S DATE:  2/6/2025    Discharge Instructions Breast Center:    Post Breast Biopsy Instructions     [x] You may remove your outer dressing in 24 hours and you may shower. The surgical glue will fall off after 7 days. Do not pick, scratch, or rub the film.     [x] Place a cold pack inside your bra on top of the dressing for at least 3 hours, removing it every 15 minutes for 15 minutes after your biopsy.     [x] Wear a firm fitting bra for at least 24 hours after your biopsy, including while you sleep.    [x] Place an ice pack inside your bra on top of the dressing for at least 3 hours, removing it every 15 minutes for 15 minutes after your biopsy.    [x] You may resume your held medications tomorrow, unless otherwise directed by your physician.    [x] Your physician has instructed you to take Tylenol (Acetaminophen) the day of your biopsy for any discomfort.    [x] Watch for excessive bleeding. If bleeding occurs apply pressure to site. Watch for signs of infection, increased pain, redness, swelling and heat.  If this occurs call your physician.     [x] Do not participate in any strenuous exercise for 48 hours after your biopsy and do not lift, pull or push anything over 5-10 lbs.      [x] Results

## 2025-02-10 ENCOUNTER — TELEPHONE (OUTPATIENT)
Dept: WOMENS IMAGING | Age: 65
End: 2025-02-10

## 2025-02-10 ENCOUNTER — TELEPHONE (OUTPATIENT)
Dept: FAMILY MEDICINE CLINIC | Age: 65
End: 2025-02-10

## 2025-02-10 DIAGNOSIS — C50.919 INVASIVE LOBULAR CARCINOMA OF BREAST IN FEMALE (HCC): Primary | ICD-10-CM

## 2025-02-10 NOTE — TELEPHONE ENCOUNTER
Pathology for breast biopsy complete, radiologist confirms concordance.     Reviewed breast biopsy results with patient and answered all questions. The radiologist is recommending that the patient see a breast surgeon regarding biopsy results.  Per patient request, referral made to [facility].  Patient's pcp has already contacted her with breast biopsy results. Breast Navigator will remain available for any questions. Pt verbalized understanding.      Michelle Bergeron RN

## 2025-02-10 NOTE — TELEPHONE ENCOUNTER
Called to speak with patient about biopsy results. Patient was at work but message with left with her . Referrals were placed for breast surgeon and oncologist. He verbalized his understanding and was given the referral information.

## 2025-02-14 ENCOUNTER — TRANSCRIBE ORDERS (OUTPATIENT)
Dept: ADMINISTRATIVE | Age: 65
End: 2025-02-14

## 2025-02-14 DIAGNOSIS — C50.811 MALIGNANT NEOPLASM OF OVERLAPPING SITES OF RIGHT BREAST IN FEMALE, ESTROGEN RECEPTOR POSITIVE (HCC): Primary | ICD-10-CM

## 2025-02-14 DIAGNOSIS — Z17.0 MALIGNANT NEOPLASM OF OVERLAPPING SITES OF RIGHT BREAST IN FEMALE, ESTROGEN RECEPTOR POSITIVE (HCC): Primary | ICD-10-CM

## 2025-02-19 ENCOUNTER — HOSPITAL ENCOUNTER (OUTPATIENT)
Dept: MRI IMAGING | Age: 65
Discharge: HOME | End: 2025-02-19
Attending: SURGERY
Payer: MEDICARE

## 2025-02-19 DIAGNOSIS — C50.811 MALIGNANT NEOPLASM OF OVERLAPPING SITES OF RIGHT BREAST IN FEMALE, ESTROGEN RECEPTOR POSITIVE (HCC): ICD-10-CM

## 2025-02-19 DIAGNOSIS — Z17.0 MALIGNANT NEOPLASM OF OVERLAPPING SITES OF RIGHT BREAST IN FEMALE, ESTROGEN RECEPTOR POSITIVE (HCC): ICD-10-CM

## 2025-02-19 PROCEDURE — A9579 GAD-BASE MR CONTRAST NOS,1ML: HCPCS | Performed by: SURGERY

## 2025-02-19 PROCEDURE — 6360000004 HC RX CONTRAST MEDICATION: Performed by: SURGERY

## 2025-02-19 PROCEDURE — C8908 MRI W/O FOL W/CONT, BREAST,: HCPCS

## 2025-02-19 RX ADMIN — GADOTERIDOL 19 ML: 279.3 INJECTION, SOLUTION INTRAVENOUS at 08:29

## 2025-02-25 NOTE — PROGRESS NOTES
[facility]    CC: Breast CA     Referring physician: Radha Degroot MD    HPI: This is a 65 y.o. female with a PMHx as delineated below who presents in consultation for breast reconstruction. She was found to have right breast cancer with a suspicious left mass and desires to proceed with unilateral (possible bilateral) mastectomy with reconstruction.  Plastic surgery was consulted for evaluation and treatment.The specifics of her breast cancer workup / treatment is as follows:     Diagnosis: invasive lobular  Mo/Yr Diagnosed:   Breast Involved:Right  Tumor Size & Grade: T2N0M0; 2  Stage: 1B  [place] status: Negative  ER: Positive OK: Positive HER2: Negative    Oncologist: None  Radiation: None    Chemo/Meds: None  Pregnancy/Miscarriages:     PMHx:   Past Medical History:   Diagnosis Date    2019 novel coronavirus-infected pneumonia (NCIP) 2021    Cancer (HCC)     cervical    Lung nodule     RLL    Pneumonia 2021    uses O2 2L/NC at night    Restless legs syndrome     Wears partial dentures    HgbA1c - 6.2 ()    PSHx:   Past Surgical History:   Procedure Laterality Date     SECTION      x2 - \"emergency\"    COLONOSCOPY      LEEP      x2    LUNG SURGERY Right 2022    [facility] NEEDLE BIOPSY LYMPH NODE SUPERFICIAL N/A 2025    [facility] NEEDLE BIOPSY LYMPH NODE SUPERFICIAL 2025 Neto Celis MD [facility]    THORACOSCOPY Right 2022    RIGHT VIDEO ASSISTED THORACOSCOPIC SURGERY WITH WEDGE RESECTION  OF RIGHT LOWER LOBE WITH INTERCOSTAL NERVE BLOCK AND BRONCHOSCOPY performed by Brandon Ansari MD at [facility]    US BREAST BIOPSY W LOC DEVICE 1ST LESION RIGHT Right 2025    US BREAST BIOPSY W LOC DEVICE 1ST LESION RIGHT 2025 Neto Celis MD [facility]    US BREAST BIOPSY W LOC DEVICE EACH ADDL LESION RIGHT Right 2025    US BREAST BIOPSY W LOC DEVICE EACH ADDL LESION RIGHT 2025 Neto Celis MD [facility]

## 2025-02-26 ENCOUNTER — OFFICE VISIT (OUTPATIENT)
Dept: SURGERY | Age: 65
End: 2025-02-26

## 2025-02-26 VITALS
OXYGEN SATURATION: 98 % | BODY MASS INDEX: 35.49 KG/M2 | WEIGHT: 213 LBS | DIASTOLIC BLOOD PRESSURE: 84 MMHG | SYSTOLIC BLOOD PRESSURE: 149 MMHG | HEART RATE: 71 BPM | TEMPERATURE: 97.5 F | HEIGHT: 65 IN

## 2025-02-26 DIAGNOSIS — C50.911 MALIGNANT NEOPLASM OF RIGHT FEMALE BREAST, UNSPECIFIED ESTROGEN RECEPTOR STATUS, UNSPECIFIED SITE OF BREAST (HCC): Primary | ICD-10-CM

## 2025-02-26 RX ORDER — SODIUM CHLORIDE 9 MG/ML
INJECTION, SOLUTION INTRAVENOUS PRN
OUTPATIENT
Start: 2025-02-26

## 2025-02-26 RX ORDER — SODIUM CHLORIDE 0.9 % (FLUSH) 0.9 %
5-40 SYRINGE (ML) INJECTION PRN
OUTPATIENT
Start: 2025-02-26

## 2025-02-26 RX ORDER — SODIUM CHLORIDE 0.9 % (FLUSH) 0.9 %
5-40 SYRINGE (ML) INJECTION EVERY 12 HOURS SCHEDULED
OUTPATIENT
Start: 2025-02-26

## 2025-02-26 RX ORDER — SODIUM CHLORIDE, SODIUM LACTATE, POTASSIUM CHLORIDE, CALCIUM CHLORIDE 600; 310; 30; 20 MG/100ML; MG/100ML; MG/100ML; MG/100ML
INJECTION, SOLUTION INTRAVENOUS CONTINUOUS
OUTPATIENT
Start: 2025-02-26

## 2025-02-27 ENCOUNTER — TELEPHONE (OUTPATIENT)
Dept: SURGERY | Age: 65
End: 2025-02-27

## 2025-02-27 NOTE — TELEPHONE ENCOUNTER
The patient was in the office to see Dr. Rodriguez yesterday.    PLAN: We discussed options with Shanthi. She did not want any implant based reconstruction and she has comorbid conditions that would not be ideal for immediate autologous reconstruction. Thus will perform bilateral [place] reconstruction.  Will likely require reverse (using the mastectomy flap on the right breast.  Will work with Dr. Degroot and schedule.     I received a surgery letter.     The patient will have [employer] Medicare PPO effective 3-1-2025. I will submit the [employer] on that date and reach out to the patient at that time.     April?    I will leave this phone note open.

## 2025-03-03 NOTE — TELEPHONE ENCOUNTER
The patient returned the call mentioned below to discuss insurance and surgery scheduling.     Please call: [phone]

## 2025-03-03 NOTE — TELEPHONE ENCOUNTER
I spoke with the automated provider line at [employer] ([phone]) to see if CPT Code 04270 requires pre certification. Pre certification is not required for procedure code 67624.    Call Reference # [id number]    I lmom for the patient at the home number listed. I requested a call back to discuss insurance and surgery scheduling.     I will leave this phone note open.

## 2025-03-04 ENCOUNTER — TELEPHONE (OUTPATIENT)
Dept: FAMILY MEDICINE CLINIC | Age: 65
End: 2025-03-04

## 2025-03-04 NOTE — TELEPHONE ENCOUNTER
I spoke with the patient at the home number listed. The patient is now scheduled for surgery with  on 3-.     The patient is aware of H&P.    The patient is scheduled for her post op appointment 4-4-2025.    I will submit the case request to [facility] once Dr. Degroot submits hers.     I will email the surgery information and instructions to the patient today at [email or web address].     I will close this phone note.

## 2025-03-04 NOTE — TELEPHONE ENCOUNTER
I lmom for the patient at the home number listed. I requested a call back to discuss insurance and surgery scheduling.      I will leave this phone note open.

## 2025-03-04 NOTE — TELEPHONE ENCOUNTER
The patient returned the call mentioned below to discuss insurance and surgery scheduling.     Please call: [phone]

## 2025-03-10 ENCOUNTER — OFFICE VISIT (OUTPATIENT)
Dept: FAMILY MEDICINE CLINIC | Age: 65
End: 2025-03-10
Payer: MEDICARE

## 2025-03-10 VITALS
DIASTOLIC BLOOD PRESSURE: 86 MMHG | HEIGHT: 65 IN | WEIGHT: 214.2 LBS | SYSTOLIC BLOOD PRESSURE: 134 MMHG | OXYGEN SATURATION: 97 % | BODY MASS INDEX: 35.69 KG/M2 | HEART RATE: 76 BPM

## 2025-03-10 DIAGNOSIS — E11.9 TYPE 2 DIABETES MELLITUS WITHOUT COMPLICATION, WITHOUT LONG-TERM CURRENT USE OF INSULIN: ICD-10-CM

## 2025-03-10 DIAGNOSIS — E11.9 TYPE 2 DIABETES MELLITUS WITHOUT COMPLICATION, WITHOUT LONG-TERM CURRENT USE OF INSULIN (HCC): ICD-10-CM

## 2025-03-10 DIAGNOSIS — C50.912 BILATERAL MALIGNANT NEOPLASM OF BREAST IN FEMALE, UNSPECIFIED ESTROGEN RECEPTOR STATUS, UNSPECIFIED SITE OF BREAST (HCC): Primary | ICD-10-CM

## 2025-03-10 DIAGNOSIS — Z01.818 PRE-OP EXAMINATION: ICD-10-CM

## 2025-03-10 DIAGNOSIS — C50.911 BILATERAL MALIGNANT NEOPLASM OF BREAST IN FEMALE, UNSPECIFIED ESTROGEN RECEPTOR STATUS, UNSPECIFIED SITE OF BREAST (HCC): Primary | ICD-10-CM

## 2025-03-10 DIAGNOSIS — C50.912 BILATERAL MALIGNANT NEOPLASM OF BREAST IN FEMALE, UNSPECIFIED ESTROGEN RECEPTOR STATUS, UNSPECIFIED SITE OF BREAST: ICD-10-CM

## 2025-03-10 DIAGNOSIS — C50.911 BILATERAL MALIGNANT NEOPLASM OF BREAST IN FEMALE, UNSPECIFIED ESTROGEN RECEPTOR STATUS, UNSPECIFIED SITE OF BREAST: ICD-10-CM

## 2025-03-10 LAB
ALBUMIN SERPL-MCNC: 4.1 G/DL (ref 3.4–5)
ALBUMIN/GLOB SERPL: 1.3 {RATIO} (ref 1.1–2.2)
ALP SERPL-CCNC: 138 U/L (ref 40–129)
ALT SERPL-CCNC: 20 U/L (ref 10–40)
ANION GAP SERPL CALCULATED.3IONS-SCNC: 11 MMOL/L (ref 3–16)
APTT BLD: 28 SEC (ref 22.1–36.4)
AST SERPL-CCNC: 25 U/L (ref 15–37)
BASOPHILS # BLD: 0 K/UL (ref 0–0.2)
BASOPHILS NFR BLD: 0.4 %
BILIRUB SERPL-MCNC: <0.2 MG/DL (ref 0–1)
BUN SERPL-MCNC: 13 MG/DL (ref 7–20)
CALCIUM SERPL-MCNC: 9.7 MG/DL (ref 8.3–10.6)
CHLORIDE SERPL-SCNC: 105 MMOL/L (ref 99–110)
CO2 SERPL-SCNC: 25 MMOL/L (ref 21–32)
CREAT SERPL-MCNC: 0.7 MG/DL (ref 0.6–1.2)
DEPRECATED RDW RBC AUTO: 14.2 % (ref 12.4–15.4)
EOSINOPHIL # BLD: 0.1 K/UL (ref 0–0.6)
EOSINOPHIL NFR BLD: 1.8 %
GFR SERPLBLD CREATININE-BSD FMLA CKD-EPI: >90 ML/MIN/{1.73_M2}
GLUCOSE SERPL-MCNC: 114 MG/DL (ref 70–99)
HCT VFR BLD AUTO: 42.7 % (ref 36–48)
HGB BLD-MCNC: 13.9 G/DL (ref 12–16)
INR PPP: 0.96 (ref 0.85–1.15)
LYMPHOCYTES # BLD: 2.4 K/UL (ref 1–5.1)
LYMPHOCYTES NFR BLD: 39.8 %
MCH RBC QN AUTO: 28.4 PG (ref 26–34)
MCHC RBC AUTO-ENTMCNC: 32.6 G/DL (ref 31–36)
MCV RBC AUTO: 87.1 FL (ref 80–100)
MONOCYTES # BLD: 0.4 K/UL (ref 0–1.3)
MONOCYTES NFR BLD: 6 %
NEUTROPHILS # BLD: 3.1 K/UL (ref 1.7–7.7)
NEUTROPHILS NFR BLD: 52 %
PLATELET # BLD AUTO: 199 K/UL (ref 135–450)
PMV BLD AUTO: 8.4 FL (ref 5–10.5)
POTASSIUM SERPL-SCNC: 4.2 MMOL/L (ref 3.5–5.1)
PROT SERPL-MCNC: 7.2 G/DL (ref 6.4–8.2)
PROTHROMBIN TIME: 13 SEC (ref 11.9–14.9)
RBC # BLD AUTO: 4.9 M/UL (ref 4–5.2)
SODIUM SERPL-SCNC: 141 MMOL/L (ref 136–145)
WBC # BLD AUTO: 6 K/UL (ref 4–11)

## 2025-03-10 PROCEDURE — 2022F DILAT RTA XM EVC RTNOPTHY: CPT | Performed by: NURSE PRACTITIONER

## 2025-03-10 PROCEDURE — 1090F PRES/ABSN URINE INCON ASSESS: CPT | Performed by: NURSE PRACTITIONER

## 2025-03-10 PROCEDURE — G8417 CALC BMI ABV UP PARAM F/U: HCPCS | Performed by: NURSE PRACTITIONER

## 2025-03-10 PROCEDURE — 1123F ACP DISCUSS/DSCN MKR DOCD: CPT | Performed by: NURSE PRACTITIONER

## 2025-03-10 PROCEDURE — G8399 PT W/DXA RESULTS DOCUMENT: HCPCS | Performed by: NURSE PRACTITIONER

## 2025-03-10 PROCEDURE — 93000 ELECTROCARDIOGRAM COMPLETE: CPT | Performed by: NURSE PRACTITIONER

## 2025-03-10 PROCEDURE — G8427 DOCREV CUR MEDS BY ELIG CLIN: HCPCS | Performed by: NURSE PRACTITIONER

## 2025-03-10 PROCEDURE — 3017F COLORECTAL CA SCREEN DOC REV: CPT | Performed by: NURSE PRACTITIONER

## 2025-03-10 PROCEDURE — 99214 OFFICE O/P EST MOD 30 MIN: CPT | Performed by: NURSE PRACTITIONER

## 2025-03-10 PROCEDURE — 1036F TOBACCO NON-USER: CPT | Performed by: NURSE PRACTITIONER

## 2025-03-10 PROCEDURE — 3044F HG A1C LEVEL LT 7.0%: CPT | Performed by: NURSE PRACTITIONER

## 2025-03-10 NOTE — PROGRESS NOTES
(MEDROL, ANKIT,) 4 MG tablet Take 6 tablets all at once on day 1, 5 tablets on day 2, 4 tablets on day 3, 3 tablets on day 4 and 2 tablets on day 5 and 1 tablet on day 6. 21 tablet 0     No current facility-administered medications for this visit.         Allergies:  Other  History of allergic reaction to anesthesia:  No     Social History     Tobacco Use   Smoking Status Never   Smokeless Tobacco Never     The patient states she drinks 5 per week.    Family History   Problem Relation Age of Onset    Breast Cancer Mother     Allergy (Severe) Mother     Heart Attack Father     Diabetes Father        REVIEW OF SYSTEMS:    CONSTITUTIONAL:  negative  EYES:  negative  HEENT:  negative  RESPIRATORY:  negative  CARDIOVASCULAR:  negative  GASTROINTESTINAL:  negative  GENITOURINARY:  negative  INTEGUMENT/BREAST:  negative  HEMATOLOGIC/LYMPHATIC:  negative  ALLERGIC/IMMUNOLOGIC:  negative  ENDOCRINE:  negative  MUSCULOSKELETAL:  negative  NEUROLOGICAL:  negative    PHYSICAL EXAM:      /86   Pulse 97   Ht 1.651 m (5' 5\")   Wt 97.2 kg (214 lb 3.2 oz)   LMP 02/27/2012   SpO2 (!) 76%   BMI 35.64 kg/m²     CONSTITUTIONAL:  awake, alert, cooperative, no apparent distress, and appears stated age    Eyes:  Lids and lashes normal, pupils equal, round and reactive to light, extra ocular muscles intact, sclera clear, conjunctiva normal    Head/ENT:  Normocephalic, without obvious abnormality, atramatic, sinuses nontender on palpation, external ears without lesions, oral pharynx with moist mucus membranes, tonsils without erythema or exudates, gums normal and good dentition.    Neck:  Supple, symmetrical, trachea midline, no adenopathy, thyroid symmetric, not enlarged and no tenderness, skin normal    Heart:  Normal apical impulse, regular rate and rhythm, normal S1 and S2, no S3 or S4, and no murmur noted    Lungs:  No increased work of breathing, good air exchange, clear to auscultation bilaterally, no crackles or

## 2025-03-11 ENCOUNTER — RESULTS FOLLOW-UP (OUTPATIENT)
Dept: FAMILY MEDICINE CLINIC | Age: 65
End: 2025-03-11

## (undated) DEVICE — DRAIN SURG 24FR L5/16IN DIA8MM SIL RND HUBLESS FULL FLUT

## (undated) DEVICE — SUTURE VCRL SZ 0 L36IN ABSRB UD CT-1 L36MM 1/2 CIR TAPR PNT VCP946H

## (undated) DEVICE — Device

## (undated) DEVICE — SUTURE MCRYL + SZ 4-0 L18IN ABSRB UD L19MM PS-2 3/8 CIR MCP496G

## (undated) DEVICE — TISSUE RETRIEVAL SYSTEM: Brand: INZII RETRIEVAL SYSTEM

## (undated) DEVICE — SUTURE VCRL SZ 3-0 L27IN ABSRB UD L36MM CT-1 1/2 CIR J258H

## (undated) DEVICE — CHLORAPREP 26ML ORANGE

## (undated) DEVICE — [HIGH FLOW INSUFFLATOR,  DO NOT USE IF PACKAGE IS DAMAGED,  KEEP DRY,  KEEP AWAY FROM SUNLIGHT,  PROTECT FROM HEAT AND RADIOACTIVE SOURCES.]: Brand: PNEUMOSURE

## (undated) DEVICE — CONNECTOR PERF W0.25XH3/8IN BASE Y SHP REDUC W/O LUERLOCK

## (undated) DEVICE — RELOAD STPL 3.5MM L60MM 0DEG UNIV TISS PUR TI 6 ROW LIN

## (undated) DEVICE — DRAIN,WOUND,ROUND,24FR,5/16",FULL-FLUTED: Brand: MEDLINE

## (undated) DEVICE — SOLUTION IV IRRIG POUR BRL 0.9% SODIUM CHL 2F7124

## (undated) DEVICE — ELECTRODE LAP L36CM PTFE WIRE J HK CLEANCOAT

## (undated) DEVICE — TROCAR: Brand: KII FIOS FIRST ENTRY

## (undated) DEVICE — TROCAR: Brand: KII SLEEVE

## (undated) DEVICE — SUTURE PERMA-HAND SZ 0 L18IN NONABSORBABLE BLK L30MM FSL 678G

## (undated) DEVICE — STAPLER INT L16CM STD UNIV RELD DISP TRI-STAPLE ENDO GIA

## (undated) DEVICE — STERILE LATEX POWDER-FREE SURGICAL GLOVESWITH NITRILE COATING: Brand: PROTEXIS

## (undated) DEVICE — ANTI-FOG SOLUTION WITH FOAM PAD: Brand: DEVON

## (undated) DEVICE — SUTURE NONABSORBABLE MONOFILAMENT 4-0 RB-1 36 IN BLU PROLENE 8557H

## (undated) DEVICE — SYRINGE MED 50ML LUERLOCK TIP